# Patient Record
Sex: MALE | Race: BLACK OR AFRICAN AMERICAN | NOT HISPANIC OR LATINO | Employment: FULL TIME | URBAN - METROPOLITAN AREA
[De-identification: names, ages, dates, MRNs, and addresses within clinical notes are randomized per-mention and may not be internally consistent; named-entity substitution may affect disease eponyms.]

---

## 2017-03-08 ENCOUNTER — OFFICE VISIT (OUTPATIENT)
Dept: FAMILY MEDICINE CLINIC | Facility: CLINIC | Age: 47
End: 2017-03-08

## 2017-03-08 VITALS
SYSTOLIC BLOOD PRESSURE: 134 MMHG | HEIGHT: 74 IN | BODY MASS INDEX: 30.03 KG/M2 | RESPIRATION RATE: 16 BRPM | OXYGEN SATURATION: 98 % | HEART RATE: 90 BPM | WEIGHT: 234 LBS | TEMPERATURE: 97.6 F | DIASTOLIC BLOOD PRESSURE: 86 MMHG

## 2017-03-08 DIAGNOSIS — Z00.00 HEALTHCARE MAINTENANCE: ICD-10-CM

## 2017-03-08 DIAGNOSIS — F17.200 NICOTINE DEPENDENCE, UNCOMPLICATED, UNSPECIFIED NICOTINE PRODUCT TYPE: ICD-10-CM

## 2017-03-08 DIAGNOSIS — L30.9 ECZEMA, UNSPECIFIED TYPE: Primary | ICD-10-CM

## 2017-03-08 DIAGNOSIS — L65.9 HAIR LOSS: ICD-10-CM

## 2017-03-08 DIAGNOSIS — R20.0 NUMBNESS IN FEET: ICD-10-CM

## 2017-03-08 LAB
ALBUMIN SERPL-MCNC: 4.4 G/DL (ref 3.2–4.8)
ALBUMIN/GLOB SERPL: 1.3 G/DL (ref 1.5–2.5)
ALP SERPL-CCNC: 97 U/L (ref 25–100)
ALT SERPL W P-5'-P-CCNC: 46 U/L (ref 7–40)
ANION GAP SERPL CALCULATED.3IONS-SCNC: 5 MMOL/L (ref 3–11)
ARTICHOKE IGE QN: 236 MG/DL (ref 0–130)
AST SERPL-CCNC: 29 U/L (ref 0–33)
BASOPHILS # BLD AUTO: 0.01 10*3/MM3 (ref 0–0.2)
BASOPHILS NFR BLD AUTO: 0.1 % (ref 0–1)
BILIRUB BLD-MCNC: NEGATIVE MG/DL
BILIRUB SERPL-MCNC: 0.4 MG/DL (ref 0.3–1.2)
BUN BLD-MCNC: 17 MG/DL (ref 9–23)
BUN/CREAT SERPL: 15.5 (ref 7–25)
CALCIUM SPEC-SCNC: 10.3 MG/DL (ref 8.7–10.4)
CHLORIDE SERPL-SCNC: 109 MMOL/L (ref 99–109)
CHOLEST SERPL-MCNC: 275 MG/DL (ref 0–200)
CLARITY, POC: CLEAR
CO2 SERPL-SCNC: 29 MMOL/L (ref 20–31)
COLOR UR: YELLOW
CREAT BLD-MCNC: 1.1 MG/DL (ref 0.6–1.3)
DEPRECATED RDW RBC AUTO: 47.4 FL (ref 37–54)
EOSINOPHIL # BLD AUTO: 0.07 10*3/MM3 (ref 0.1–0.3)
EOSINOPHIL NFR BLD AUTO: 0.8 % (ref 0–3)
ERYTHROCYTE [DISTWIDTH] IN BLOOD BY AUTOMATED COUNT: 13.2 % (ref 11.3–14.5)
GFR SERPL CREATININE-BSD FRML MDRD: 88 ML/MIN/1.73
GLOBULIN UR ELPH-MCNC: 3.3 GM/DL
GLUCOSE BLD-MCNC: 92 MG/DL (ref 70–100)
GLUCOSE UR STRIP-MCNC: NEGATIVE MG/DL
HBA1C MFR BLD: 5.8 %
HCT VFR BLD AUTO: 51.3 % (ref 38.9–50.9)
HCV AB SER DONR QL: NORMAL
HDLC SERPL-MCNC: 35 MG/DL (ref 40–60)
HGB BLD-MCNC: 17 G/DL (ref 13.1–17.5)
HIV1+2 AB SER QL: NORMAL
IMM GRANULOCYTES # BLD: 0.06 10*3/MM3 (ref 0–0.03)
IMM GRANULOCYTES NFR BLD: 0.7 % (ref 0–0.6)
KETONES UR QL: NEGATIVE
LEUKOCYTE EST, POC: NEGATIVE
LYMPHOCYTES # BLD AUTO: 2.39 10*3/MM3 (ref 0.6–4.8)
LYMPHOCYTES NFR BLD AUTO: 28.2 % (ref 24–44)
MCH RBC QN AUTO: 32.3 PG (ref 27–31)
MCHC RBC AUTO-ENTMCNC: 33.1 G/DL (ref 32–36)
MCV RBC AUTO: 97.5 FL (ref 80–99)
MONOCYTES # BLD AUTO: 0.66 10*3/MM3 (ref 0–1)
MONOCYTES NFR BLD AUTO: 7.8 % (ref 0–12)
NEUTROPHILS # BLD AUTO: 5.28 10*3/MM3 (ref 1.5–8.3)
NEUTROPHILS NFR BLD AUTO: 62.4 % (ref 41–71)
NITRITE UR-MCNC: NEGATIVE MG/ML
PH UR: 5 [PH] (ref 5–8)
PLATELET # BLD AUTO: 210 10*3/MM3 (ref 150–450)
PMV BLD AUTO: 12.8 FL (ref 6–12)
POTASSIUM BLD-SCNC: 4.4 MMOL/L (ref 3.5–5.5)
PROT SERPL-MCNC: 7.7 G/DL (ref 5.7–8.2)
PROT UR STRIP-MCNC: NEGATIVE MG/DL
RBC # BLD AUTO: 5.26 10*6/MM3 (ref 4.2–5.76)
RBC # UR STRIP: NEGATIVE /UL
SODIUM BLD-SCNC: 143 MMOL/L (ref 132–146)
SP GR UR: 1.02 (ref 1–1.03)
TRIGL SERPL-MCNC: 143 MG/DL (ref 0–150)
TSH SERPL DL<=0.05 MIU/L-ACNC: 0.31 MIU/ML (ref 0.35–5.35)
UROBILINOGEN UR QL: NORMAL
WBC NRBC COR # BLD: 8.47 10*3/MM3 (ref 3.5–10.8)

## 2017-03-08 PROCEDURE — 81003 URINALYSIS AUTO W/O SCOPE: CPT | Performed by: FAMILY MEDICINE

## 2017-03-08 PROCEDURE — 84443 ASSAY THYROID STIM HORMONE: CPT | Performed by: FAMILY MEDICINE

## 2017-03-08 PROCEDURE — 86696 HERPES SIMPLEX TYPE 2 TEST: CPT | Performed by: FAMILY MEDICINE

## 2017-03-08 PROCEDURE — 85025 COMPLETE CBC W/AUTO DIFF WBC: CPT | Performed by: FAMILY MEDICINE

## 2017-03-08 PROCEDURE — 86803 HEPATITIS C AB TEST: CPT | Performed by: FAMILY MEDICINE

## 2017-03-08 PROCEDURE — 36415 COLL VENOUS BLD VENIPUNCTURE: CPT | Performed by: FAMILY MEDICINE

## 2017-03-08 PROCEDURE — G0432 EIA HIV-1/HIV-2 SCREEN: HCPCS | Performed by: FAMILY MEDICINE

## 2017-03-08 PROCEDURE — 86695 HERPES SIMPLEX TYPE 1 TEST: CPT | Performed by: FAMILY MEDICINE

## 2017-03-08 PROCEDURE — 80061 LIPID PANEL: CPT | Performed by: FAMILY MEDICINE

## 2017-03-08 PROCEDURE — 80053 COMPREHEN METABOLIC PANEL: CPT | Performed by: FAMILY MEDICINE

## 2017-03-08 PROCEDURE — 99204 OFFICE O/P NEW MOD 45 MIN: CPT | Performed by: FAMILY MEDICINE

## 2017-03-08 PROCEDURE — 99406 BEHAV CHNG SMOKING 3-10 MIN: CPT | Performed by: FAMILY MEDICINE

## 2017-03-08 PROCEDURE — 83036 HEMOGLOBIN GLYCOSYLATED A1C: CPT | Performed by: FAMILY MEDICINE

## 2017-03-08 NOTE — PROGRESS NOTES
Subjective   Tank Tejeda is a 46 y.o. male.     History of Present Illness   Establish care/ has not had a PCP    Has had numbness in right and left 5th toes. Denies back injury.  Has rash on lower extremities, itchy. Has used a steroid cream little relief.  Has not seen a dermatologist.Concerned about diabetes. + tob 1ppd  Concerned about herpes. ? previous outbreak several years ago.  Concerned about hair loss and concerned about thyroid.    The following portions of the patient's history were reviewed and updated as appropriate: allergies, current medications, past family history, past medical history, past social history, past surgical history and problem list.    Review of Systems   Constitutional: Negative for appetite change, chills, diaphoresis, fatigue, fever and unexpected weight change.   HENT: Negative for congestion, ear pain, mouth sores, postnasal drip, rhinorrhea, sinus pressure, sneezing, sore throat and trouble swallowing.    Eyes: Negative for pain, redness and visual disturbance.   Respiratory: Negative for apnea, cough, chest tightness, shortness of breath and wheezing.    Cardiovascular: Negative for chest pain, palpitations and leg swelling.   Gastrointestinal: Negative for abdominal distention, blood in stool, constipation, diarrhea and nausea.   Endocrine: Negative for cold intolerance, polydipsia, polyphagia and polyuria.   Genitourinary: Negative for difficulty urinating, dysuria, enuresis, flank pain and urgency.   Musculoskeletal: Negative for arthralgias, back pain, joint swelling, myalgias and neck pain.   Skin: Negative for color change, rash and wound.   Neurological: Negative for dizziness, syncope, weakness, light-headedness and numbness.   Hematological: Negative for adenopathy.   Psychiatric/Behavioral: Negative for agitation, behavioral problems and confusion. The patient is not nervous/anxious.        Objective   Physical Exam   Constitutional: He is oriented to person, place,  and time. He appears well-developed and well-nourished. No distress.   HENT:   Right Ear: External ear normal.   Left Ear: External ear normal.   Nose: Nose normal.   Mouth/Throat: Oropharynx is clear and moist.   Eyes: Conjunctivae and EOM are normal. Pupils are equal, round, and reactive to light.   Neck: Normal range of motion. Neck supple. No thyromegaly present.   Cardiovascular: Normal rate, regular rhythm and normal heart sounds.    No murmur heard.  Pulmonary/Chest: Effort normal and breath sounds normal. No respiratory distress. He has no wheezes.   Abdominal: Soft. Bowel sounds are normal. He exhibits no distension and no mass. There is no tenderness. There is no rebound and no guarding. No hernia.   Musculoskeletal: Normal range of motion. He exhibits no edema or tenderness.   Lymphadenopathy:     He has no cervical adenopathy.   Neurological: He is alert and oriented to person, place, and time. He has normal reflexes.   Skin: Skin is warm and dry. No rash noted. He is not diaphoretic. No erythema. No pallor.   Psychiatric: He has a normal mood and affect. His behavior is normal. Judgment and thought content normal.   Nursing note and vitals reviewed.      Assessment/Plan   Tank was seen today for establish care.    Diagnoses and all orders for this visit:    Eczema, unspecified type  -     Ambulatory Referral to Dermatology  -     Hepatitis C Antibody  -     HIV-1 / O / 2 Ag / Antibody 4th Generation    Numbness in feet  -     POC Glycosylated Hemoglobin (Hb A1C)  -     CBC & Differential  -     Comprehensive Metabolic Panel  -     Lipid Panel  -     TSH  -     HSV 1 & 2 - Specific Antibody, IgG  -     CBC Auto Differential    Hair loss    Nicotine dependence, uncomplicated, unspecified nicotine product type    Healthcare maintenance  -     POC Urinalysis Dipstick, Automated    Other orders  -     fluocinonide-emollient (LIDEX-E) 0.05 % cream; Apply  topically 2 (Two) Times a Day.      Encouraged  smoking cessation. Patient counseled on the risks, complications and implications of long term use including heart disease, stroke and increased risk of many cancers. Offered behavioral therapy, social support, and medication therapies. Patient will consider and discuss at next visit.3-10 min.  I personally spent over half of a total 45 minutes face to face with the patient in counseling and discussion and/or coordination of care as described above.

## 2017-03-09 LAB
HSV1 IGG SER IA-ACNC: <0.91 INDEX (ref 0–0.9)
HSV2 IGG SER IA-ACNC: >23.6 INDEX (ref 0–0.9)

## 2017-09-26 DIAGNOSIS — M25.562 PAIN IN BOTH KNEES, UNSPECIFIED CHRONICITY: Primary | ICD-10-CM

## 2017-09-26 DIAGNOSIS — M25.561 PAIN IN BOTH KNEES, UNSPECIFIED CHRONICITY: Primary | ICD-10-CM

## 2017-11-13 ENCOUNTER — OFFICE VISIT (OUTPATIENT)
Dept: FAMILY MEDICINE CLINIC | Facility: CLINIC | Age: 47
End: 2017-11-13

## 2017-11-13 VITALS
HEART RATE: 88 BPM | WEIGHT: 227 LBS | SYSTOLIC BLOOD PRESSURE: 128 MMHG | HEIGHT: 74 IN | DIASTOLIC BLOOD PRESSURE: 86 MMHG | BODY MASS INDEX: 29.13 KG/M2 | RESPIRATION RATE: 16 BRPM | OXYGEN SATURATION: 98 %

## 2017-11-13 DIAGNOSIS — F41.9 ANXIETY: Primary | ICD-10-CM

## 2017-11-13 PROCEDURE — 99214 OFFICE O/P EST MOD 30 MIN: CPT | Performed by: FAMILY MEDICINE

## 2017-11-13 RX ORDER — ESCITALOPRAM OXALATE 5 MG/1
TABLET ORAL
Qty: 30 TABLET | Refills: 0 | Status: SHIPPED | OUTPATIENT
Start: 2017-11-13 | End: 2017-11-30 | Stop reason: SDUPTHER

## 2017-11-13 RX ORDER — CLORAZEPATE DIPOTASSIUM 3.75 MG/1
3.75 TABLET ORAL DAILY PRN
Qty: 20 TABLET | Refills: 0 | Status: SHIPPED | OUTPATIENT
Start: 2017-11-13 | End: 2018-02-15 | Stop reason: SDUPTHER

## 2017-11-13 NOTE — PROGRESS NOTES
Subjective   Tank Tejeda is a 47 y.o. male.     Anxiety   Presents for follow-up (had to be driven home from work on the weekend due to anxiety attack; Had previously tried lexapro, celexa, paxil years ago with possilbe relief.) visit. Symptoms include nervous/anxious behavior, palpitations and panic. Patient reports no chest pain, depressed mood, insomnia, irritability, restlessness, shortness of breath or suicidal ideas. Symptoms occur rarely. The severity of symptoms is moderate. The patient sleeps 5 hours per night. The quality of sleep is good. Nighttime awakenings: occasional.     Compliance with medications is %.   Has had recent stressors, suspended license. Stressful job and now that he has license back his job.    The following portions of the patient's history were reviewed and updated as appropriate: allergies, current medications, past family history, past medical history, past social history, past surgical history and problem list.    Review of Systems   Constitutional: Negative.  Negative for irritability.   HENT: Negative.    Eyes: Negative.    Respiratory: Negative.  Negative for shortness of breath.    Cardiovascular: Positive for palpitations. Negative for chest pain.   Gastrointestinal: Negative.    Endocrine: Negative.    Genitourinary: Negative.    Musculoskeletal: Negative.    Skin: Negative.    Allergic/Immunologic: Negative.    Neurological: Negative.    Hematological: Negative.    Psychiatric/Behavioral: Negative for suicidal ideas. The patient is nervous/anxious. The patient does not have insomnia.    All other systems reviewed and are negative.      Objective   Physical Exam   Constitutional: He is oriented to person, place, and time. He appears well-developed and well-nourished. No distress.   HENT:   Right Ear: External ear normal.   Left Ear: External ear normal.   Nose: Nose normal.   Mouth/Throat: Oropharynx is clear and moist.   Eyes: Conjunctivae and EOM are normal. Pupils  are equal, round, and reactive to light.   Neck: Normal range of motion. Neck supple. No thyromegaly present.   Cardiovascular: Normal rate, regular rhythm and normal heart sounds.    No murmur heard.  Pulmonary/Chest: Effort normal and breath sounds normal. No respiratory distress. He has no wheezes.   Abdominal: Soft. Bowel sounds are normal. He exhibits no distension and no mass. There is no tenderness. There is no rebound and no guarding. No hernia.   Musculoskeletal: Normal range of motion. He exhibits no edema or tenderness.   Lymphadenopathy:     He has no cervical adenopathy.   Neurological: He is alert and oriented to person, place, and time. He has normal reflexes.   Skin: Skin is warm and dry. No rash noted. He is not diaphoretic. No erythema. No pallor.   Psychiatric: He has a normal mood and affect. His behavior is normal. Judgment and thought content normal.   Nursing note and vitals reviewed.      Assessment/Plan   Tank was seen today for anxiety and panic attack.    Diagnoses and all orders for this visit:    Anxiety    Other orders  -     clorazepate (TRANXENE-T) 3.75 MG tablet; Take 1 tablet by mouth Daily As Needed for Anxiety.  -     escitalopram (LEXAPRO) 5 MG tablet; Take 1/2 tab qd for 2 weeks then increase to 1 tab qd      Will be starting counseling through [employer] at work.  I personally spent over half of a total 25 minutes face to face with the patient in counseling and discussion and/or coordination of care as described above.

## 2017-11-30 RX ORDER — ESCITALOPRAM OXALATE 5 MG/1
TABLET ORAL
Qty: 30 TABLET | Refills: 1 | Status: SHIPPED | OUTPATIENT
Start: 2017-11-30 | End: 2018-02-26 | Stop reason: SDUPTHER

## 2018-02-15 ENCOUNTER — OFFICE VISIT (OUTPATIENT)
Dept: FAMILY MEDICINE CLINIC | Facility: CLINIC | Age: 48
End: 2018-02-15

## 2018-02-15 VITALS
HEIGHT: 74 IN | BODY MASS INDEX: 28.75 KG/M2 | WEIGHT: 224 LBS | SYSTOLIC BLOOD PRESSURE: 136 MMHG | OXYGEN SATURATION: 98 % | DIASTOLIC BLOOD PRESSURE: 82 MMHG | RESPIRATION RATE: 16 BRPM | HEART RATE: 86 BPM

## 2018-02-15 DIAGNOSIS — B00.9 HERPETIC LESION: ICD-10-CM

## 2018-02-15 DIAGNOSIS — L30.9 ECZEMA, UNSPECIFIED TYPE: ICD-10-CM

## 2018-02-15 DIAGNOSIS — F41.9 ANXIETY: Primary | ICD-10-CM

## 2018-02-15 DIAGNOSIS — R41.0 CONFUSION: ICD-10-CM

## 2018-02-15 PROCEDURE — 99214 OFFICE O/P EST MOD 30 MIN: CPT | Performed by: FAMILY MEDICINE

## 2018-02-15 RX ORDER — CLORAZEPATE DIPOTASSIUM 3.75 MG/1
3.75 TABLET ORAL DAILY PRN
Qty: 20 TABLET | Refills: 0 | Status: SHIPPED | OUTPATIENT
Start: 2018-02-15 | End: 2019-02-15

## 2018-02-15 RX ORDER — VALACYCLOVIR HYDROCHLORIDE 500 MG/1
500 TABLET, FILM COATED ORAL DAILY
Qty: 30 TABLET | Refills: 3 | Status: SHIPPED | OUTPATIENT
Start: 2018-02-15 | End: 2018-11-06 | Stop reason: SDUPTHER

## 2018-02-15 RX ORDER — METOPROLOL SUCCINATE 50 MG/1
50 TABLET, EXTENDED RELEASE ORAL DAILY
Qty: 30 TABLET | Refills: 3 | Status: SHIPPED | OUTPATIENT
Start: 2018-02-15 | End: 2019-02-12

## 2018-02-15 NOTE — PROGRESS NOTES
Subjective   Tank Tejeda is a 47 y.o. male.     Anxiety   Presents for follow-up (Has been on Lexapro and needs to stop. Thinks his anxiety is worse and has increased shaking.) visit. Symptoms include confusion, dizziness and nausea. Patient reports no chest pain, palpitations or shortness of breath. Primary symptoms comment: pt contributes symptoms to Lexapro. Symptoms occur most days. The severity of symptoms is moderate. The patient sleeps 7 hours per night. The quality of sleep is good. Nighttime awakenings: none.     Compliance with medications is %.   Neurologic Problem   The patient's primary symptoms include an altered mental status, clumsiness, a loss of balance, memory loss, slurred speech and a visual change. The patient's pertinent negatives include no focal sensory loss, focal weakness, near-syncope, syncope or weakness. Primary symptoms comment: pt contributes symptoms to Lexapro. This is a recurrent problem. The current episode started more than 1 year ago. The neurological problem developed gradually. The problem has been rapidly worsening since onset. There was no focality noted. Associated symptoms include bladder incontinence, confusion, diaphoresis, dizziness, fatigue, light-headedness and nausea. Pertinent negatives include no abdominal pain, auditory change, aura, back pain, bowel incontinence, chest pain, fever, headaches, palpitations, shortness of breath, vertigo or vomiting. Past treatments include medication and sleep. The treatment provided mild relief.   Needs rf of Lidex for eczema. Has used on legs with relief.   Would like to start Valtrex to decrease chance of herpetic outbreak.  Pt has filed for intermittant FMLA for anxiety attack. 1-2 days per occurrence up to 3-4 times per month.  The following portions of the patient's history were reviewed and updated as appropriate: allergies, current medications, past family history, past medical history, past social history, past  surgical history and problem list.    Review of Systems   Constitutional: Positive for diaphoresis and fatigue. Negative for fever.   HENT: Negative.    Eyes: Negative.    Respiratory: Negative.  Negative for shortness of breath.    Cardiovascular: Negative.  Negative for chest pain, palpitations and near-syncope.   Gastrointestinal: Positive for nausea. Negative for abdominal pain, bowel incontinence and vomiting.   Endocrine: Negative.    Genitourinary: Positive for bladder incontinence.   Musculoskeletal: Negative.  Negative for back pain.   Skin: Negative.    Allergic/Immunologic: Negative.    Neurological: Positive for dizziness, light-headedness and loss of balance. Negative for vertigo, focal weakness, syncope, weakness and headaches.   Hematological: Negative.    Psychiatric/Behavioral: Positive for confusion and memory loss.   All other systems reviewed and are negative.      Objective   Physical Exam   Constitutional: He is oriented to person, place, and time. He appears well-developed and well-nourished. No distress.   HENT:   Right Ear: External ear normal.   Left Ear: External ear normal.   Nose: Nose normal.   Mouth/Throat: Oropharynx is clear and moist.   Eyes: Conjunctivae and EOM are normal. Pupils are equal, round, and reactive to light.   Neck: Normal range of motion. Neck supple. No thyromegaly present.   Cardiovascular: Normal rate, regular rhythm and normal heart sounds.    No murmur heard.  Pulmonary/Chest: Effort normal and breath sounds normal. No respiratory distress. He has no wheezes.   Abdominal: Soft. Bowel sounds are normal. He exhibits no distension and no mass. There is no tenderness. There is no rebound and no guarding. No hernia.   Musculoskeletal: Normal range of motion. He exhibits no edema or tenderness.   Lymphadenopathy:     He has no cervical adenopathy.   Neurological: He is alert and oriented to person, place, and time. He has normal reflexes.   Skin: Skin is warm and dry.  No rash noted. He is not diaphoretic. No erythema. No pallor.   Psychiatric: He has a normal mood and affect. His behavior is normal. Judgment and thought content normal.   Nursing note and vitals reviewed.      Assessment/Plan   Tank was seen today for anxiety.    Diagnoses and all orders for this visit:    Anxiety    Eczema, unspecified type  -     Ambulatory Referral to Dermatology    Confusion  -     Ambulatory Referral to Neurology    Herpetic lesion    Other orders  -     metoprolol succinate XL (TOPROL XL) 50 MG 24 hr tablet; Take 1 tablet by mouth Daily.  -     valACYclovir (VALTREX) 500 MG tablet; Take 1 tablet by mouth Daily.  -     clorazepate (TRANXENE-T) 3.75 MG tablet; Take 1 tablet by mouth Daily As Needed for Anxiety.      OK to stop Lexapro  Pt will start counseling  Pt will start Toprol XL  I personally spent over half of a total 25 minutes face to face with the patient in counseling and discussion and/or coordination of care as described above.

## 2018-02-26 RX ORDER — ESCITALOPRAM OXALATE 5 MG/1
TABLET ORAL
Qty: 30 TABLET | Refills: 0 | Status: SHIPPED | OUTPATIENT
Start: 2018-02-26 | End: 2018-12-31

## 2018-05-15 RX ORDER — HYDROXYZINE HYDROCHLORIDE 25 MG/1
25 TABLET, FILM COATED ORAL 2 TIMES DAILY
Qty: 40 TABLET | Refills: 0 | Status: SHIPPED | OUTPATIENT
Start: 2018-05-15 | End: 2019-02-15 | Stop reason: SDUPTHER

## 2018-10-21 ENCOUNTER — APPOINTMENT (OUTPATIENT)
Dept: CT IMAGING | Facility: HOSPITAL | Age: 48
End: 2018-10-21

## 2018-10-21 ENCOUNTER — HOSPITAL ENCOUNTER (EMERGENCY)
Facility: HOSPITAL | Age: 48
Discharge: HOME | End: 2018-10-21
Attending: EMERGENCY MEDICINE | Admitting: EMERGENCY MEDICINE

## 2018-10-21 VITALS
SYSTOLIC BLOOD PRESSURE: 139 MMHG | DIASTOLIC BLOOD PRESSURE: 103 MMHG | BODY MASS INDEX: 31.14 KG/M2 | HEIGHT: 73 IN | TEMPERATURE: 98.1 F | OXYGEN SATURATION: 99 % | RESPIRATION RATE: 18 BRPM | HEART RATE: 75 BPM | WEIGHT: 235 LBS

## 2018-10-21 DIAGNOSIS — R03.0 ELEVATED BLOOD PRESSURE READING WITHOUT DIAGNOSIS OF HYPERTENSION: ICD-10-CM

## 2018-10-21 DIAGNOSIS — G51.0 BELL'S PALSY: Primary | ICD-10-CM

## 2018-10-21 LAB
ALBUMIN SERPL-MCNC: 3.98 G/DL (ref 3.2–4.8)
ALBUMIN/GLOB SERPL: 1.8 G/DL (ref 1.5–2.5)
ALP SERPL-CCNC: 84 U/L (ref 25–100)
ALT SERPL W P-5'-P-CCNC: 49 U/L (ref 7–40)
ANION GAP SERPL CALCULATED.3IONS-SCNC: 4 MMOL/L (ref 3–11)
AST SERPL-CCNC: 28 U/L (ref 0–33)
BASOPHILS # BLD AUTO: 0.02 10*3/MM3 (ref 0–0.2)
BASOPHILS NFR BLD AUTO: 0.2 % (ref 0–1)
BILIRUB SERPL-MCNC: 0.6 MG/DL (ref 0.3–1.2)
BUN BLD-MCNC: 12 MG/DL (ref 9–23)
BUN/CREAT SERPL: 10.3 (ref 7–25)
CALCIUM SPEC-SCNC: 8.8 MG/DL (ref 8.7–10.4)
CHLORIDE SERPL-SCNC: 108 MMOL/L (ref 99–109)
CO2 SERPL-SCNC: 28 MMOL/L (ref 20–31)
CREAT BLD-MCNC: 1.17 MG/DL (ref 0.6–1.3)
DEPRECATED RDW RBC AUTO: 45.8 FL (ref 37–54)
EOSINOPHIL # BLD AUTO: 0.07 10*3/MM3 (ref 0–0.3)
EOSINOPHIL NFR BLD AUTO: 0.8 % (ref 0–3)
ERYTHROCYTE [DISTWIDTH] IN BLOOD BY AUTOMATED COUNT: 13.4 % (ref 11.3–14.5)
GFR SERPL CREATININE-BSD FRML MDRD: 81 ML/MIN/1.73
GLOBULIN UR ELPH-MCNC: 2.2 GM/DL
GLUCOSE BLD-MCNC: 110 MG/DL (ref 70–100)
HCT VFR BLD AUTO: 49.7 % (ref 38.9–50.9)
HGB BLD-MCNC: 16.8 G/DL (ref 13.1–17.5)
IMM GRANULOCYTES # BLD: 0.02 10*3/MM3 (ref 0–0.03)
IMM GRANULOCYTES NFR BLD: 0.2 % (ref 0–0.6)
LYMPHOCYTES # BLD AUTO: 2.85 10*3/MM3 (ref 0.6–4.8)
LYMPHOCYTES NFR BLD AUTO: 34.5 % (ref 24–44)
MCH RBC QN AUTO: 31.8 PG (ref 27–31)
MCHC RBC AUTO-ENTMCNC: 33.8 G/DL (ref 32–36)
MCV RBC AUTO: 94.1 FL (ref 80–99)
MONOCYTES # BLD AUTO: 0.53 10*3/MM3 (ref 0–1)
MONOCYTES NFR BLD AUTO: 6.4 % (ref 0–12)
NEUTROPHILS # BLD AUTO: 4.78 10*3/MM3 (ref 1.5–8.3)
NEUTROPHILS NFR BLD AUTO: 58.1 % (ref 41–71)
PLATELET # BLD AUTO: 141 10*3/MM3 (ref 150–450)
PMV BLD AUTO: 12 FL (ref 6–12)
POTASSIUM BLD-SCNC: 4 MMOL/L (ref 3.5–5.5)
PROT SERPL-MCNC: 6.2 G/DL (ref 5.7–8.2)
RBC # BLD AUTO: 5.28 10*6/MM3 (ref 4.2–5.76)
SODIUM BLD-SCNC: 140 MMOL/L (ref 132–146)
WBC NRBC COR # BLD: 8.25 10*3/MM3 (ref 3.5–10.8)

## 2018-10-21 PROCEDURE — 80053 COMPREHEN METABOLIC PANEL: CPT | Performed by: EMERGENCY MEDICINE

## 2018-10-21 PROCEDURE — 96365 THER/PROPH/DIAG IV INF INIT: CPT

## 2018-10-21 PROCEDURE — 25010000002 DEXAMETHASONE: Performed by: EMERGENCY MEDICINE

## 2018-10-21 PROCEDURE — 70450 CT HEAD/BRAIN W/O DYE: CPT

## 2018-10-21 PROCEDURE — 99283 EMERGENCY DEPT VISIT LOW MDM: CPT

## 2018-10-21 PROCEDURE — 85025 COMPLETE CBC W/AUTO DIFF WBC: CPT | Performed by: EMERGENCY MEDICINE

## 2018-10-21 RX ORDER — ACYCLOVIR 200 MG/1
800 CAPSULE ORAL ONCE
Status: COMPLETED | OUTPATIENT
Start: 2018-10-21 | End: 2018-10-21

## 2018-10-21 RX ORDER — MINERAL OIL, PETROLATUM 425; 568 MG/G; MG/G
OINTMENT OPHTHALMIC
Qty: 7 G | Refills: 2 | Status: SHIPPED | OUTPATIENT
Start: 2018-10-21 | End: 2018-12-31

## 2018-10-21 RX ORDER — VALACYCLOVIR HYDROCHLORIDE 1 G/1
1000 TABLET, FILM COATED ORAL 2 TIMES DAILY
Qty: 20 TABLET | Refills: 0 | Status: SHIPPED | OUTPATIENT
Start: 2018-10-21 | End: 2018-11-06

## 2018-10-21 RX ORDER — PREDNISONE 10 MG/1
TABLET ORAL
Qty: 35 TABLET | Refills: 0 | Status: SHIPPED | OUTPATIENT
Start: 2018-10-21 | End: 2018-11-06

## 2018-10-21 RX ORDER — SODIUM CHLORIDE 0.9 % (FLUSH) 0.9 %
10 SYRINGE (ML) INJECTION AS NEEDED
Status: DISCONTINUED | OUTPATIENT
Start: 2018-10-21 | End: 2018-10-21 | Stop reason: HOSPADM

## 2018-10-21 RX ADMIN — DEXAMETHASONE SODIUM PHOSPHATE 10 MG: 10 INJECTION INTRAMUSCULAR; INTRAVENOUS at 09:14

## 2018-10-21 RX ADMIN — ACYCLOVIR 800 MG: 200 CAPSULE ORAL at 09:15

## 2018-10-21 NOTE — DISCHARGE INSTRUCTIONS
Follow up with one of the [facility] Primary Care Providers below to setup primary care. If you need assistance coordinating a primary care appointment with a [facility] Primary Care Provider, please contact the Primary Care Coordinators at [phone] for appointment scheduling.    [facility], Primary Care   [address] , [address]   [place], [place] [address] [phone]    [facility]  [address], [address]  [place] [address] [phone]    [facility]  [address], [address]   [place] [address] [phone]    [facility]  [address], [address]  [place], [place] [address] [phone]    [facility], Primary Care,   [address], [address]   [place], [place] [id number] [phone]    [facility]  [address]  [place], [place] [address] [phone]    [facility]  [address], [address]  [place], [place] [address] [phone]    [facility]  [address], [address]   [place] [address] [phone]    [facility], Primary Care  [address] [place] [address] [phone]      [facility]  [address], [address]   [place] [address] [phone]    [facility]  [address] [place] [address] [phone]

## 2018-10-21 NOTE — ED PROVIDER NOTES
Subjective   Mr. Tank Tejeda is a 48 y.o. male who presents to the ED with c/o right sided facial droop. He reports that when he went to sleep around 0100 this morning he was feeling normally but when he woke up at 0300 to smoke a cigarette he had mild right sided numbness. He then woke up again at 0800 to go to the restroom and noticed that he had some difficulty speaking and right sided facial droop, which prompted presentation to the ED. He notes that he is still having right sided facial numbness, difficulty blinking his right eye, and difficulty moving the right side of his mouth but he now has improved speech. He denies any generalized or focal weakness. No other acute complaints at this time.        History provided by:  Patient  Illness   Location:  R face  Quality:  Facial droop  Severity:  Moderate  Onset quality:  Gradual  Timing:  Constant  Chronicity:  New      Review of Systems   Constitutional: Negative.    Respiratory: Negative.    Cardiovascular: Negative.    Gastrointestinal: Negative.    Musculoskeletal: Negative.    Skin: Negative.    Allergic/Immunologic: Negative for immunocompromised state.   Neurological: Positive for facial asymmetry (R sided facial droop), speech difficulty and numbness (R face). Negative for weakness.   Psychiatric/Behavioral: Negative.    All other systems reviewed and are negative.      Past Medical History:   Diagnosis Date   • Allergic    • Depression    • Seasonal allergies        Allergies   Allergen Reactions   • Wellbutrin [Bupropion] Other (See Comments)     Panic attacks         Past Surgical History:   Procedure Laterality Date   • NO PAST SURGERIES         Family History   Problem Relation Age of Onset   • No Known Problems Mother    • Diabetes Father    • Birth defects Maternal Grandfather        Social History     Social History   • Marital status:      Occupational History   •       Social History Main Topics   • Smoking status:  Current Every Day Smoker   • Smokeless tobacco: Never Used   • Alcohol use No   • Drug use: No     Other Topics Concern   • Not on file         Objective   Physical Exam   Constitutional: He is oriented to person, place, and time. He appears well-developed and well-nourished. No distress.   HENT:   Head: Normocephalic and atraumatic.   Nose: Nose normal.   Eyes: Conjunctivae are normal. No scleral icterus.   Neck: Normal range of motion. Neck supple.   Cardiovascular: Normal rate, regular rhythm, normal heart sounds and intact distal pulses.    No murmur heard.  Pulmonary/Chest: Effort normal and breath sounds normal. No respiratory distress.   Musculoskeletal: Normal range of motion.   Neurological: He is alert and oriented to person, place, and time.   Patient has right sided facial palsy that includes the forehead. No other motor, sensory, or cranial nerve deficits. Arm and leg function is normal.   Skin: Skin is warm and dry. He is not diaphoretic.   Psychiatric: He has a normal mood and affect. His behavior is normal.   Nursing note and vitals reviewed.      Procedures         ED Course  ED Course as of Oct 21 1545   Sun Oct 21, 2018   0845 Patient has classic exam findings for Bell's palsy.  Right-sided facial deficit which includes the forehead.  No other neurologic deficit at this time.  Motor, sensory, and cranial nerve function is otherwise intact.  [RS]   0925 Labs are overall unremarkable.  Education provided on else.  This point, no evidence of any type of central neurologic deficit.  We will discharge her symptomatically management to follow-up with his doctor for ongoing care and return to the ER for any concerns or worsening.  Patient voices understanding and is happy with the plan.  [RS]      ED Course User Index  [RS] Pal Jones MD       Recent Results (from the past 24 hour(s))   Comprehensive Metabolic Panel    Collection Time: 10/21/18  8:53 AM   Result Value Ref Range    Glucose 110  (H) 70 - 100 mg/dL    BUN 12 9 - 23 mg/dL    Creatinine 1.17 0.60 - 1.30 mg/dL    Sodium 140 132 - 146 mmol/L    Potassium 4.0 3.5 - 5.5 mmol/L    Chloride 108 99 - 109 mmol/L    CO2 28.0 20.0 - 31.0 mmol/L    Calcium 8.8 8.7 - 10.4 mg/dL    Total Protein 6.2 5.7 - 8.2 g/dL    Albumin 3.98 3.20 - 4.80 g/dL    ALT (SGPT) 49 (H) 7 - 40 U/L    AST (SGOT) 28 0 - 33 U/L    Alkaline Phosphatase 84 25 - 100 U/L    Total Bilirubin 0.6 0.3 - 1.2 mg/dL    eGFR  African Amer 81 >60 mL/min/1.73    Globulin 2.2 gm/dL    A/G Ratio 1.8 1.5 - 2.5 g/dL    BUN/Creatinine Ratio 10.3 7.0 - 25.0    Anion Gap 4.0 3.0 - 11.0 mmol/L   CBC Auto Differential    Collection Time: 10/21/18  8:53 AM   Result Value Ref Range    WBC 8.25 3.50 - 10.80 10*3/mm3    RBC 5.28 4.20 - 5.76 10*6/mm3    Hemoglobin 16.8 13.1 - 17.5 g/dL    Hematocrit 49.7 38.9 - 50.9 %    MCV 94.1 80.0 - 99.0 fL    MCH 31.8 (H) 27.0 - 31.0 pg    MCHC 33.8 32.0 - 36.0 g/dL    RDW 13.4 11.3 - 14.5 %    RDW-SD 45.8 37.0 - 54.0 fl    MPV 12.0 6.0 - 12.0 fL    Platelets 141 (L) 150 - 450 10*3/mm3    Neutrophil % 58.1 41.0 - 71.0 %    Lymphocyte % 34.5 24.0 - 44.0 %    Monocyte % 6.4 0.0 - 12.0 %    Eosinophil % 0.8 0.0 - 3.0 %    Basophil % 0.2 0.0 - 1.0 %    Immature Grans % 0.2 0.0 - 0.6 %    Neutrophils, Absolute 4.78 1.50 - 8.30 10*3/mm3    Lymphocytes, Absolute 2.85 0.60 - 4.80 10*3/mm3    Monocytes, Absolute 0.53 0.00 - 1.00 10*3/mm3    Eosinophils, Absolute 0.07 0.00 - 0.30 10*3/mm3    Basophils, Absolute 0.02 0.00 - 0.20 10*3/mm3    Immature Grans, Absolute 0.02 0.00 - 0.03 10*3/mm3     Note: In addition to lab results from this visit, the labs listed above may include labs taken at another facility or during a different encounter within the last 24 hours. Please correlate lab times with ED admission and discharge times for further clarification of the services performed during this visit.    CT Head Without Contrast Stroke Protocol   Final Result   No evidence of  "intracranial hemorrhage.       Scan time 8:39 AM, report time 8:56 AM on 10/21/2018.       DICTATED:   10/21/2018   EDITED/ls :   10/21/2018        This report was finalized on 10/21/2018 11:26 AM by Ty Griffin.            Vitals:    10/21/18 0848   BP: (!) 139/103   BP Location: Right arm   Patient Position: Lying   Pulse: 75   Resp: 18   Temp: 98.1 °F (36.7 °C)   TempSrc: Oral   SpO2: 99%   Weight: 107 kg (235 lb)   Height: 185.4 cm (73\")     Medications   dexamethasone (DECADRON) IVPB 10 mg (0 mg Intravenous Stopped 10/21/18 0955)   acyclovir (ZOVIRAX) capsule 800 mg (800 mg Oral Given 10/21/18 0915)     ECG/EMG Results (last 24 hours)     ** No results found for the last 24 hours. **                      MDM  Number of Diagnoses or Management Options  Bell's palsy:   Elevated blood pressure reading without diagnosis of hypertension:      Amount and/or Complexity of Data Reviewed  Clinical lab tests: reviewed  Tests in the radiology section of CPT®: reviewed  Independent visualization of images, tracings, or specimens: yes        Final diagnoses:   Bell's palsy   Elevated blood pressure reading without diagnosis of hypertension       Documentation assistance provided by isaura Griffin.  Information recorded by the isaura was done at my direction and has been verified and validated by me.     Fatimah Griffin  10/21/18 0859       Fatimah Griffin  10/21/18 0926       Pal Jones MD  10/21/18 1542    "

## 2018-10-22 ENCOUNTER — OFFICE VISIT (OUTPATIENT)
Dept: FAMILY MEDICINE CLINIC | Facility: CLINIC | Age: 48
End: 2018-10-22

## 2018-10-22 VITALS
HEART RATE: 78 BPM | HEIGHT: 73 IN | DIASTOLIC BLOOD PRESSURE: 86 MMHG | OXYGEN SATURATION: 98 % | TEMPERATURE: 98.9 F | SYSTOLIC BLOOD PRESSURE: 124 MMHG | BODY MASS INDEX: 31.28 KG/M2 | RESPIRATION RATE: 16 BRPM | WEIGHT: 236 LBS

## 2018-10-22 DIAGNOSIS — G51.0 BELL'S PALSY: Primary | ICD-10-CM

## 2018-10-22 PROCEDURE — 99213 OFFICE O/P EST LOW 20 MIN: CPT | Performed by: FAMILY MEDICINE

## 2018-10-22 NOTE — PROGRESS NOTES
Subjective   Tank Tejeda is a 48 y.o. male.     History of Present Illness   Has had right facial paralysis since yesterday. Was seen at [facility] at ER yesterday and had stable labs and negative CT. Was placed on prednisone and Valtrex.   The following portions of the patient's history were reviewed and updated as appropriate: allergies, current medications, past family history, past medical history, past social history, past surgical history and problem list.    Review of Systems   Constitutional: Negative.  Negative for activity change, fatigue, fever, unexpected weight gain and unexpected weight loss.   HENT: Negative.  Negative for congestion, sneezing and sore throat.    Eyes: Negative.  Negative for blurred vision, double vision and visual disturbance.   Respiratory: Negative.  Negative for cough, chest tightness, shortness of breath and wheezing.    Cardiovascular: Negative.  Negative for chest pain, palpitations and leg swelling.   Gastrointestinal: Negative.  Negative for abdominal distention, abdominal pain, blood in stool, constipation, diarrhea and nausea.   Endocrine: Negative.  Negative for cold intolerance and heat intolerance.   Genitourinary: Negative.  Negative for urinary incontinence, dysuria, frequency and urgency.   Musculoskeletal: Negative.  Negative for arthralgias and myalgias.   Skin: Negative.  Negative for rash.   Allergic/Immunologic: Negative.    Neurological: Negative.  Negative for dizziness, syncope, numbness and memory problem.   Hematological: Negative.  Negative for adenopathy.   Psychiatric/Behavioral: Negative.  Negative for suicidal ideas and depressed mood. The patient is not nervous/anxious.    All other systems reviewed and are negative.      Objective   Physical Exam   Constitutional: He is oriented to person, place, and time. He appears well-developed and well-nourished.   HENT:   Head: Normocephalic.   Right Ear: External ear normal.   Left Ear: External ear normal.    Nose: Nose normal.   Mouth/Throat: Oropharynx is clear and moist. No oropharyngeal exudate.   Eyes: Pupils are equal, round, and reactive to light. Conjunctivae and EOM are normal.   Neck: Normal range of motion. Neck supple. No thyromegaly present.   Cardiovascular: Normal rate, regular rhythm, normal heart sounds and intact distal pulses.    No murmur heard.  Pulmonary/Chest: Effort normal and breath sounds normal. No respiratory distress. He exhibits no tenderness.   Abdominal: Soft. Bowel sounds are normal. He exhibits no distension and no mass. There is no tenderness. There is no rebound and no guarding.   Musculoskeletal: Normal range of motion.   Lymphadenopathy:     He has no cervical adenopathy.   Neurological: He is alert and oriented to person, place, and time. He has normal reflexes. He displays normal reflexes. He exhibits normal muscle tone. Coordination normal.   + Facial nerve paralysis on right   Skin: Skin is warm and dry. No rash noted. He is not diaphoretic. No erythema.   Psychiatric: He has a normal mood and affect. His behavior is normal. Judgment and thought content normal.   Nursing note and vitals reviewed.        Assessment/Plan   Tank was seen today for bells palsy.    Diagnoses and all orders for this visit:    Bell's palsy    Other orders  -     fluocinonide-emollient (LIDEX-E) 0.05 % cream; Apply  topically to the appropriate area as directed Daily.    Patient is instructed to finish Valtrex and steroid pack. He will return to clinic if symptoms worsen or persist.

## 2018-11-06 ENCOUNTER — OFFICE VISIT (OUTPATIENT)
Dept: FAMILY MEDICINE CLINIC | Facility: CLINIC | Age: 48
End: 2018-11-06

## 2018-11-06 VITALS
HEART RATE: 76 BPM | TEMPERATURE: 97.4 F | RESPIRATION RATE: 16 BRPM | WEIGHT: 235 LBS | HEIGHT: 73 IN | BODY MASS INDEX: 31.14 KG/M2 | OXYGEN SATURATION: 98 % | DIASTOLIC BLOOD PRESSURE: 78 MMHG | SYSTOLIC BLOOD PRESSURE: 126 MMHG

## 2018-11-06 DIAGNOSIS — R30.0 DYSURIA: ICD-10-CM

## 2018-11-06 DIAGNOSIS — G51.0 BELL'S PALSY: ICD-10-CM

## 2018-11-06 DIAGNOSIS — J06.9 ACUTE URI: Primary | ICD-10-CM

## 2018-11-06 LAB
ALBUMIN SERPL-MCNC: 4.2 G/DL (ref 3.2–4.8)
ALBUMIN/GLOB SERPL: 2.1 G/DL (ref 1.5–2.5)
ALP SERPL-CCNC: 92 U/L (ref 25–100)
ALT SERPL W P-5'-P-CCNC: 72 U/L (ref 7–40)
ANION GAP SERPL CALCULATED.3IONS-SCNC: 2 MMOL/L (ref 3–11)
AST SERPL-CCNC: 30 U/L (ref 0–33)
BILIRUB BLD-MCNC: NEGATIVE MG/DL
BILIRUB SERPL-MCNC: 0.6 MG/DL (ref 0.3–1.2)
BUN BLD-MCNC: 12 MG/DL (ref 9–23)
BUN/CREAT SERPL: 11.9 (ref 7–25)
CALCIUM SPEC-SCNC: 8.7 MG/DL (ref 8.7–10.4)
CHLORIDE SERPL-SCNC: 110 MMOL/L (ref 99–109)
CLARITY, POC: CLEAR
CO2 SERPL-SCNC: 27 MMOL/L (ref 20–31)
COLOR UR: YELLOW
CREAT BLD-MCNC: 1.01 MG/DL (ref 0.6–1.3)
GFR SERPL CREATININE-BSD FRML MDRD: 96 ML/MIN/1.73
GLOBULIN UR ELPH-MCNC: 2 GM/DL
GLUCOSE BLD-MCNC: 109 MG/DL (ref 70–100)
GLUCOSE UR STRIP-MCNC: NEGATIVE MG/DL
KETONES UR QL: NEGATIVE
LEUKOCYTE EST, POC: NEGATIVE
NITRITE UR-MCNC: NEGATIVE MG/ML
PH UR: 5.5 [PH] (ref 5–8)
POTASSIUM BLD-SCNC: 4.2 MMOL/L (ref 3.5–5.5)
PROT SERPL-MCNC: 6.2 G/DL (ref 5.7–8.2)
PROT UR STRIP-MCNC: NEGATIVE MG/DL
RBC # UR STRIP: NEGATIVE /UL
SODIUM BLD-SCNC: 139 MMOL/L (ref 132–146)
SP GR UR: 1.02 (ref 1–1.03)
UROBILINOGEN UR QL: NORMAL

## 2018-11-06 PROCEDURE — 81003 URINALYSIS AUTO W/O SCOPE: CPT | Performed by: FAMILY MEDICINE

## 2018-11-06 PROCEDURE — 99214 OFFICE O/P EST MOD 30 MIN: CPT | Performed by: FAMILY MEDICINE

## 2018-11-06 PROCEDURE — 87086 URINE CULTURE/COLONY COUNT: CPT | Performed by: FAMILY MEDICINE

## 2018-11-06 PROCEDURE — 36415 COLL VENOUS BLD VENIPUNCTURE: CPT | Performed by: FAMILY MEDICINE

## 2018-11-06 PROCEDURE — 80053 COMPREHEN METABOLIC PANEL: CPT | Performed by: FAMILY MEDICINE

## 2018-11-06 RX ORDER — CEFUROXIME AXETIL 250 MG/1
250 TABLET ORAL 2 TIMES DAILY
Qty: 20 TABLET | Refills: 0 | Status: SHIPPED | OUTPATIENT
Start: 2018-11-06 | End: 2018-12-31

## 2018-11-06 RX ORDER — VALACYCLOVIR HYDROCHLORIDE 500 MG/1
500 TABLET, FILM COATED ORAL DAILY
Qty: 30 TABLET | Refills: 3 | Status: SHIPPED | OUTPATIENT
Start: 2018-11-06 | End: 2019-09-28 | Stop reason: SDUPTHER

## 2018-11-06 NOTE — PROGRESS NOTES
Subjective   Tank Tejeda is a 48 y.o. male.   Has recently had Bell's Palsy. Has been on Prednisone and Valtrex without much relief. Has had increased ear pain on right.  Has had increased anxiety and had anxiety attack yesterday at work.  Needs intermittent leave for anxiety at work. Needs 1-2 days and 3-4 times per month.  URI    This is a new problem. The current episode started 1 to 4 weeks ago. The problem has been unchanged. Associated symptoms include abdominal pain, congestion, coughing, dysuria, ear pain, rhinorrhea and sinus pain. Pertinent negatives include no chest pain, diarrhea, nausea, rash, sneezing, sore throat or wheezing. Treatments tried: Valtrex and Prednisone for Bell's Palsy. The treatment provided no relief.   Back Pain   This is a new (bilat flank pain) problem. The current episode started in the past 7 days. The problem occurs intermittently. The problem is unchanged. Pain location: flank. The pain is at a severity of 4/10. The pain is mild. The symptoms are aggravated by position. Associated symptoms include abdominal pain and dysuria. Pertinent negatives include no bladder incontinence, bowel incontinence, chest pain, fever, leg pain, numbness or weight loss. Risk factors include history of steroid use and obesity. Treatments tried: cranberry juice. The treatment provided mild relief.        The following portions of the patient's history were reviewed and updated as appropriate: allergies, current medications, past family history, past medical history, past social history, past surgical history and problem list.    Review of Systems   Constitutional: Negative.  Negative for activity change, fatigue, fever, unexpected weight gain and unexpected weight loss.   HENT: Positive for congestion, ear pain and rhinorrhea. Negative for sneezing and sore throat.    Eyes: Negative.  Negative for blurred vision, double vision and visual disturbance.   Respiratory: Positive for cough. Negative for  chest tightness, shortness of breath and wheezing.    Cardiovascular: Negative.  Negative for chest pain, palpitations and leg swelling.   Gastrointestinal: Positive for abdominal pain. Negative for abdominal distention, blood in stool, bowel incontinence, constipation, diarrhea and nausea.   Endocrine: Negative.  Negative for cold intolerance and heat intolerance.   Genitourinary: Positive for dysuria. Negative for urinary incontinence, frequency and urgency.   Musculoskeletal: Positive for back pain. Negative for arthralgias and myalgias.   Skin: Negative.  Negative for rash.   Allergic/Immunologic: Negative.    Neurological: Negative.  Negative for dizziness, syncope, numbness and memory problem.   Hematological: Negative.  Negative for adenopathy.   Psychiatric/Behavioral: Negative.  Negative for suicidal ideas and depressed mood. The patient is not nervous/anxious.    All other systems reviewed and are negative.      Objective   Physical Exam   Constitutional: He is oriented to person, place, and time. He appears well-developed and well-nourished.   HENT:   Head: Normocephalic.   Right Ear: External ear normal. Tympanic membrane is erythematous. A middle ear effusion is present.   Left Ear: External ear normal. Tympanic membrane is erythematous. A middle ear effusion is present.   Nose: Nose normal.   Mouth/Throat: Oropharynx is clear and moist. No oropharyngeal exudate.   Eyes: Pupils are equal, round, and reactive to light. Conjunctivae and EOM are normal.   Neck: Normal range of motion. Neck supple. No thyromegaly present.   Cardiovascular: Normal rate, regular rhythm, normal heart sounds and intact distal pulses.    No murmur heard.  Pulmonary/Chest: Effort normal and breath sounds normal. No respiratory distress. He exhibits no tenderness.   Abdominal: Soft. Bowel sounds are normal. He exhibits no distension and no mass. There is no tenderness. There is no rebound and no guarding.   Musculoskeletal: Normal  range of motion.   Lymphadenopathy:     He has no cervical adenopathy.   Neurological: He is alert and oriented to person, place, and time. He has normal reflexes. He displays normal reflexes. He exhibits normal muscle tone. Coordination normal.   Still has Bell's palsy on right   Skin: Skin is warm and dry. No rash noted. He is not diaphoretic. No erythema.   Psychiatric: He has a normal mood and affect. His behavior is normal. Judgment and thought content normal.   Nursing note and vitals reviewed.        Assessment/Plan   Tank was seen today for earache, uri, follow-up and back pain.    Diagnoses and all orders for this visit:    Acute URI    Bell's palsy  -     Comprehensive Metabolic Panel    Dysuria  -     POC Urinalysis Dipstick, Automated  -     Urine Culture - Urine, Urine, Clean Catch    Other orders  -     cefuroxime (CEFTIN) 250 MG tablet; Take 1 tablet by mouth 2 (Two) Times a Day.  -     valACYclovir (VALTREX) 500 MG tablet; Take 1 tablet by mouth Daily.

## 2018-11-08 LAB — BACTERIA SPEC AEROBE CULT: NORMAL

## 2019-01-19 ENCOUNTER — HOSPITAL ENCOUNTER (EMERGENCY)
Facility: HOSPITAL | Age: 49
Discharge: HOME | End: 2019-01-19
Attending: EMERGENCY MEDICINE | Admitting: EMERGENCY MEDICINE

## 2019-01-19 VITALS
OXYGEN SATURATION: 98 % | HEART RATE: 93 BPM | RESPIRATION RATE: 16 BRPM | BODY MASS INDEX: 31.14 KG/M2 | TEMPERATURE: 99.1 F | WEIGHT: 235 LBS | SYSTOLIC BLOOD PRESSURE: 127 MMHG | DIASTOLIC BLOOD PRESSURE: 99 MMHG | HEIGHT: 73 IN

## 2019-01-19 DIAGNOSIS — H60.92 OTITIS EXTERNA OF LEFT EAR, UNSPECIFIED CHRONICITY, UNSPECIFIED TYPE: Primary | ICD-10-CM

## 2019-01-19 DIAGNOSIS — H61.22 IMPACTED CERUMEN, LEFT EAR: ICD-10-CM

## 2019-01-19 PROCEDURE — 99283 EMERGENCY DEPT VISIT LOW MDM: CPT

## 2019-01-19 PROCEDURE — 99282 EMERGENCY DEPT VISIT SF MDM: CPT

## 2019-01-19 RX ORDER — OFLOXACIN 3 MG/ML
10 SOLUTION AURICULAR (OTIC) DAILY
Qty: 4 ML | Refills: 0 | Status: SHIPPED | OUTPATIENT
Start: 2019-01-19 | End: 2019-01-26

## 2019-01-19 NOTE — ED PROVIDER NOTES
"Subjective   Mr. Tank Tejeda is a 48 y.o. male who presents to the ED with c/o left ear pain. He reports for 2.5 weeks he has been experiencing fluid in his ear, burning, itchiness, intermittent shooting pain that is 8/10 in severity, fever, pain to palpation of ear, rhinorrhea, wateriness of the eyes, and balance obstruction. He denies chills, nausea, vomiting, diarrhea, dizziness, room spinning, and \"tinny\" sound in ear. He states he has seasonal allergies but his current symptoms are different. He visited a [facility] 2.5 weeks ago and was given amoxicillin. He does not have an ENT doctor. He has a history of bells palsy. He is a smoker who is trying to stop. 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 There are no other acute complaints at this time.        History provided by:  Patient  Earache   Location:  Left  Quality:  Sore and shooting  Severity:  Severe  Onset quality:  Sudden  Duration:  2 weeks  Timing:  Intermittent  Progression:  Unchanged  Chronicity:  New  Relieved by:  Nothing  Ineffective treatments: amoxicillin.  Associated symptoms: fever and rhinorrhea    Associated symptoms: no diarrhea and no vomiting        Review of Systems   Constitutional: Positive for fever. Negative for chills.   HENT: Positive for ear pain and rhinorrhea.    Gastrointestinal: Negative for diarrhea, nausea and vomiting.   Neurological: Negative for dizziness.   All other systems reviewed and are negative.      Past Medical History:   Diagnosis Date   • Allergic    • Bell's palsy    • Depression    • Seasonal allergies        Allergies   Allergen Reactions   • Wellbutrin [Bupropion] Other (See Comments)     Panic attacks         Past Surgical History:   Procedure Laterality Date   • NO PAST SURGERIES         Family History   Problem Relation Age of Onset   • No Known Problems Mother    • Diabetes Father    • Birth defects Maternal Grandfather        Social History     Socioeconomic History   • Marital " status:      Spouse name: Not on file   • Number of children: Not on file   • Years of education: Not on file   • Highest education level: Not on file   Occupational History   • Occupation:    Tobacco Use   • Smoking status: Former Smoker     Last attempt to quit: 2018     Years since quittin.2   • Smokeless tobacco: Never Used   Substance and Sexual Activity   • Alcohol use: No   • Drug use: No   • Sexual activity: Defer         Objective   Physical Exam   Constitutional: He is oriented to person, place, and time. He appears well-developed and well-nourished.   HENT:   Head: Normocephalic and atraumatic.   Nose: Nose normal.   Distal cerumen in left ear with inflamed canal with edema and mild erythema. No mastoid tenderness or periauricular or bony tenderness.   Eyes: Conjunctivae are normal. No scleral icterus. Right eye exhibits nystagmus. Left eye exhibits nystagmus.   Mild nystagmus.     Neck: Normal range of motion. Neck supple.   Cardiovascular: Normal rate, regular rhythm and normal heart sounds.   No murmur heard.  Pulmonary/Chest: Effort normal and breath sounds normal. No respiratory distress.   Abdominal: Soft. There is no tenderness.   Musculoskeletal: Normal range of motion.   Neurological: He is alert and oriented to person, place, and time.   Facial asymmetry associated with previously stated bells palsy.   Skin: Skin is warm and dry.   Psychiatric: He has a normal mood and affect. His behavior is normal.   Nursing note and vitals reviewed.      Procedures         ED Course       No results found for this or any previous visit (from the past 24 hour(s)).  Note: In addition to lab results from this visit, the labs listed above may include labs taken at another facility or during a different encounter within the last 24 hours. Please correlate lab times with ED admission and discharge times for further clarification of the services performed during this visit.    No  "orders to display     Vitals:    01/19/19 1800 01/19/19 1901   BP: (!) 137/113 127/99   Pulse: 110 93   Resp: 18 16   Temp: 99.1 °F (37.3 °C)    SpO2: 100% 98%   Weight: 107 kg (235 lb)    Height: 185.4 cm (73\")      Medications - No data to display  ECG/EMG Results (last 24 hours)     ** No results found for the last 24 hours. **        No orders to display                     MDM    Final diagnoses:   Otitis externa of left ear, unspecified chronicity, unspecified type   Impacted cerumen, left ear       Documentation assistance provided by isaura Wharton.  Information recorded by the scribe was done at my direction and has been verified and validated by me.     Geraldine Wharton  01/19/19 1923       Josh Haywood MD  01/19/19 2112    "

## 2019-01-19 NOTE — DISCHARGE INSTRUCTIONS
Follow up with ENT Dr. Hill. Call on Monday to schedule an appointment.    Off work for 2 days.    If you still have symptoms that put you at risk in any way do not work until symptom resolution or follow-up.    Use topical antibiotics and debrox as discussed.  It's important that you clear the wax from your left ear with wax removal medicine as discussed.  Use the Floxin daily but only after you use the debrox    Return to the ER if you experience any new or worsening symptoms.    Please review the medications you are supposed to be taking according to prior physician recommendations. I have not changed your home medications during this visit. If your discharge instructions indicate that I have changed your home medications, this is not the case, and you should disregard. If you have any questions about the medication you should be taking at home, please call your physician.

## 2019-02-05 ENCOUNTER — APPOINTMENT (OUTPATIENT)
Dept: CT IMAGING | Facility: HOSPITAL | Age: 49
End: 2019-02-05

## 2019-02-05 ENCOUNTER — APPOINTMENT (OUTPATIENT)
Dept: GENERAL RADIOLOGY | Facility: HOSPITAL | Age: 49
End: 2019-02-05

## 2019-02-05 ENCOUNTER — HOSPITAL ENCOUNTER (EMERGENCY)
Facility: HOSPITAL | Age: 49
Discharge: HOME | End: 2019-02-05
Attending: EMERGENCY MEDICINE | Admitting: EMERGENCY MEDICINE

## 2019-02-05 VITALS
HEIGHT: 74 IN | BODY MASS INDEX: 29.52 KG/M2 | RESPIRATION RATE: 18 BRPM | SYSTOLIC BLOOD PRESSURE: 114 MMHG | TEMPERATURE: 98.7 F | WEIGHT: 230 LBS | HEART RATE: 61 BPM | OXYGEN SATURATION: 100 % | DIASTOLIC BLOOD PRESSURE: 85 MMHG

## 2019-02-05 DIAGNOSIS — R06.02 SHORTNESS OF BREATH: Primary | ICD-10-CM

## 2019-02-05 DIAGNOSIS — R47.9 SPEECH DISTURBANCE, UNSPECIFIED TYPE: ICD-10-CM

## 2019-02-05 LAB
ALBUMIN SERPL-MCNC: 3.95 G/DL (ref 3.2–4.8)
ALBUMIN/GLOB SERPL: 2 G/DL (ref 1.5–2.5)
ALP SERPL-CCNC: 78 U/L (ref 25–100)
ALT SERPL W P-5'-P-CCNC: 29 U/L (ref 7–40)
ANION GAP SERPL CALCULATED.3IONS-SCNC: 4 MMOL/L (ref 3–11)
AST SERPL-CCNC: 23 U/L (ref 0–33)
BASOPHILS # BLD AUTO: 0.02 10*3/MM3 (ref 0–0.2)
BASOPHILS NFR BLD AUTO: 0.3 % (ref 0–1)
BILIRUB SERPL-MCNC: 0.8 MG/DL (ref 0.3–1.2)
BNP SERPL-MCNC: 43 PG/ML (ref 0–100)
BUN BLD-MCNC: 13 MG/DL (ref 9–23)
BUN/CREAT SERPL: 12.5 (ref 7–25)
CALCIUM SPEC-SCNC: 8.9 MG/DL (ref 8.7–10.4)
CHLORIDE SERPL-SCNC: 110 MMOL/L (ref 99–109)
CO2 SERPL-SCNC: 24 MMOL/L (ref 20–31)
CREAT BLD-MCNC: 1.04 MG/DL (ref 0.6–1.3)
DEPRECATED RDW RBC AUTO: 46.4 FL (ref 37–54)
EOSINOPHIL # BLD AUTO: 0.08 10*3/MM3 (ref 0–0.3)
EOSINOPHIL NFR BLD AUTO: 1.2 % (ref 0–3)
ERYTHROCYTE [DISTWIDTH] IN BLOOD BY AUTOMATED COUNT: 13.4 % (ref 11.3–14.5)
GFR SERPL CREATININE-BSD FRML MDRD: 93 ML/MIN/1.73
GLOBULIN UR ELPH-MCNC: 2 GM/DL
GLUCOSE BLD-MCNC: 116 MG/DL (ref 70–100)
HCT VFR BLD AUTO: 47.6 % (ref 38.9–50.9)
HGB BLD-MCNC: 15.7 G/DL (ref 13.1–17.5)
HOLD SPECIMEN: NORMAL
HOLD SPECIMEN: NORMAL
IMM GRANULOCYTES # BLD AUTO: 0.02 10*3/MM3 (ref 0–0.03)
IMM GRANULOCYTES NFR BLD AUTO: 0.3 % (ref 0–0.6)
LYMPHOCYTES # BLD AUTO: 2.22 10*3/MM3 (ref 0.6–4.8)
LYMPHOCYTES NFR BLD AUTO: 33.2 % (ref 24–44)
MAGNESIUM SERPL-MCNC: 1.9 MG/DL (ref 1.3–2.7)
MCH RBC QN AUTO: 31.4 PG (ref 27–31)
MCHC RBC AUTO-ENTMCNC: 33 G/DL (ref 32–36)
MCV RBC AUTO: 95.2 FL (ref 80–99)
MONOCYTES # BLD AUTO: 0.48 10*3/MM3 (ref 0–1)
MONOCYTES NFR BLD AUTO: 7.2 % (ref 0–12)
NEUTROPHILS # BLD AUTO: 3.88 10*3/MM3 (ref 1.5–8.3)
NEUTROPHILS NFR BLD AUTO: 58.1 % (ref 41–71)
PLATELET # BLD AUTO: 161 10*3/MM3 (ref 150–450)
PMV BLD AUTO: 12.6 FL (ref 6–12)
POTASSIUM BLD-SCNC: 3.8 MMOL/L (ref 3.5–5.5)
PROT SERPL-MCNC: 5.9 G/DL (ref 5.7–8.2)
RBC # BLD AUTO: 5 10*6/MM3 (ref 4.2–5.76)
SODIUM BLD-SCNC: 138 MMOL/L (ref 132–146)
T4 FREE SERPL-MCNC: 0.96 NG/DL (ref 0.89–1.76)
TROPONIN I SERPL-MCNC: 0 NG/ML (ref 0–0.07)
TROPONIN I SERPL-MCNC: 0 NG/ML (ref 0–0.07)
TSH SERPL DL<=0.05 MIU/L-ACNC: 1.08 MIU/ML (ref 0.35–5.35)
WBC NRBC COR # BLD: 6.68 10*3/MM3 (ref 3.5–10.8)
WHOLE BLOOD HOLD SPECIMEN: NORMAL
WHOLE BLOOD HOLD SPECIMEN: NORMAL

## 2019-02-05 PROCEDURE — 85025 COMPLETE CBC W/AUTO DIFF WBC: CPT | Performed by: EMERGENCY MEDICINE

## 2019-02-05 PROCEDURE — 83735 ASSAY OF MAGNESIUM: CPT | Performed by: EMERGENCY MEDICINE

## 2019-02-05 PROCEDURE — 83880 ASSAY OF NATRIURETIC PEPTIDE: CPT | Performed by: EMERGENCY MEDICINE

## 2019-02-05 PROCEDURE — 84439 ASSAY OF FREE THYROXINE: CPT | Performed by: EMERGENCY MEDICINE

## 2019-02-05 PROCEDURE — 80053 COMPREHEN METABOLIC PANEL: CPT | Performed by: EMERGENCY MEDICINE

## 2019-02-05 PROCEDURE — 71045 X-RAY EXAM CHEST 1 VIEW: CPT

## 2019-02-05 PROCEDURE — 93005 ELECTROCARDIOGRAM TRACING: CPT | Performed by: EMERGENCY MEDICINE

## 2019-02-05 PROCEDURE — 99285 EMERGENCY DEPT VISIT HI MDM: CPT

## 2019-02-05 PROCEDURE — 70450 CT HEAD/BRAIN W/O DYE: CPT

## 2019-02-05 PROCEDURE — 84484 ASSAY OF TROPONIN QUANT: CPT

## 2019-02-05 PROCEDURE — 84443 ASSAY THYROID STIM HORMONE: CPT | Performed by: EMERGENCY MEDICINE

## 2019-02-05 RX ORDER — SODIUM CHLORIDE 0.9 % (FLUSH) 0.9 %
10 SYRINGE (ML) INJECTION AS NEEDED
Status: DISCONTINUED | OUTPATIENT
Start: 2019-02-05 | End: 2019-02-05 | Stop reason: HOSPADM

## 2019-02-05 NOTE — ED PROVIDER NOTES
Subjective   Tank Tejeda is a 48 y.o.male who presents to the emergency department with complaints of shortness of breath and anxiety. Two days ago he had an episode where he suddenly felt his heart racing, became extremely confused, and had what he describes as expressive aphasia. The patient tells us that he has a history of anxiety and can usually tell when he is having a panic attack although this presentation felt somewhat different and was much worse than usual. The episode was witnessed by family who report that the patient seemed very lethargic and had difficulty carrying on a normal conversation. It seemed like he wanted to say something but didn't know how to and when he finally did verbalize it took him a very long time. He reports feeling back to his baseline yesterday but when he woke up this morning he was more anxious and short of breath than normal. He denies nausea, vomiting, diarrhea, or recent cough but has felt like he has had fluid in his ears (left worse than right). He says that his ears are very painful and that the fluid makes him feel claustrophobic. His right sided facial weakness is chronic and unchanged from when he was diagnosed with Bell's palsy three months ago. There are no other acute complaints at this time.        History provided by:  Patient and relative  Anxiety   Presents for initial visit. Onset was in the past 7 days. The problem has been gradually worsening. Symptoms include confusion, nervous/anxious behavior, palpitations and shortness of breath. Patient reports no nausea.     His past medical history is significant for anxiety/panic attacks. Past treatments include benzodiazephines. The treatment provided no relief. Compliance with prior treatments has been good.       Review of Systems   HENT: Positive for ear pain.    Respiratory: Positive for shortness of breath. Negative for cough.    Cardiovascular: Positive for palpitations.   Gastrointestinal: Negative for  diarrhea, nausea and vomiting.   Neurological: Positive for facial asymmetry and speech difficulty.        Right sided facial paralysis (known diagnosis of Bell's palsy)   Psychiatric/Behavioral: Positive for confusion. The patient is nervous/anxious.    All other systems reviewed and are negative.      Past Medical History:   Diagnosis Date   • Allergic    • Bell's palsy    • Depression    • Seasonal allergies        Allergies   Allergen Reactions   • Wellbutrin [Bupropion] Other (See Comments)     Panic attacks         Past Surgical History:   Procedure Laterality Date   • NO PAST SURGERIES         Family History   Problem Relation Age of Onset   • No Known Problems Mother    • Diabetes Father    • Birth defects Maternal Grandfather        Social History     Socioeconomic History   • Marital status:      Spouse name: Not on file   • Number of children: Not on file   • Years of education: Not on file   • Highest education level: Not on file   Occupational History   • Occupation:    Tobacco Use   • Smoking status: Former Smoker     Last attempt to quit: 2018     Years since quittin.2   • Smokeless tobacco: Never Used   Substance and Sexual Activity   • Alcohol use: No   • Drug use: No   • Sexual activity: Defer         Objective   Physical Exam   Constitutional: He is oriented to person, place, and time. He appears well-developed and well-nourished. No distress.   HENT:   Head: Normocephalic and atraumatic.   Right Ear: Tympanic membrane normal.   Left ear canal is occluded. Left tympanic membrane is obscured by wax.   Eyes: Conjunctivae are normal. No scleral icterus.   Neck: Normal range of motion. Neck supple.   Cardiovascular: Normal rate, regular rhythm and normal heart sounds.   No murmur heard.  Pulmonary/Chest: Effort normal and breath sounds normal. No respiratory distress.   Abdominal: Soft. There is no tenderness.   Musculoskeletal: He exhibits no edema.   Neurological: He  is alert and oriented to person, place, and time. He has normal strength. A cranial nerve deficit is present. No sensory deficit.   Right facial paralysis.  Speech fluent and articulate.  5/5 x4.   Skin: Skin is warm and dry. No erythema.   Psychiatric: He has a normal mood and affect. His behavior is normal.   Nursing note and vitals reviewed.      Procedures         ED Course     CT head negative. Labs benign.  EKG NSR.  CXR negative. Two negative cardiac sets.  No symptoms in the ED.  Patient stable on serial rechecks.  Discussed findings, concerns, plan of care, expected course, reasons to return and followup.                  MDM  Number of Diagnoses or Management Options  Shortness of breath:   Speech disturbance, unspecified type:      Amount and/or Complexity of Data Reviewed  Clinical lab tests: reviewed and ordered  Tests in the radiology section of CPT®: reviewed and ordered  Independent visualization of images, tracings, or specimens: yes        Final diagnoses:   Shortness of breath   Speech disturbance, unspecified type       Documentation assistance provided by isaura Cramer.  Information recorded by the scribe was done at my direction and has been verified and validated by me.     Jolynn Cramer  02/05/19 1231       Jolynn Cramer  02/05/19 1515       Amador Paniagua MD  02/05/19 2029

## 2019-02-11 PROCEDURE — 99284 EMERGENCY DEPT VISIT MOD MDM: CPT

## 2019-02-12 ENCOUNTER — HOSPITAL ENCOUNTER (EMERGENCY)
Facility: HOSPITAL | Age: 49
Discharge: HOME | End: 2019-02-12
Attending: EMERGENCY MEDICINE | Admitting: EMERGENCY MEDICINE

## 2019-02-12 ENCOUNTER — OFFICE VISIT (OUTPATIENT)
Dept: NEUROLOGY | Facility: CLINIC | Age: 49
End: 2019-02-12

## 2019-02-12 VITALS
OXYGEN SATURATION: 98 % | HEART RATE: 88 BPM | DIASTOLIC BLOOD PRESSURE: 82 MMHG | HEIGHT: 73 IN | BODY MASS INDEX: 29.82 KG/M2 | WEIGHT: 225 LBS | SYSTOLIC BLOOD PRESSURE: 148 MMHG

## 2019-02-12 VITALS
SYSTOLIC BLOOD PRESSURE: 151 MMHG | WEIGHT: 225 LBS | TEMPERATURE: 98.1 F | DIASTOLIC BLOOD PRESSURE: 84 MMHG | BODY MASS INDEX: 29.82 KG/M2 | RESPIRATION RATE: 18 BRPM | HEIGHT: 73 IN | HEART RATE: 64 BPM | OXYGEN SATURATION: 100 %

## 2019-02-12 DIAGNOSIS — F41.0 PANIC ANXIETY SYNDROME: Primary | ICD-10-CM

## 2019-02-12 DIAGNOSIS — G51.0 BELL'S PALSY: Primary | ICD-10-CM

## 2019-02-12 DIAGNOSIS — R42 DIZZINESS: ICD-10-CM

## 2019-02-12 LAB
ALBUMIN SERPL-MCNC: 4.53 G/DL (ref 3.2–4.8)
ALBUMIN/GLOB SERPL: 1.7 G/DL (ref 1.5–2.5)
ALP SERPL-CCNC: 86 U/L (ref 25–100)
ALT SERPL W P-5'-P-CCNC: 40 U/L (ref 7–40)
ANION GAP SERPL CALCULATED.3IONS-SCNC: 9 MMOL/L (ref 3–11)
AST SERPL-CCNC: 33 U/L (ref 0–33)
BASOPHILS # BLD AUTO: 0.01 10*3/MM3 (ref 0–0.2)
BASOPHILS NFR BLD AUTO: 0.1 % (ref 0–1)
BILIRUB SERPL-MCNC: 1.3 MG/DL (ref 0.3–1.2)
BUN BLD-MCNC: 15 MG/DL (ref 9–23)
BUN/CREAT SERPL: 14.3 (ref 7–25)
CALCIUM SPEC-SCNC: 9.3 MG/DL (ref 8.7–10.4)
CHLORIDE SERPL-SCNC: 107 MMOL/L (ref 99–109)
CO2 SERPL-SCNC: 22 MMOL/L (ref 20–31)
CREAT BLD-MCNC: 1.05 MG/DL (ref 0.6–1.3)
DEPRECATED RDW RBC AUTO: 45.3 FL (ref 37–54)
EOSINOPHIL # BLD AUTO: 0.04 10*3/MM3 (ref 0–0.3)
EOSINOPHIL NFR BLD AUTO: 0.4 % (ref 0–3)
ERYTHROCYTE [DISTWIDTH] IN BLOOD BY AUTOMATED COUNT: 13.3 % (ref 11.3–14.5)
GFR SERPL CREATININE-BSD FRML MDRD: 92 ML/MIN/1.73
GLOBULIN UR ELPH-MCNC: 2.7 GM/DL
GLUCOSE BLD-MCNC: 84 MG/DL (ref 70–100)
HCT VFR BLD AUTO: 50 % (ref 38.9–50.9)
HGB BLD-MCNC: 17.1 G/DL (ref 13.1–17.5)
IMM GRANULOCYTES # BLD AUTO: 0.03 10*3/MM3 (ref 0–0.05)
IMM GRANULOCYTES NFR BLD AUTO: 0.3 % (ref 0–0.6)
LYMPHOCYTES # BLD AUTO: 2.6 10*3/MM3 (ref 0.6–4.8)
LYMPHOCYTES NFR BLD AUTO: 25.6 % (ref 24–44)
MCH RBC QN AUTO: 31.9 PG (ref 27–31)
MCHC RBC AUTO-ENTMCNC: 34.2 G/DL (ref 32–36)
MCV RBC AUTO: 93.3 FL (ref 80–99)
MONOCYTES # BLD AUTO: 0.68 10*3/MM3 (ref 0–1)
MONOCYTES NFR BLD AUTO: 6.7 % (ref 0–12)
NEUTROPHILS # BLD AUTO: 6.83 10*3/MM3 (ref 1.5–8.3)
NEUTROPHILS NFR BLD AUTO: 67.2 % (ref 41–71)
PLATELET # BLD AUTO: 188 10*3/MM3 (ref 150–450)
PMV BLD AUTO: 12.1 FL (ref 6–12)
POTASSIUM BLD-SCNC: 3.8 MMOL/L (ref 3.5–5.5)
PROT SERPL-MCNC: 7.2 G/DL (ref 5.7–8.2)
RBC # BLD AUTO: 5.36 10*6/MM3 (ref 4.2–5.76)
SODIUM BLD-SCNC: 138 MMOL/L (ref 132–146)
WBC NRBC COR # BLD: 10.16 10*3/MM3 (ref 3.5–10.8)

## 2019-02-12 PROCEDURE — 80053 COMPREHEN METABOLIC PANEL: CPT | Performed by: NURSE PRACTITIONER

## 2019-02-12 PROCEDURE — 96360 HYDRATION IV INFUSION INIT: CPT

## 2019-02-12 PROCEDURE — 99205 OFFICE O/P NEW HI 60 MIN: CPT | Performed by: PSYCHIATRY & NEUROLOGY

## 2019-02-12 PROCEDURE — 85025 COMPLETE CBC W/AUTO DIFF WBC: CPT | Performed by: NURSE PRACTITIONER

## 2019-02-12 RX ORDER — MECLIZINE HCL 12.5 MG/1
TABLET ORAL
Qty: 60 TABLET | Refills: 1 | Status: SHIPPED | OUTPATIENT
Start: 2019-02-12 | End: 2019-03-05

## 2019-02-12 RX ORDER — SODIUM CHLORIDE 0.9 % (FLUSH) 0.9 %
10 SYRINGE (ML) INJECTION AS NEEDED
Status: DISCONTINUED | OUTPATIENT
Start: 2019-02-12 | End: 2019-02-12 | Stop reason: HOSPADM

## 2019-02-12 RX ADMIN — SODIUM CHLORIDE 1000 ML: 9 INJECTION, SOLUTION INTRAVENOUS at 00:46

## 2019-02-12 NOTE — DISCHARGE INSTRUCTIONS
REconsider your former avoidance of using Vistaril that you have at home.  Keep your anticipated appointment at your primary care provider. Call the office of neurologist, Dr. HURST.  THANK YOU.

## 2019-02-12 NOTE — PROGRESS NOTES
Subjective:    CC: Tank Tejeda is seen today in consultation at the request of Nadira Abraham DO for Tremors       HPI:  48-year-old -American male with a history of anxiety, depression, Bell's palsy now presents with tremors and muscle spasms.  As per patient he had complete right facial Alan spells he in October of last year.  He was treated with Valtrex and steroids  after the onset of symptoms however he continues to have residual right facial weakness.  He denies a history of rash, viral illnesses or tick bite.  Since the event he has been having worsening anxiety and complains of a multitude of symptoms including dizziness and lightheadedness that occurs with changes in position or if he sees movement like looking at the TV screen or while seeing windshield wipers while driving..  He also gets tremors in his hands and occasionally his left  leg with spasms in his leg.  Denies any weakness or numbness.   He has been to the ER multiple times with the symptoms including last night when he was having the spasms in his legs and was prescribed Atarax for anxiety.  He has also had 2 CT scans of the head that have been normal.   Of note-I personally reviewed his ER notes, CT scans of the head and his PCP Dr. Abraham note.  She had started him on Lexapro however that worsened his anxiety hence she stopped it and switched him to Toprol-XL instead the patient did not start Toprol either due to fear of side effects.    The following portions of the patient's history were reviewed today and updated as of 02/12/2019  : allergies, current medications, past family history, past medical history, past social history, past surgical history and problem list  These document will be scanned to patient's chart.      Current Outpatient Medications:   •  carbamide peroxide (DEBROX) 6.5 % otic solution, Administer 10 drops into the left ear 2 (Two) Times a Day. Until resolution, Disp: 15 mL, Rfl: 0  •  clorazepate (TRANXENE-T)  3.75 MG tablet, Take 1 tablet by mouth Daily As Needed for Anxiety., Disp: 20 tablet, Rfl: 0  •  hydrOXYzine (ATARAX) 25 MG tablet, Take 1 tablet by mouth 2 (Two) Times a Day., Disp: 40 tablet, Rfl: 0  •  valACYclovir (VALTREX) 500 MG tablet, Take 1 tablet by mouth Daily., Disp: 30 tablet, Rfl: 3  •  meclizine (ANTIVERT) 12.5 MG tablet, 1-2 tablets as needed for dizziness or one hour prior to travel, Disp: 60 tablet, Rfl: 1  No current facility-administered medications for this visit.    Past Medical History:   Diagnosis Date   • Allergic    • Bell's palsy    • Depression    • Seasonal allergies       Past Surgical History:   Procedure Laterality Date   • NO PAST SURGERIES        Family History   Problem Relation Age of Onset   • No Known Problems Mother    • Diabetes Father    • Birth defects Maternal Grandfather       Social History     Socioeconomic History   • Marital status:      Spouse name: Not on file   • Number of children: Not on file   • Years of education: Not on file   • Highest education level: Not on file   Social Needs   • Financial resource strain: Not on file   • Food insecurity - worry: Not on file   • Food insecurity - inability: Not on file   • Transportation needs - medical: Not on file   • Transportation needs - non-medical: Not on file   Occupational History   • Occupation:    Tobacco Use   • Smoking status: Former Smoker     Last attempt to quit: 2018     Years since quittin.2   • Smokeless tobacco: Never Used   Substance and Sexual Activity   • Alcohol use: No   • Drug use: No   • Sexual activity: Defer   Other Topics Concern   • Not on file   Social History Narrative   • Not on file     Review of Systems   Constitutional: Positive for activity change, appetite change, chills and fatigue.   Respiratory: Positive for chest tightness.    Cardiovascular: Positive for palpitations.   Gastrointestinal: Positive for nausea.   Skin: Positive for rash.  "  Neurological: Positive for tremors, speech difficulty, weakness, light-headedness, numbness and confusion.   Psychiatric/Behavioral: Positive for agitation, behavioral problems, decreased concentration and sleep disturbance. The patient is nervous/anxious.    All other systems reviewed and are negative.      Objective:    /82 (BP Location: Right arm, Patient Position: Sitting, Cuff Size: Adult)   Pulse 88   Ht 185.4 cm (73\")   Wt 102 kg (225 lb)   SpO2 98%   BMI 29.69 kg/m²     Neurology Exam:    General apperance: NAD.  No tremors or fasciculations noted on exam today    Mental status: Alert, awake and oriented to time place and person.    Recent and Remote memory: Intact.    Attention span and Concentration: Normal.     Language and Speech: Intact- No dysarthria.    Fluency, Naming , Repitition and Comprehension:  Intact    Cranial Nerves:   CN II: Visual fields are full. Intact. Fundi - Normal, No papillederma, Pupils - ROSA  CN III, IV and VI: Extraocular movements are intact. Normal saccades.   CN V: Facial sensation is intact.   CN VII: Muscles of facial expression reveal weakness on the right consistent with complete Bell's palsy  CN VIII: Hearing is intact. Whispered voice intact.   CN IX and X: Palate elevates symmetrically. Intact  CN XI: Shoulder shrug is intact.   CN XII: Tongue is midline without evidence of atrophy or fasciculation.     Ophthalmoscopic exam of optic disc-normal    Motor:  Right UE muscle strength 5/5. Normal tone.     Left UE muscle strength 5/5. Normal tone.      Right LE muscle strength5/5. Normal tone.     Left LE muscle strength 5/5. Normal tone.      Sensory: Normal light touch, vibration and pinprick sensation bilaterally.    DTRs: 2+ bilaterally in upper and 3 +lower extremities.    Babinski: Negative bilaterally.    Co-ordination: Normal finger-to-nose, heel to shin B/L.    Rhomberg: Negative.    Gait: Normal.    Cardiovascular: Regular rate and rhythm without " murmur, gallop or rub.    Assessment and Plan:  1. Bell's palsy  Patient had complete Bell's palsy in October followed by several symptoms which could be due to underlying anxiety.  But he also has hyperreflexia on examination today.  Denies any neck or low back pain.  I will get an MRI brain to rule out any intracranial pathology.  - MRI Brain With & Without Contrast; Future    2. Dizziness  He has persistent perceptual postural dizziness (PPPD) which could also be due to anxiety.  I will give him meclizine as needed.  I have counseled him to start exercising regularly       Return in about 4 weeks (around 3/12/2019).       Claudette Pineda MD

## 2019-02-12 NOTE — ED PROVIDER NOTES
"Subjective   Mr. Tank Tejeda is a 48 year old male who returns to the ED with c/o \"spasms.\" Patient is a poor historian and the chronology of his symptoms is difficult to assess. He tells me that over the past week or so he has experienced several episodes of diffuse \"spasms,\" generalized shaking that seem to move through his bilateral body. He tells me these episodes last up to 45 minutes and are accompanied by increased sensitivity to both sound and movement. He can \"hear his neighbors upstairs\" and see the \"movement on his television.\" He's been to the emergency room twice since the onset of his symptoms (once here, please see note by Dr. Paniagua on 2/5, and once to 's ED on 2/8). He states that both times workup has been inconclusive and he's been sent home without a clear diagnosis. He returns tonight after another such episode of aforementioned \"spasms.\" He reports he \"wants answers.\" Admits to a history of anxiety but feels these episodes are different. He denies any associated chest pain, shortness of breath, or heart palpitations. No fevers or chills. No other acute symptoms at this time. \"I think I'm having a nervous breakdown\".  \"I have panic attacks all the time.\"           History provided by:  Patient  Illness   Location:  Generalized  Quality:  \"spasms\"  Severity:  Moderate  Onset quality:  Sudden  Duration:  1 week  Timing:  Intermittent  Progression:  Unchanged  Chronicity:  New  Context:  See above  Relieved by:  Nothing  Worsened by:  Nothing  Associated symptoms: nausea and vomiting    Associated symptoms: no abdominal pain, no chest pain, no diarrhea, no fever, no loss of consciousness, no rhinorrhea and no shortness of breath        Review of Systems   Constitutional: Negative for fever.   HENT: Negative for rhinorrhea.    Respiratory: Negative for shortness of breath.    Cardiovascular: Negative for chest pain.   Gastrointestinal: Positive for nausea and vomiting. Negative for abdominal " "pain and diarrhea.   Neurological: Negative for loss of consciousness.        Generalized \"spasms\", chronic right facial droop secondary to Bell's Palsy   Psychiatric/Behavioral: Positive for agitation. Negative for behavioral problems, confusion and hallucinations. The patient is nervous/anxious.    All other systems reviewed and are negative.      Past Medical History:   Diagnosis Date   • Allergic    • Bell's palsy    • Depression    • Seasonal allergies        Allergies   Allergen Reactions   • Trazodone Anaphylaxis   • Wellbutrin [Bupropion] Other (See Comments)     Panic attacks         Past Surgical History:   Procedure Laterality Date   • NO PAST SURGERIES         Family History   Problem Relation Age of Onset   • No Known Problems Mother    • Diabetes Father    • Birth defects Maternal Grandfather        Social History     Socioeconomic History   • Marital status:      Spouse name: Not on file   • Number of children: Not on file   • Years of education: Not on file   • Highest education level: Not on file   Occupational History   • Occupation:    Tobacco Use   • Smoking status: Former Smoker     Last attempt to quit: 2018     Years since quittin.2   • Smokeless tobacco: Never Used   Substance and Sexual Activity   • Alcohol use: No   • Drug use: No   • Sexual activity: Defer         Objective   Physical Exam   Constitutional: He is oriented to person, place, and time. He appears well-developed and well-nourished. No distress.   HENT:   Head: Normocephalic and atraumatic.   Eyes: Conjunctivae are normal. No scleral icterus.   Neck: Normal range of motion. Neck supple.   Cardiovascular: Normal rate, regular rhythm and normal heart sounds.   No murmur heard.  Pulmonary/Chest: Effort normal and breath sounds normal. No respiratory distress. He has no wheezes. He has no rales.   Abdominal: Soft. Bowel sounds are normal. There is no tenderness. There is no guarding. "   Musculoskeletal: Normal range of motion.   Absolutely no spasms witnessed during my exam.   Neurological: He is alert and oriented to person, place, and time.   Right facial droop consistent with known Bell's Palsy.   Skin: Skin is warm and dry. He is not diaphoretic.   Psychiatric: His behavior is normal.   Flat.  Anxious. Compulsive detailed accounts.    Nursing note and vitals reviewed.      Procedures         ED Course       Recent Results (from the past 24 hour(s))   Comprehensive Metabolic Panel    Collection Time: 02/12/19 12:45 AM   Result Value Ref Range    Glucose 84 70 - 100 mg/dL    BUN 15 9 - 23 mg/dL    Creatinine 1.05 0.60 - 1.30 mg/dL    Sodium 138 132 - 146 mmol/L    Potassium 3.8 3.5 - 5.5 mmol/L    Chloride 107 99 - 109 mmol/L    CO2 22.0 20.0 - 31.0 mmol/L    Calcium 9.3 8.7 - 10.4 mg/dL    Total Protein 7.2 5.7 - 8.2 g/dL    Albumin 4.53 3.20 - 4.80 g/dL    ALT (SGPT) 40 7 - 40 U/L    AST (SGOT) 33 0 - 33 U/L    Alkaline Phosphatase 86 25 - 100 U/L    Total Bilirubin 1.3 (H) 0.3 - 1.2 mg/dL    eGFR  African Amer 92 >60 mL/min/1.73    Globulin 2.7 gm/dL    A/G Ratio 1.7 1.5 - 2.5 g/dL    BUN/Creatinine Ratio 14.3 7.0 - 25.0    Anion Gap 9.0 3.0 - 11.0 mmol/L   CBC Auto Differential    Collection Time: 02/12/19 12:45 AM   Result Value Ref Range    WBC 10.16 3.50 - 10.80 10*3/mm3    RBC 5.36 4.20 - 5.76 10*6/mm3    Hemoglobin 17.1 13.1 - 17.5 g/dL    Hematocrit 50.0 38.9 - 50.9 %    MCV 93.3 80.0 - 99.0 fL    MCH 31.9 (H) 27.0 - 31.0 pg    MCHC 34.2 32.0 - 36.0 g/dL    RDW 13.3 11.3 - 14.5 %    RDW-SD 45.3 37.0 - 54.0 fl    MPV 12.1 (H) 6.0 - 12.0 fL    Platelets 188 150 - 450 10*3/mm3    Neutrophil % 67.2 41.0 - 71.0 %    Lymphocyte % 25.6 24.0 - 44.0 %    Monocyte % 6.7 0.0 - 12.0 %    Eosinophil % 0.4 0.0 - 3.0 %    Basophil % 0.1 0.0 - 1.0 %    Immature Grans % 0.3 0.0 - 0.6 %    Neutrophils, Absolute 6.83 1.50 - 8.30 10*3/mm3    Lymphocytes, Absolute 2.60 0.60 - 4.80 10*3/mm3    Monocytes,  Absolute 0.68 0.00 - 1.00 10*3/mm3    Eosinophils, Absolute 0.04 0.00 - 0.30 10*3/mm3    Basophils, Absolute 0.01 0.00 - 0.20 10*3/mm3    Immature Grans, Absolute 0.03 0.00 - 0.05 10*3/mm3     Note: In addition to lab results from this visit, the labs listed above may include labs taken at another facility or during a different encounter within the last 24 hours. Please correlate lab times with ED admission and discharge times for further clarification of the services performed during this visit.    No orders to display     Vitals:    02/12/19 0023 02/12/19 0024 02/12/19 0100 02/12/19 0130   BP: 126/97  122/85 114/81   Pulse:   65 56   Resp:       Temp:       TempSrc:       SpO2:  100% 99% 98%   Weight:       Height:         Medications   sodium chloride 0.9 % flush 10 mL (not administered)   sodium chloride 0.9 % bolus 1,000 mL (1,000 mL Intravenous New Bag 2/12/19 0046)     ECG/EMG Results (last 24 hours)     ** No results found for the last 24 hours. **        No orders to display                     MDM    Final diagnoses:   Panic anxiety syndrome       Documentation assistance provided by isaura Morris.  Information recorded by the isaura was done at my direction and has been verified and validated by me.     Romain Morris  02/12/19 0108       Laurie Gagnon APRN  02/12/19 0218       Laurie Gagnon APRN  02/12/19 0218

## 2019-02-15 ENCOUNTER — OFFICE VISIT (OUTPATIENT)
Dept: FAMILY MEDICINE CLINIC | Facility: CLINIC | Age: 49
End: 2019-02-15

## 2019-02-15 VITALS
WEIGHT: 226.4 LBS | BODY MASS INDEX: 30 KG/M2 | SYSTOLIC BLOOD PRESSURE: 142 MMHG | HEART RATE: 102 BPM | RESPIRATION RATE: 18 BRPM | DIASTOLIC BLOOD PRESSURE: 84 MMHG | TEMPERATURE: 97.8 F | OXYGEN SATURATION: 98 % | HEIGHT: 73 IN

## 2019-02-15 DIAGNOSIS — I10 ESSENTIAL HYPERTENSION: ICD-10-CM

## 2019-02-15 DIAGNOSIS — F41.9 ANXIETY: Primary | ICD-10-CM

## 2019-02-15 PROCEDURE — 99214 OFFICE O/P EST MOD 30 MIN: CPT | Performed by: FAMILY MEDICINE

## 2019-02-15 RX ORDER — HYDROXYZINE HYDROCHLORIDE 25 MG/1
25 TABLET, FILM COATED ORAL 2 TIMES DAILY
Qty: 40 TABLET | Refills: 0 | Status: SHIPPED | OUTPATIENT
Start: 2019-02-15 | End: 2019-03-18 | Stop reason: SINTOL

## 2019-02-15 RX ORDER — METOPROLOL SUCCINATE 50 MG/1
50 TABLET, EXTENDED RELEASE ORAL DAILY
Qty: 30 TABLET | Refills: 3 | Status: SHIPPED | OUTPATIENT
Start: 2019-02-15 | End: 2019-03-18

## 2019-02-15 NOTE — PROGRESS NOTES
Subjective   Tank Tejeda is a 48 y.o. male.     History of Present Illness   Has been to ER 3 times in the past few weeks due to panic anxiety and tremors. Has had multiple work up and negative cardiac workup. Will be following with neuro for tremors and will be having an MRI. Pt is unable to drive due to the anxiety. Is improving some because he found out all of his medical tests are improving. Pt is concerned that he needs psychiatric treatment at this time. Has been taking vistaril some relief. Pt thinks he may had had a bad reaction to tranxene and has not been taking. Has missed a lot of work and needs form completed to continue work. Was started on Lexapro and DNT and worsened anxiety. Has a previous rx of metoprolol but has not filled it.  Has had Bell's palsy a couple months ago which has improved some.   The following portions of the patient's history were reviewed and updated as appropriate: allergies, current medications, past family history, past medical history, past social history, past surgical history and problem list.    Review of Systems   Constitutional: Negative.  Negative for activity change, fatigue, fever, unexpected weight gain and unexpected weight loss.   HENT: Negative.  Negative for congestion, sneezing and sore throat.    Eyes: Negative.  Negative for blurred vision, double vision and visual disturbance.   Respiratory: Negative.  Negative for cough, chest tightness, shortness of breath and wheezing.    Cardiovascular: Negative.  Negative for chest pain, palpitations and leg swelling.   Gastrointestinal: Negative.  Negative for abdominal distention, abdominal pain, blood in stool, constipation, diarrhea and nausea.   Endocrine: Negative.  Negative for cold intolerance and heat intolerance.   Genitourinary: Negative.  Negative for urinary incontinence, dysuria, frequency and urgency.   Musculoskeletal: Negative.  Negative for arthralgias and myalgias.   Skin: Negative.  Negative for rash.    Allergic/Immunologic: Negative.    Neurological: Negative.  Negative for dizziness, syncope, numbness and memory problem.   Hematological: Negative.  Negative for adenopathy.   Psychiatric/Behavioral: Negative.  Negative for suicidal ideas and depressed mood. The patient is not nervous/anxious.    All other systems reviewed and are negative.      Objective   Physical Exam   Constitutional: He is oriented to person, place, and time. He appears well-developed and well-nourished.   HENT:   Head: Normocephalic.   Right Ear: External ear normal.   Left Ear: External ear normal.   Nose: Nose normal.   Mouth/Throat: Oropharynx is clear and moist. No oropharyngeal exudate.   Eyes: Conjunctivae and EOM are normal. Pupils are equal, round, and reactive to light.   Neck: Normal range of motion. Neck supple. No thyromegaly present.   Cardiovascular: Normal rate, regular rhythm, normal heart sounds and intact distal pulses.   No murmur heard.  Pulmonary/Chest: Effort normal and breath sounds normal. No respiratory distress. He exhibits no tenderness.   Abdominal: Soft. Bowel sounds are normal. He exhibits no distension and no mass. There is no tenderness. There is no rebound and no guarding.   Musculoskeletal: Normal range of motion.   Lymphadenopathy:     He has no cervical adenopathy.   Neurological: He is alert and oriented to person, place, and time. He has normal reflexes. He displays normal reflexes. He exhibits normal muscle tone. Coordination normal.   Skin: Skin is warm and dry. No rash noted. He is not diaphoretic. No erythema.   Psychiatric: He has a normal mood and affect. His behavior is normal. Judgment and thought content normal.   Nursing note and vitals reviewed.        Assessment/Plan   Tank was seen today for panic attack.    Diagnoses and all orders for this visit:    Anxiety    Essential hypertension    Other orders  -     metoprolol succinate XL (TOPROL XL) 50 MG 24 hr tablet; Take 1 tablet by mouth  Daily.  -     hydrOXYzine (ATARAX) 25 MG tablet; Take 1 tablet by mouth 2 (Two) Times a Day.      Pt will be referred to psych. He also will go to [facility] he thinks his condition is worsening. He will be seeing Neuro and having MRI Brain.  He will f/u in clinic in 1 week.  Rec. being off work (starting 2/3/19 due to ER visits)  and RTW 3/4/19.

## 2019-02-19 ENCOUNTER — APPOINTMENT (OUTPATIENT)
Dept: MRI IMAGING | Facility: HOSPITAL | Age: 49
End: 2019-02-19

## 2019-03-05 ENCOUNTER — OFFICE VISIT (OUTPATIENT)
Dept: FAMILY MEDICINE CLINIC | Facility: CLINIC | Age: 49
End: 2019-03-05

## 2019-03-05 VITALS
BODY MASS INDEX: 30.09 KG/M2 | HEIGHT: 73 IN | DIASTOLIC BLOOD PRESSURE: 98 MMHG | TEMPERATURE: 99.5 F | OXYGEN SATURATION: 99 % | HEART RATE: 70 BPM | RESPIRATION RATE: 18 BRPM | SYSTOLIC BLOOD PRESSURE: 126 MMHG | WEIGHT: 227 LBS

## 2019-03-05 DIAGNOSIS — I10 ESSENTIAL HYPERTENSION: Primary | ICD-10-CM

## 2019-03-05 DIAGNOSIS — F41.9 ANXIETY: ICD-10-CM

## 2019-03-05 PROCEDURE — 99214 OFFICE O/P EST MOD 30 MIN: CPT | Performed by: FAMILY MEDICINE

## 2019-03-05 RX ORDER — PROPRANOLOL HYDROCHLORIDE 10 MG/1
TABLET ORAL
COMMUNITY
Start: 2019-03-04 | End: 2019-03-05

## 2019-03-05 RX ORDER — CLORAZEPATE DIPOTASSIUM 3.75 MG/1
3.75 TABLET ORAL DAILY PRN
Qty: 30 TABLET | Refills: 0 | Status: SHIPPED | OUTPATIENT
Start: 2019-03-05 | End: 2019-04-18

## 2019-03-05 RX ORDER — SERTRALINE HYDROCHLORIDE 25 MG/1
TABLET, FILM COATED ORAL
Qty: 30 TABLET | Refills: 0 | Status: SHIPPED | OUTPATIENT
Start: 2019-03-05 | End: 2019-03-18 | Stop reason: SDUPTHER

## 2019-03-05 NOTE — PROGRESS NOTES
Subjective   Tank Tejeda is a 48 y.o. male.   Has recently been to [facility] inpatient for generalized anxiety. Still does not feel completely better and has continues panic attacks. Has appointments with [facility] on March 14 and 20. Pt still unable to drive due to anxiety. Pt would like to trying antidepressant/antianxiety.  Pt does not feel like he can work at this time.  Anxiety   Presents for follow-up visit. Symptoms include excessive worry, irritability, nervous/anxious behavior, palpitations and panic. Patient reports no chest pain, depressed mood, dizziness, insomnia, malaise, nausea, restlessness, shortness of breath or suicidal ideas. Symptoms occur occasionally. The severity of symptoms is causing significant distress. The patient sleeps 6 hours per night. The quality of sleep is good. Nighttime awakenings: occasional.     Compliance with medications is %.   Hypertension   This is a recurrent problem. The current episode started more than 1 month ago. The problem has been gradually improving since onset. Associated symptoms include anxiety and palpitations. Pertinent negatives include no blurred vision, chest pain or shortness of breath. There are no associated agents to hypertension. Risk factors for coronary artery disease include obesity and male gender. Past treatments include beta blockers. Current antihypertension treatment includes beta blockers. The current treatment provides moderate improvement. There are no compliance problems.         The following portions of the patient's history were reviewed and updated as appropriate: allergies, current medications, past family history, past medical history, past social history, past surgical history and problem list.    Review of Systems   Constitutional: Positive for irritability. Negative for activity change, fatigue, fever, unexpected weight gain and unexpected weight loss.   HENT: Negative.  Negative for congestion, sneezing and sore  throat.    Eyes: Negative.  Negative for blurred vision, double vision and visual disturbance.   Respiratory: Negative.  Negative for cough, chest tightness, shortness of breath and wheezing.    Cardiovascular: Positive for palpitations. Negative for chest pain and leg swelling.   Gastrointestinal: Negative.  Negative for abdominal distention, abdominal pain, blood in stool, constipation, diarrhea and nausea.   Endocrine: Negative.  Negative for cold intolerance and heat intolerance.   Genitourinary: Negative.  Negative for urinary incontinence, dysuria, frequency and urgency.   Musculoskeletal: Negative.  Negative for arthralgias and myalgias.   Skin: Negative.  Negative for rash.   Allergic/Immunologic: Negative.    Neurological: Negative.  Negative for dizziness, syncope, numbness and memory problem.   Hematological: Negative.  Negative for adenopathy.   Psychiatric/Behavioral: Negative for suicidal ideas and depressed mood. The patient is nervous/anxious. The patient does not have insomnia.    All other systems reviewed and are negative.      Objective   Physical Exam   Constitutional: He is oriented to person, place, and time. He appears well-developed and well-nourished.   HENT:   Head: Normocephalic.   Right Ear: External ear normal.   Left Ear: External ear normal.   Nose: Nose normal.   Mouth/Throat: Oropharynx is clear and moist. No oropharyngeal exudate.   Eyes: Conjunctivae and EOM are normal. Pupils are equal, round, and reactive to light.   Neck: Normal range of motion. Neck supple. No thyromegaly present.   Cardiovascular: Normal rate, regular rhythm, normal heart sounds and intact distal pulses.   No murmur heard.  Pulmonary/Chest: Effort normal and breath sounds normal. No respiratory distress. He exhibits no tenderness.   Abdominal: Soft. Bowel sounds are normal. He exhibits no distension and no mass. There is no tenderness. There is no rebound and no guarding.   Musculoskeletal: Normal range of  motion.   Lymphadenopathy:     He has no cervical adenopathy.   Neurological: He is alert and oriented to person, place, and time. He has normal reflexes. He displays normal reflexes. He exhibits normal muscle tone. Coordination normal.   Skin: Skin is warm and dry. No rash noted. He is not diaphoretic. No erythema.   Psychiatric: He has a normal mood and affect. His behavior is normal. Judgment and thought content normal.   Nursing note and vitals reviewed.        Assessment/Plan   Tank was seen today for anxiety and hypertension.    Diagnoses and all orders for this visit:    Essential hypertension    Anxiety  -     sertraline (ZOLOFT) 25 MG tablet; Take 1/2 tab qd for 2 weeks then increase to 1 tab qd.  -     clorazepate (TRANXENE) 3.75 MG tablet; Take 1 tablet by mouth Daily As Needed for Anxiety.        Will keep off work March 15  until  seen by [facility] and RTW March 18.  Will start Zoloft 25 mg 12/ tab and then increase to 1 tab qd.      Addendum: Discussed with patient that he will be off through March 18 on which day he will return to see me.

## 2019-03-18 ENCOUNTER — TELEPHONE (OUTPATIENT)
Dept: NEUROLOGY | Facility: CLINIC | Age: 49
End: 2019-03-18

## 2019-03-18 ENCOUNTER — OFFICE VISIT (OUTPATIENT)
Dept: FAMILY MEDICINE CLINIC | Facility: CLINIC | Age: 49
End: 2019-03-18

## 2019-03-18 VITALS
HEART RATE: 85 BPM | DIASTOLIC BLOOD PRESSURE: 90 MMHG | SYSTOLIC BLOOD PRESSURE: 120 MMHG | RESPIRATION RATE: 18 BRPM | TEMPERATURE: 99 F | OXYGEN SATURATION: 99 % | WEIGHT: 224 LBS | BODY MASS INDEX: 29.69 KG/M2 | HEIGHT: 73 IN

## 2019-03-18 DIAGNOSIS — F41.9 ANXIETY: Primary | ICD-10-CM

## 2019-03-18 DIAGNOSIS — I10 ESSENTIAL HYPERTENSION: ICD-10-CM

## 2019-03-18 DIAGNOSIS — R42 VERTIGO: ICD-10-CM

## 2019-03-18 DIAGNOSIS — H61.22 IMPACTED CERUMEN OF LEFT EAR: ICD-10-CM

## 2019-03-18 PROCEDURE — 69209 REMOVE IMPACTED EAR WAX UNI: CPT | Performed by: FAMILY MEDICINE

## 2019-03-18 PROCEDURE — 99214 OFFICE O/P EST MOD 30 MIN: CPT | Performed by: FAMILY MEDICINE

## 2019-03-18 RX ORDER — HYDROXYZINE 50 MG/1
TABLET, FILM COATED ORAL
COMMUNITY
Start: 2019-03-06 | End: 2019-03-18 | Stop reason: SINTOL

## 2019-03-18 RX ORDER — SERTRALINE HYDROCHLORIDE 25 MG/1
TABLET, FILM COATED ORAL
Qty: 30 TABLET | Refills: 1 | Status: SHIPPED | OUTPATIENT
Start: 2019-03-18 | End: 2019-04-04 | Stop reason: SINTOL

## 2019-03-18 RX ORDER — MECLIZINE HYDROCHLORIDE 25 MG/1
25 TABLET ORAL 3 TIMES DAILY PRN
Qty: 30 TABLET | Refills: 0 | Status: SHIPPED | OUTPATIENT
Start: 2019-03-18 | End: 2019-03-18

## 2019-03-18 NOTE — TELEPHONE ENCOUNTER
He can reschedule his appointment to whenever he wants based on availability.  I do not think that the cyst is responsible for his vertigo however

## 2019-03-18 NOTE — PROGRESS NOTES
Subjective   Tank Tejeda is a 48 y.o. male.   Has not seen psychologist yet.  Continues to have dizziness and lack of balance which he in turn thinks has caused some anxiety. Has had lightheaded, off balance for a few months. Feels like depth perception is off. Has been using Debrox for ear wax.   Has seen Neuro and has had an MRI and will be following up with Neuro.  Pt starting to itch all over and needs a rf of lidex cream.  Anxiety   Presents for follow-up (Doing much better on Zoloft 12.5 qd and has not needed to increase) visit. Patient reports no chest pain, depressed mood, dizziness, excessive worry, insomnia, irritability, malaise, nausea, nervous/anxious behavior, palpitations, panic, restlessness, shortness of breath or suicidal ideas. Symptoms occur occasionally. The severity of symptoms is causing significant distress. The patient sleeps 6 hours per night. The quality of sleep is good. Nighttime awakenings: occasional.     Compliance with medications is %.   Hypertension   This is a recurrent (DNT metoprolol and not taking anything for BP.) problem. The current episode started more than 1 month ago. The problem has been gradually improving since onset. Associated symptoms include anxiety. Pertinent negatives include no blurred vision, chest pain, palpitations or shortness of breath. There are no associated agents to hypertension. Risk factors for coronary artery disease include obesity and male gender. Past treatments include beta blockers. Current antihypertension treatment includes beta blockers. The current treatment provides moderate improvement. There are no compliance problems.         The following portions of the patient's history were reviewed and updated as appropriate: allergies, current medications, past family history, past medical history, past social history, past surgical history and problem list.  Vitals:    03/18/19 1249   BP: 120/90   Pulse: 85   Resp: 18   Temp: 99 °F (37.2  °C)   SpO2: 99%     Body mass index is 29.55 kg/m².  Review of Systems   Constitutional: Negative.  Negative for activity change, fatigue, fever, irritability, unexpected weight gain and unexpected weight loss.   HENT: Negative.  Negative for congestion, sneezing and sore throat.    Eyes: Negative.  Negative for blurred vision, double vision and visual disturbance.   Respiratory: Negative.  Negative for cough, chest tightness, shortness of breath and wheezing.    Cardiovascular: Negative.  Negative for chest pain, palpitations and leg swelling.   Gastrointestinal: Negative.  Negative for abdominal distention, abdominal pain, blood in stool, constipation, diarrhea and nausea.   Endocrine: Negative.  Negative for cold intolerance and heat intolerance.   Genitourinary: Negative.  Negative for urinary incontinence, dysuria, frequency and urgency.   Musculoskeletal: Negative.  Negative for arthralgias and myalgias.   Skin: Negative.  Negative for rash.   Allergic/Immunologic: Negative.    Neurological: Negative.  Negative for dizziness, syncope, numbness and memory problem.   Hematological: Negative.  Negative for adenopathy.   Psychiatric/Behavioral: Negative.  Negative for suicidal ideas and depressed mood. The patient is not nervous/anxious and does not have insomnia.    All other systems reviewed and are negative.      Objective   Physical Exam   Constitutional: He is oriented to person, place, and time. He appears well-developed and well-nourished.   HENT:   Head: Normocephalic.   Right Ear: External ear normal.   Left Ear: An impacted cerumen is present.  Nose: Nose normal.   Mouth/Throat: Oropharynx is clear and moist. No oropharyngeal exudate.   Eyes: Conjunctivae and EOM are normal. Pupils are equal, round, and reactive to light.   Neck: Normal range of motion. Neck supple. No thyromegaly present.   Cardiovascular: Normal rate, regular rhythm, normal heart sounds and intact distal pulses.   No murmur  heard.  Pulmonary/Chest: Effort normal and breath sounds normal. No respiratory distress. He exhibits no tenderness.   Abdominal: Soft. Bowel sounds are normal. He exhibits no distension and no mass. There is no tenderness. There is no rebound and no guarding.   Musculoskeletal: Normal range of motion.   Lymphadenopathy:     He has no cervical adenopathy.   Neurological: He is alert and oriented to person, place, and time. He has normal reflexes. He displays normal reflexes. He exhibits normal muscle tone. Coordination normal.   Skin: Skin is warm and dry. No rash noted. He is not diaphoretic. No erythema.   Psychiatric: He has a normal mood and affect. His behavior is normal. Judgment and thought content normal.   Nursing note and vitals reviewed.      Ear Cerumen Removal Instrumentation  Date/Time: 3/18/2019 1:18 PM  Performed by: Nadira Abraham DO  Authorized by: Nadira Abraham DO   Consent: Verbal consent obtained. Written consent not obtained.  Risks and benefits: risks, benefits and alternatives were discussed  Consent given by: patient  Patient understanding: patient states understanding of the procedure being performed  Patient identity confirmed: verbally with patient    Anesthesia:  Local Anesthetic: none  Location details: left ear  Patient tolerance: Patient tolerated the procedure well with no immediate complications  Comments: Unable to irrigate cerumen in left ear  Procedure type: irrigation   Sedation:  Patient sedated: no              Assessment/Plan   Tank was seen today for anxiety and hypertension.    Diagnoses and all orders for this visit:    Anxiety  -     sertraline (ZOLOFT) 25 MG tablet; Take 1/2 tab qd for 2 weeks then increase to 1 tab qd.    Essential hypertension    Vertigo  -     Discontinue: meclizine (ANTIVERT) 25 MG tablet; Take 1 tablet by mouth 3 (Three) Times a Day As Needed for dizziness.  -     Ambulatory Referral to ENT (Otolaryngology)    Impacted cerumen of left ear  -      Ear Cerumen Removal Instrumentation  -     Ambulatory Referral to ENT (Otolaryngology)    Other orders  -     fluocinonide-emollient (LIDEX-E) 0.05 % cream; Apply  topically to the appropriate area as directed Daily.      Pt will be off work until seen by ENT and will RTW 3/25/18.

## 2019-03-18 NOTE — TELEPHONE ENCOUNTER
Patient called and stated that he has been looking at symptoms online and that he understands he has cyst and he is having vertigo and that he wanted to see if there was any appointments before his follow up on 4/18. Advised patient that we had no appointments at this time but he wanted to see if there was anytime you could see him before appointment

## 2019-03-18 NOTE — TELEPHONE ENCOUNTER
----- Message from Claudette Pineda MD sent at 3/18/2019 10:57 AM EDT -----  Can you let him know that his MRI brain did not show any strokes.  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  No further tests need to be done.  A follow-up MRI can be done in 1 year to make sure that the cyst is not increasing in size

## 2019-03-19 NOTE — TELEPHONE ENCOUNTER
Spoke to patient and advised him that vertigo is not due to Cyst and that if he has additional problems with vertigo to call our office or PCP office. Patient stated that he understood and had no questions

## 2019-03-22 ENCOUNTER — OFFICE VISIT (OUTPATIENT)
Dept: FAMILY MEDICINE CLINIC | Facility: CLINIC | Age: 49
End: 2019-03-22

## 2019-03-22 VITALS
DIASTOLIC BLOOD PRESSURE: 94 MMHG | BODY MASS INDEX: 29.34 KG/M2 | HEIGHT: 73 IN | TEMPERATURE: 97.1 F | WEIGHT: 221.38 LBS | RESPIRATION RATE: 18 BRPM | HEART RATE: 89 BPM | SYSTOLIC BLOOD PRESSURE: 126 MMHG | OXYGEN SATURATION: 99 %

## 2019-03-22 DIAGNOSIS — F41.9 ANXIETY: Primary | ICD-10-CM

## 2019-03-22 PROCEDURE — 99213 OFFICE O/P EST LOW 20 MIN: CPT | Performed by: FAMILY MEDICINE

## 2019-03-22 NOTE — PROGRESS NOTES
Subjective   Tank Tejeda is a 49 y.o. male.     History of Present Illness   Has seen ENT today and had wax and cototn removed today with relief. Was diagnosed with ETD.    Anxiety more stable and able to function. Pt. would like to RTC on Monday.March 25. Doing well on Zoloft. Has been doing cognitive behavior therapy.  Pt still has shaking in hands and concerned about Parkinson's due to tremors.    The following portions of the patient's history were reviewed and updated as appropriate: allergies, current medications, past family history, past medical history, past social history, past surgical history and problem list.    Review of Systems   Constitutional: Negative.  Negative for activity change, fatigue, fever, unexpected weight gain and unexpected weight loss.   HENT: Negative.  Negative for congestion, sneezing and sore throat.    Eyes: Negative.  Negative for blurred vision, double vision and visual disturbance.   Respiratory: Negative.  Negative for cough, chest tightness, shortness of breath and wheezing.    Cardiovascular: Negative.  Negative for chest pain, palpitations and leg swelling.   Gastrointestinal: Negative.  Negative for abdominal distention, abdominal pain, blood in stool, constipation, diarrhea and nausea.   Endocrine: Negative.  Negative for cold intolerance and heat intolerance.   Genitourinary: Negative.  Negative for urinary incontinence, dysuria, frequency and urgency.   Musculoskeletal: Negative.  Negative for arthralgias and myalgias.   Skin: Negative.  Negative for rash.   Allergic/Immunologic: Negative.    Neurological: Negative.  Negative for dizziness, syncope, numbness and memory problem.   Hematological: Negative.  Negative for adenopathy.   Psychiatric/Behavioral: Negative.  Negative for suicidal ideas and depressed mood. The patient is not nervous/anxious.    All other systems reviewed and are negative.      Objective   Physical Exam   Constitutional: He is oriented to person,  place, and time. He appears well-developed and well-nourished.   HENT:   Head: Normocephalic.   Right Ear: External ear normal.   Left Ear: External ear normal.   Nose: Nose normal.   Mouth/Throat: Oropharynx is clear and moist. No oropharyngeal exudate.   Eyes: Conjunctivae and EOM are normal. Pupils are equal, round, and reactive to light.   Neck: Normal range of motion. Neck supple. No thyromegaly present.   Cardiovascular: Normal rate, regular rhythm, normal heart sounds and intact distal pulses.   No murmur heard.  Pulmonary/Chest: Effort normal and breath sounds normal. No respiratory distress. He exhibits no tenderness.   Abdominal: Soft. Bowel sounds are normal. He exhibits no distension and no mass. There is no tenderness. There is no rebound and no guarding.   Musculoskeletal: Normal range of motion.   Lymphadenopathy:     He has no cervical adenopathy.   Neurological: He is alert and oriented to person, place, and time. He has normal reflexes. He displays normal reflexes. He exhibits normal muscle tone. Coordination normal.   Skin: Skin is warm and dry. No rash noted. He is not diaphoretic. No erythema.   Psychiatric: He has a normal mood and affect. His behavior is normal. Judgment and thought content normal.   Nursing note and vitals reviewed.        Assessment/Plan   Tank was seen today for anxiety.    Diagnoses and all orders for this visit:    Anxiety        Recommend increasing Zoloft to 25 mg qd.  Pt released to RTW March 25.

## 2019-03-28 ENCOUNTER — TELEPHONE (OUTPATIENT)
Dept: NEUROLOGY | Facility: CLINIC | Age: 49
End: 2019-03-28

## 2019-03-31 DIAGNOSIS — F41.9 ANXIETY: ICD-10-CM

## 2019-04-02 NOTE — TELEPHONE ENCOUNTER
Spoke to patient and he stated that he has been on anxiety medicine and he feels that is not the issue. He stated that he will discuss more at his next visit with Dr Pineda

## 2019-04-03 DIAGNOSIS — G51.0 BELL'S PALSY: ICD-10-CM

## 2019-04-04 ENCOUNTER — HOSPITAL ENCOUNTER (EMERGENCY)
Facility: HOSPITAL | Age: 49
Discharge: HOME | End: 2019-04-04
Attending: EMERGENCY MEDICINE | Admitting: EMERGENCY MEDICINE

## 2019-04-04 ENCOUNTER — APPOINTMENT (OUTPATIENT)
Dept: CT IMAGING | Facility: HOSPITAL | Age: 49
End: 2019-04-04

## 2019-04-04 VITALS
HEART RATE: 64 BPM | TEMPERATURE: 98.7 F | DIASTOLIC BLOOD PRESSURE: 60 MMHG | BODY MASS INDEX: 28.89 KG/M2 | WEIGHT: 218 LBS | RESPIRATION RATE: 18 BRPM | HEIGHT: 73 IN | SYSTOLIC BLOOD PRESSURE: 95 MMHG | OXYGEN SATURATION: 99 %

## 2019-04-04 DIAGNOSIS — F41.0 PANIC ATTACKS: ICD-10-CM

## 2019-04-04 DIAGNOSIS — R10.9 LEFT FLANK PAIN: Primary | ICD-10-CM

## 2019-04-04 LAB
ALBUMIN SERPL-MCNC: 4.61 G/DL (ref 3.2–4.8)
ALBUMIN/GLOB SERPL: 1.7 G/DL (ref 1.5–2.5)
ALP SERPL-CCNC: 92 U/L (ref 25–100)
ALT SERPL W P-5'-P-CCNC: 45 U/L (ref 7–40)
ANION GAP SERPL CALCULATED.3IONS-SCNC: 8 MMOL/L (ref 3–11)
AST SERPL-CCNC: 22 U/L (ref 0–33)
BASOPHILS # BLD AUTO: 0.02 10*3/MM3 (ref 0–0.2)
BASOPHILS NFR BLD AUTO: 0.2 % (ref 0–1)
BILIRUB SERPL-MCNC: 1.1 MG/DL (ref 0.3–1.2)
BILIRUB UR QL STRIP: NEGATIVE
BUN BLD-MCNC: 8 MG/DL (ref 9–23)
BUN/CREAT SERPL: 6.9 (ref 7–25)
CALCIUM SPEC-SCNC: 9.7 MG/DL (ref 8.7–10.4)
CHLORIDE SERPL-SCNC: 108 MMOL/L (ref 99–109)
CLARITY UR: CLEAR
CO2 SERPL-SCNC: 27 MMOL/L (ref 20–31)
COLOR UR: YELLOW
CREAT BLD-MCNC: 1.16 MG/DL (ref 0.6–1.3)
DEPRECATED RDW RBC AUTO: 46.1 FL (ref 37–54)
EOSINOPHIL # BLD AUTO: 0.03 10*3/MM3 (ref 0–0.3)
EOSINOPHIL NFR BLD AUTO: 0.3 % (ref 0–3)
ERYTHROCYTE [DISTWIDTH] IN BLOOD BY AUTOMATED COUNT: 13.2 % (ref 11.3–14.5)
GFR SERPL CREATININE-BSD FRML MDRD: 82 ML/MIN/1.73
GLOBULIN UR ELPH-MCNC: 2.8 GM/DL
GLUCOSE BLD-MCNC: 90 MG/DL (ref 70–100)
GLUCOSE UR STRIP-MCNC: NEGATIVE MG/DL
HCT VFR BLD AUTO: 51.6 % (ref 38.9–50.9)
HGB BLD-MCNC: 17.5 G/DL (ref 13.1–17.5)
HGB UR QL STRIP.AUTO: NEGATIVE
HOLD SPECIMEN: NORMAL
HOLD SPECIMEN: NORMAL
IMM GRANULOCYTES # BLD AUTO: 0.02 10*3/MM3 (ref 0–0.05)
IMM GRANULOCYTES NFR BLD AUTO: 0.2 % (ref 0–0.6)
KETONES UR QL STRIP: ABNORMAL
LEUKOCYTE ESTERASE UR QL STRIP.AUTO: NEGATIVE
LIPASE SERPL-CCNC: 35 U/L (ref 6–51)
LYMPHOCYTES # BLD AUTO: 2.56 10*3/MM3 (ref 0.6–4.8)
LYMPHOCYTES NFR BLD AUTO: 25 % (ref 24–44)
MCH RBC QN AUTO: 32 PG (ref 27–31)
MCHC RBC AUTO-ENTMCNC: 33.9 G/DL (ref 32–36)
MCV RBC AUTO: 94.3 FL (ref 80–99)
MONOCYTES # BLD AUTO: 0.65 10*3/MM3 (ref 0–1)
MONOCYTES NFR BLD AUTO: 6.3 % (ref 0–12)
NEUTROPHILS # BLD AUTO: 6.98 10*3/MM3 (ref 1.5–8.3)
NEUTROPHILS NFR BLD AUTO: 68.2 % (ref 41–71)
NITRITE UR QL STRIP: NEGATIVE
PH UR STRIP.AUTO: 5.5 [PH] (ref 5–8)
PLATELET # BLD AUTO: 201 10*3/MM3 (ref 150–450)
PMV BLD AUTO: 12.5 FL (ref 6–12)
POTASSIUM BLD-SCNC: 3.6 MMOL/L (ref 3.5–5.5)
PROT SERPL-MCNC: 7.4 G/DL (ref 5.7–8.2)
PROT UR QL STRIP: NEGATIVE
RBC # BLD AUTO: 5.47 10*6/MM3 (ref 4.2–5.76)
SODIUM BLD-SCNC: 143 MMOL/L (ref 132–146)
SP GR UR STRIP: 1.02 (ref 1–1.03)
TSH SERPL DL<=0.05 MIU/L-ACNC: 0.83 MIU/ML (ref 0.35–5.35)
UROBILINOGEN UR QL STRIP: ABNORMAL
WBC NRBC COR # BLD: 10.24 10*3/MM3 (ref 3.5–10.8)
WHOLE BLOOD HOLD SPECIMEN: NORMAL
WHOLE BLOOD HOLD SPECIMEN: NORMAL

## 2019-04-04 PROCEDURE — 85025 COMPLETE CBC W/AUTO DIFF WBC: CPT

## 2019-04-04 PROCEDURE — 83690 ASSAY OF LIPASE: CPT

## 2019-04-04 PROCEDURE — 74176 CT ABD & PELVIS W/O CONTRAST: CPT

## 2019-04-04 PROCEDURE — 80053 COMPREHEN METABOLIC PANEL: CPT

## 2019-04-04 PROCEDURE — 99284 EMERGENCY DEPT VISIT MOD MDM: CPT

## 2019-04-04 PROCEDURE — 99283 EMERGENCY DEPT VISIT LOW MDM: CPT

## 2019-04-04 PROCEDURE — 84443 ASSAY THYROID STIM HORMONE: CPT | Performed by: EMERGENCY MEDICINE

## 2019-04-04 PROCEDURE — 81003 URINALYSIS AUTO W/O SCOPE: CPT

## 2019-04-04 RX ORDER — SODIUM CHLORIDE 0.9 % (FLUSH) 0.9 %
10 SYRINGE (ML) INJECTION AS NEEDED
Status: DISCONTINUED | OUTPATIENT
Start: 2019-04-04 | End: 2019-04-05 | Stop reason: HOSPADM

## 2019-04-05 NOTE — ED PROVIDER NOTES
Subjective   Mr. Tank Tejeda is a 49 year old male smoker who presents to the ED with c/o flank pain.  Patient reports that over the past week he has experienced near constant pain in his left flank.  Patient reports the pain has not radiated nor changed since onset.  Patient tells me the pain lessens when he sits still but worsens considerably with movement.  He complains it has been accompanied by nausea and vomiting.  Presents to the emergency department tonight for evaluation of these symptoms.  Patient denies any accompanying urinary symptoms, fevers or chills.  No abdominal pain or chest pain.  No recent coughs, colds, or congestion.  Non-surgical abdomen.    Of note, patient endorses a long history of panic attacks and depression.  He tells me he constantly has panic attacks (which include heart palpitations, visual changes, and chest tightness) and estimates getting them 10 - 12 times a day recently.  He does not seem to have great psychiatric care but reports he is making changes.  In the emergency room, patient denies any chest tightness, visual changes, heart palpitations, or other hallmarks of his panic attacks at this time.  He has had suicidal thoughts but denies being suicidal.        History provided by:  Patient  Flank Pain   Pain location:  L flank  Pain radiates to:  Does not radiate  Pain severity:  Moderate  Onset quality:  Sudden  Duration:  1 week  Timing:  Constant  Progression:  Unchanged  Chronicity:  New  Relieved by:  Lying down  Worsened by:  Movement  Associated symptoms: nausea and vomiting    Associated symptoms: no chest pain, no chills, no cough, no dysuria, no fever, no hematuria and no shortness of breath    Risk factors: has not had multiple surgeries and not obese        Review of Systems   Constitutional: Negative for chills and fever.   HENT: Negative for congestion and rhinorrhea.    Respiratory: Negative for cough and shortness of breath.    Cardiovascular: Negative for  chest pain.   Gastrointestinal: Positive for nausea and vomiting.   Genitourinary: Positive for flank pain. Negative for decreased urine volume, difficulty urinating, dysuria, frequency, hematuria and urgency.   Psychiatric/Behavioral:        He denies being suicidal.   All other systems reviewed and are negative.      Past Medical History:   Diagnosis Date   • Allergic    • Bell's palsy    • Depression    • Seasonal allergies        Allergies   Allergen Reactions   • Trazodone Anaphylaxis   • Wellbutrin [Bupropion] Other (See Comments)     Panic attacks         Past Surgical History:   Procedure Laterality Date   • NO PAST SURGERIES         Family History   Problem Relation Age of Onset   • No Known Problems Mother    • Diabetes Father    • Birth defects Maternal Grandfather        Social History     Socioeconomic History   • Marital status:      Spouse name: Not on file   • Number of children: Not on file   • Years of education: Not on file   • Highest education level: Not on file   Occupational History   • Occupation:    Tobacco Use   • Smoking status: Current Every Day Smoker     Types: Cigarettes   • Smokeless tobacco: Never Used   Substance and Sexual Activity   • Alcohol use: No   • Drug use: No   • Sexual activity: Defer         Objective   Physical Exam   Constitutional: He is oriented to person, place, and time. He appears well-developed and well-nourished. No distress.   HENT:   Head: Normocephalic and atraumatic.   Nose: Nose normal.   Mouth/Throat: Oropharynx is clear and moist.   Airway patent.  Oropharynx benign.  Tenderness to the nasal bridge.  No swelling or injury.   Eyes: Conjunctivae are normal. No scleral icterus.   Neck: Normal range of motion and phonation normal. Neck supple. No thyromegaly present.   No thyromegaly.  No cervical lymphadenopathy.   Cardiovascular: Normal rate, regular rhythm and normal heart sounds. Exam reveals no gallop and no friction rub.   No  murmur heard.  Regular rate and rhythm.   Pulmonary/Chest: Effort normal and breath sounds normal. No respiratory distress. He has no wheezes. He has no rales.   Lungs clear.   Abdominal: Soft. Bowel sounds are normal. There is no tenderness. There is no guarding.   Abdomen soft and non-tender.   Musculoskeletal: He exhibits tenderness. He exhibits no edema.   No pretibial edema bilaterally.  Tenderness just below the posterior left ribcage.   Lymphadenopathy:     He has no cervical adenopathy.   Neurological: He is alert and oriented to person, place, and time.   Skin: Skin is warm and dry.   Psychiatric: He exhibits a depressed mood.   Depressed mood.  Flat affect.   Nursing note and vitals reviewed.      Procedures         ED Course       Recent Results (from the past 24 hour(s))   Light Blue Top    Collection Time: 04/04/19  7:45 PM   Result Value Ref Range    Extra Tube hold for add-on    Lavender Top    Collection Time: 04/04/19  7:45 PM   Result Value Ref Range    Extra Tube hold for add-on    Gold Top - SST    Collection Time: 04/04/19  7:45 PM   Result Value Ref Range    Extra Tube Hold for add-ons.    CBC Auto Differential    Collection Time: 04/04/19  7:45 PM   Result Value Ref Range    WBC 10.24 3.50 - 10.80 10*3/mm3    RBC 5.47 4.20 - 5.76 10*6/mm3    Hemoglobin 17.5 13.1 - 17.5 g/dL    Hematocrit 51.6 (H) 38.9 - 50.9 %    MCV 94.3 80.0 - 99.0 fL    MCH 32.0 (H) 27.0 - 31.0 pg    MCHC 33.9 32.0 - 36.0 g/dL    RDW 13.2 11.3 - 14.5 %    RDW-SD 46.1 37.0 - 54.0 fl    MPV 12.5 (H) 6.0 - 12.0 fL    Platelets 201 150 - 450 10*3/mm3    Neutrophil % 68.2 41.0 - 71.0 %    Lymphocyte % 25.0 24.0 - 44.0 %    Monocyte % 6.3 0.0 - 12.0 %    Eosinophil % 0.3 0.0 - 3.0 %    Basophil % 0.2 0.0 - 1.0 %    Immature Grans % 0.2 0.0 - 0.6 %    Neutrophils, Absolute 6.98 1.50 - 8.30 10*3/mm3    Lymphocytes, Absolute 2.56 0.60 - 4.80 10*3/mm3    Monocytes, Absolute 0.65 0.00 - 1.00 10*3/mm3    Eosinophils, Absolute 0.03 0.00  - 0.30 10*3/mm3    Basophils, Absolute 0.02 0.00 - 0.20 10*3/mm3    Immature Grans, Absolute 0.02 0.00 - 0.05 10*3/mm3   Comprehensive Metabolic Panel    Collection Time: 04/04/19  7:46 PM   Result Value Ref Range    Glucose 90 70 - 100 mg/dL    BUN 8 (L) 9 - 23 mg/dL    Creatinine 1.16 0.60 - 1.30 mg/dL    Sodium 143 132 - 146 mmol/L    Potassium 3.6 3.5 - 5.5 mmol/L    Chloride 108 99 - 109 mmol/L    CO2 27.0 20.0 - 31.0 mmol/L    Calcium 9.7 8.7 - 10.4 mg/dL    Total Protein 7.4 5.7 - 8.2 g/dL    Albumin 4.61 3.20 - 4.80 g/dL    ALT (SGPT) 45 (H) 7 - 40 U/L    AST (SGOT) 22 0 - 33 U/L    Alkaline Phosphatase 92 25 - 100 U/L    Total Bilirubin 1.1 0.3 - 1.2 mg/dL    eGFR  African Amer 82 >60 mL/min/1.73    Globulin 2.8 gm/dL    A/G Ratio 1.7 1.5 - 2.5 g/dL    BUN/Creatinine Ratio 6.9 (L) 7.0 - 25.0    Anion Gap 8.0 3.0 - 11.0 mmol/L   Lipase    Collection Time: 04/04/19  7:46 PM   Result Value Ref Range    Lipase 35 6 - 51 U/L   Green Top (Gel)    Collection Time: 04/04/19  7:46 PM   Result Value Ref Range    Extra Tube Hold for add-ons.    Urinalysis With Microscopic If Indicated (No Culture) - Urine, Clean Catch    Collection Time: 04/04/19  7:46 PM   Result Value Ref Range    Color, UA Yellow Yellow, Straw    Appearance, UA Clear Clear    pH, UA 5.5 5.0 - 8.0    Specific Gravity, UA 1.024 1.001 - 1.030    Glucose, UA Negative Negative    Ketones, UA 15 mg/dL (1+) (A) Negative    Bilirubin, UA Negative Negative    Blood, UA Negative Negative    Protein, UA Negative Negative    Leuk Esterase, UA Negative Negative    Nitrite, UA Negative Negative    Urobilinogen, UA 0.2 E.U./dL 0.2 - 1.0 E.U./dL   TSH    Collection Time: 04/04/19  7:46 PM   Result Value Ref Range    TSH 0.831 0.350 - 5.350 mIU/mL     Note: In addition to lab results from this visit, the labs listed above may include labs taken at another facility or during a different encounter within the last 24 hours. Please correlate lab times with ED admission  and discharge times for further clarification of the services performed during this visit.    CT Abdomen Pelvis Without Contrast   Final Result   Negative CT of the abdomen and pelvis. Normal appendix. No renal or bowel or biliary obstruction.         Signer Name: Roberto Padilla MD    Signed: 4/4/2019 10:23 PM    Workstation Name: [id number]            Vitals:    04/04/19 2100 04/04/19 2130 04/04/19 2200 04/04/19 2230   BP: 118/89 122/91 113/86 95/60   BP Location:       Patient Position:       Pulse: 62 64  64   Resp:    18   Temp:       TempSrc:       SpO2: 99% 99%  99%   Weight:       Height:         Medications - No data to display  ECG/EMG Results (last 24 hours)     ** No results found for the last 24 hours. **        No orders to display                     MDM    Final diagnoses:   Left flank pain   Panic attacks       Documentation assistance provided by isaura Morris.  Information recorded by the scribe was done at my direction and has been verified and validated by me.     Romain Morris  04/04/19 [id number]       Reggie Garcia MD  04/05/19 014

## 2019-04-05 NOTE — DISCHARGE INSTRUCTIONS
Please follow up with your primary care provider.    Continue with your efforts to get good psychiatric care.    Return with any acute, emergency , or worsening symptoms.

## 2019-04-06 RX ORDER — SERTRALINE HYDROCHLORIDE 25 MG/1
TABLET, FILM COATED ORAL
Qty: 30 TABLET | Refills: 0 | Status: SHIPPED | OUTPATIENT
Start: 2019-04-06 | End: 2019-04-18

## 2019-04-18 ENCOUNTER — OFFICE VISIT (OUTPATIENT)
Dept: NEUROLOGY | Facility: CLINIC | Age: 49
End: 2019-04-18

## 2019-04-18 VITALS
DIASTOLIC BLOOD PRESSURE: 84 MMHG | OXYGEN SATURATION: 99 % | SYSTOLIC BLOOD PRESSURE: 132 MMHG | HEART RATE: 98 BPM | BODY MASS INDEX: 30.52 KG/M2 | WEIGHT: 218 LBS | HEIGHT: 71 IN

## 2019-04-18 DIAGNOSIS — R25.1 TREMORS OF NERVOUS SYSTEM: ICD-10-CM

## 2019-04-18 DIAGNOSIS — H53.9 VISION CHANGES: Primary | ICD-10-CM

## 2019-04-18 PROCEDURE — 99214 OFFICE O/P EST MOD 30 MIN: CPT | Performed by: PSYCHIATRY & NEUROLOGY

## 2019-04-18 RX ORDER — PROPRANOLOL HCL 60 MG
60 CAPSULE, EXTENDED RELEASE 24HR ORAL DAILY
Qty: 30 CAPSULE | Refills: 3 | Status: SHIPPED | OUTPATIENT
Start: 2019-04-18 | End: 2019-05-13

## 2019-04-18 NOTE — PROGRESS NOTES
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  Today patient tells me that he continues to have tremors in his hands that are worse when reaching out for objects as well as dizziness and altered depth perception.  Denies any family history of tremors.  He is convinced that he either has MS or Parkinson's disease.  He also continues to have anxiety and depressive symptoms however has stopped taking clorazepate and Zoloft as he feels that these were causing side effects and made his symptoms worse.      Initial [id number] -American male with a history of anxiety, depression, Bell's palsy now presents with tremors and muscle spasms.  As per patient he had complete right facial Alan spells he in October of last year.  He was treated with Valtrex and steroids  after the onset of symptoms however he continues to have residual right facial weakness.  He denies a history of rash, viral illnesses or tick bite.  Since the event he has been having worsening anxiety and complains of a multitude of symptoms including dizziness and lightheadedness that occurs with changes in position or if he sees movement like looking at the TV screen or while seeing windshield wipers while driving..  He also gets tremors in his hands and occasionally his left  leg with spasms in his leg.  Denies any weakness or numbness.   He has been to the ER multiple times with the symptoms including last night when he was having the spasms in his legs and was prescribed Atarax for anxiety.  He has also had 2 CT scans of the head that have been normal.   Of note-I personally reviewed his ER notes, CT scans of the head and his PCP Dr. Abraham note.  She had started him on Lexapro however that worsened his anxiety hence she stopped it and switched him  to Toprol-XL instead the patient did not start Toprol either due to fear of side effects.    The following portions of the patient's history were reviewed today and updated as of 04/18/2019  : allergies, current medications, past family history, past medical history, past social history, past surgical history and problem list  These document will be scanned to patient's chart.      Current Outpatient Medications:   •  fluocinonide-emollient (LIDEX-E) 0.05 % cream, Apply  topically to the appropriate area as directed Daily., Disp: 60 g, Rfl: 0  •  valACYclovir (VALTREX) 500 MG tablet, Take 1 tablet by mouth Daily. (Patient taking differently: Take 500 mg by mouth Daily As Needed.), Disp: 30 tablet, Rfl: 3  •  propranolol LA (INDERAL LA) 60 MG 24 hr capsule, Take 1 capsule by mouth Daily., Disp: 30 capsule, Rfl: 3   Past Medical History:   Diagnosis Date   • Allergic    • Bell's palsy    • Depression    • Seasonal allergies       Past Surgical History:   Procedure Laterality Date   • NO PAST SURGERIES        Family History   Problem Relation Age of Onset   • No Known Problems Mother    • Diabetes Father    • Birth defects Maternal Grandfather       Social History     Socioeconomic History   • Marital status:      Spouse name: Not on file   • Number of children: Not on file   • Years of education: Not on file   • Highest education level: Not on file   Occupational History   • Occupation:    Tobacco Use   • Smoking status: Current Every Day Smoker     Types: Cigarettes   • Smokeless tobacco: Never Used   Substance and Sexual Activity   • Alcohol use: No   • Drug use: No   • Sexual activity: Defer     Review of Systems   Constitutional: Positive for activity change, appetite change, fatigue and unexpected weight loss.   Eyes: Positive for photophobia, pain, itching and visual disturbance.   Gastrointestinal: Positive for diarrhea and nausea.   Musculoskeletal: Positive for myalgias, neck pain and  "neck stiffness.   Neurological: Positive for dizziness, tremors, speech difficulty, weakness, numbness, headache and confusion.   Psychiatric/Behavioral: Positive for agitation, behavioral problems, decreased concentration, sleep disturbance and stress. The patient is nervous/anxious.    All other systems reviewed and are negative.      Objective:    /84 (BP Location: Left arm, Patient Position: Sitting, Cuff Size: Adult)   Pulse 98   Ht 180.3 cm (71\")   Wt 98.9 kg (218 lb)   SpO2 99%   BMI 30.40 kg/m²     Neurology Exam:    General apperance: NAD.     Mental status: Alert, awake and oriented to time place and person.    Recent and Remote memory: Intact.    Attention span and Concentration: Normal.     Language and Speech: Intact- No dysarthria.    Fluency, Naming , Repitition and Comprehension:  Intact    Cranial Nerves:   CN II: Visual fields are full. Intact. Fundi - Normal, No papillederma, Pupils - ROSA  CN III, IV and VI: Extraocular movements are intact. Normal saccades.   CN V: Facial sensation is intact.   CN VII: Muscles of facial expression reveal asymmetry on the right  CN VIII: Hearing is intact. Whispered voice intact.   CN IX and X: Palate elevates symmetrically. Intact  CN XI: Shoulder shrug is intact.   CN XII: Tongue is midline without evidence of atrophy or fasciculation.     Ophthalmoscopic exam of optic disc-normal    Motor:-No resting or postural tremors noted.  Extremely mild kinetic tremors on the left.  Again no tremors noted on drawing a De Luna's wheel or writing  Right UE muscle strength 5/5. Normal tone.     Left UE muscle strength 5/5. Normal tone.      Right LE muscle strength5/5. Normal tone.     Left LE muscle strength 5/5. Normal tone.      Sensory: Normal light touch, vibration and pinprick sensation bilaterally.    DTRs: 2+ bilaterally in upper and lower extremities.    Babinski: Negative bilaterally.    Co-ordination: Normal finger-to-nose, heel to shin B/L.    Rhomberg: " Negative.    Gait: Normal.  Difficulty with tandem walking    Cardiovascular: Regular rate and rhythm without murmur, gallop or rub.    Assessment and Plan:  1. Tremors of nervous system  Patient may have physiological tremors worsened by anxiety.  I reassured the patient that he has no signs or symptoms of Parkinson's disease  I will start him on low doses of Inderal LA 60 mg at night  I will obtain his MRI disc and review it personally    2. Vision changes  He is complaining of altered depth perception along with occasional blurriness of vision as well as balance problems.  I will refer him to ophthalmology  - Ambulatory Referral to Ophthalmology       Return in about 6 weeks (around 5/30/2019).     I spent over 25  minutes with the patient face to face out of which over 50% (15  minutes) was spent in management, instructions and education regarding his tremors and other symptoms.     Claudette Pineda MD

## 2019-04-23 ENCOUNTER — TELEPHONE (OUTPATIENT)
Dept: NEUROLOGY | Facility: CLINIC | Age: 49
End: 2019-04-23

## 2019-04-23 NOTE — TELEPHONE ENCOUNTER
Regarding: Test Results Question  Contact: [phone]  ----- Message from Eligible, Generic sent at 4/22/2019  8:38 PM EDT -----    If this pineal gland cyst is symptomatic, which I believe it is, how do I go about having it removed?

## 2019-04-24 ENCOUNTER — TELEPHONE (OUTPATIENT)
Dept: NEUROLOGY | Facility: CLINIC | Age: 49
End: 2019-04-24

## 2019-04-24 NOTE — TELEPHONE ENCOUNTER
Patient called and advised that he wanted to see if you have been able to review MRI that he dropped off to see if you agreed with what report says

## 2019-04-24 NOTE — TELEPHONE ENCOUNTER
Regarding: Prescription Question  Contact: [phone]  ----- Message from Mychart, Generic sent at 4/24/2019  9:53 AM EDT -----    When you saw me last you told me you could prescribe me buspirone. I’ve had a bad experience with the propanolol. Would you prescribe the anxiety med for me and send it to [facility] pharmacy? Thank you. Also I’m having terrible balance and vision problems and can’t drive. I dropped my MRI disk off on Monday. I took an anxiety med and still had all the same  symptoms. I know there is a neurological explanation  for my tremors, balance and vision outside of anxiety even though I still have anxiety problems.   Please advise. I’ve not been able to work or drive due to them. There is something very wrong and no one  seems to be able to tell me what it is.

## 2019-04-26 RX ORDER — BUSPIRONE HYDROCHLORIDE 5 MG/1
TABLET ORAL
Qty: 90 TABLET | Refills: 3 | Status: SHIPPED | OUTPATIENT
Start: 2019-04-26 | End: 2019-07-22

## 2019-04-26 NOTE — TELEPHONE ENCOUNTER
Leaved detailed message for patient advising him that MRI is normal and that cyst is very small and should not be causing any symptoms . Advised patient if he had any questions he can give me a call

## 2019-04-26 NOTE — TELEPHONE ENCOUNTER
Tell him that I have sent in a prescription of buspirone 5 mg.  He will take 1-1/2 tablets twice a day.  Also I looked at his MRI.  His pineal cyst is very small and it is not blocking anything hence I again feel that the symptoms are not caused by the cyst.  His MRI looked normal otherwise

## 2019-05-13 ENCOUNTER — OFFICE VISIT (OUTPATIENT)
Dept: FAMILY MEDICINE CLINIC | Facility: CLINIC | Age: 49
End: 2019-05-13

## 2019-05-13 VITALS
BODY MASS INDEX: 29.03 KG/M2 | RESPIRATION RATE: 15 BRPM | OXYGEN SATURATION: 98 % | SYSTOLIC BLOOD PRESSURE: 110 MMHG | DIASTOLIC BLOOD PRESSURE: 80 MMHG | HEIGHT: 73 IN | WEIGHT: 219 LBS | TEMPERATURE: 98.9 F | HEART RATE: 95 BPM

## 2019-05-13 DIAGNOSIS — H54.7 DECREASED VISION: ICD-10-CM

## 2019-05-13 DIAGNOSIS — F41.9 ANXIETY: ICD-10-CM

## 2019-05-13 DIAGNOSIS — R25.1 TREMOR: Primary | ICD-10-CM

## 2019-05-13 DIAGNOSIS — R26.89 BALANCE DISORDER: ICD-10-CM

## 2019-05-13 PROCEDURE — 86704 HEP B CORE ANTIBODY TOTAL: CPT | Performed by: FAMILY MEDICINE

## 2019-05-13 PROCEDURE — 82607 VITAMIN B-12: CPT | Performed by: FAMILY MEDICINE

## 2019-05-13 PROCEDURE — 86803 HEPATITIS C AB TEST: CPT | Performed by: FAMILY MEDICINE

## 2019-05-13 PROCEDURE — 86317 IMMUNOASSAY INFECTIOUS AGENT: CPT | Performed by: FAMILY MEDICINE

## 2019-05-13 PROCEDURE — 99214 OFFICE O/P EST MOD 30 MIN: CPT | Performed by: FAMILY MEDICINE

## 2019-05-13 PROCEDURE — 86708 HEPATITIS A ANTIBODY: CPT | Performed by: FAMILY MEDICINE

## 2019-05-13 PROCEDURE — 86706 HEP B SURFACE ANTIBODY: CPT | Performed by: FAMILY MEDICINE

## 2019-05-13 PROCEDURE — 36415 COLL VENOUS BLD VENIPUNCTURE: CPT | Performed by: FAMILY MEDICINE

## 2019-05-13 PROCEDURE — 87340 HEPATITIS B SURFACE AG IA: CPT | Performed by: FAMILY MEDICINE

## 2019-05-13 NOTE — PROGRESS NOTES
Subjective   Tank Tejeda is a 49 y.o. male.     History of Present Illness   Has recently seen Neurology. Had stable MRI.  Pt still has trouble with balance issues and decreased vision. Wears glasses but feels his depth perception is off.   Has also seen ENT for balance issues and had ears cleaned out.    Has been on Buspar for anxiety for 2 weeks with little relief.  Has also seen psych in the past for anxiety and DNT vistaril or propranolol.  Pt is interested in extending LA for work.        The following portions of the patient's history were reviewed and updated as appropriate: allergies, current medications, past family history, past medical history, past social history, past surgical history and problem list.  Vitals:    05/13/19 1210   BP: 110/80   Pulse: 95   Resp: 15   Temp: 98.9 °F (37.2 °C)   SpO2: 98%     Body mass index is 28.89 kg/m².  Review of Systems   Constitutional: Negative.  Negative for activity change, fatigue, fever, unexpected weight gain and unexpected weight loss.   HENT: Negative.  Negative for congestion, sneezing and sore throat.    Eyes: Negative.  Negative for blurred vision, double vision and visual disturbance.   Respiratory: Negative.  Negative for cough, chest tightness, shortness of breath and wheezing.    Cardiovascular: Negative.  Negative for chest pain, palpitations and leg swelling.   Gastrointestinal: Negative.  Negative for abdominal distention, abdominal pain, blood in stool, constipation, diarrhea and nausea.   Endocrine: Negative.  Negative for cold intolerance and heat intolerance.   Genitourinary: Negative.  Negative for urinary incontinence, dysuria, frequency and urgency.   Musculoskeletal: Negative.  Negative for arthralgias and myalgias.   Skin: Negative.  Negative for rash.   Allergic/Immunologic: Negative.    Neurological: Negative.  Negative for dizziness, syncope, numbness and memory problem.   Hematological: Negative.  Negative for adenopathy.    Psychiatric/Behavioral: Negative.  Negative for suicidal ideas and depressed mood. The patient is not nervous/anxious.    All other systems reviewed and are negative.      Objective   Physical Exam   Constitutional: He is oriented to person, place, and time. He appears well-developed and well-nourished.   HENT:   Head: Normocephalic.   Right Ear: External ear normal.   Left Ear: External ear normal.   Nose: Nose normal.   Mouth/Throat: Oropharynx is clear and moist. No oropharyngeal exudate.   Eyes: Conjunctivae and EOM are normal. Pupils are equal, round, and reactive to light.   Neck: Normal range of motion. Neck supple. No thyromegaly present.   Cardiovascular: Normal rate, regular rhythm, normal heart sounds and intact distal pulses.   No murmur heard.  Pulmonary/Chest: Effort normal and breath sounds normal. No respiratory distress. He exhibits no tenderness.   Abdominal: Soft. Bowel sounds are normal. He exhibits no distension and no mass. There is no tenderness. There is no rebound and no guarding.   Musculoskeletal: Normal range of motion.   Lymphadenopathy:     He has no cervical adenopathy.   Neurological: He is alert and oriented to person, place, and time. He has normal reflexes. He displays normal reflexes. He exhibits normal muscle tone. Coordination normal.   Skin: Skin is warm and dry. No rash noted. He is not diaphoretic. No erythema.   Psychiatric: He has a normal mood and affect. His behavior is normal. Judgment and thought content normal.   Nursing note and vitals reviewed.          Assessment/Plan   Tank was seen today for balance issues, eye problem and tremors.    Diagnoses and all orders for this visit:    Tremor  -     Ambulatory Referral to Neurology  -     Hepatitis C Antibody  -     Hepatitis A Antibody, Total  -     Hepatitis B Surf Antibody Quant  -     Hepatitis B Surface Antigen  -     Hepatitis B Surface Antibody  -     Hepatitis B Core Antibody, Total  -     Lyme, Total Antibody  Test / Reflex; Future    Decreased vision  -     Ambulatory Referral to Ophthalmology    Balance disorder  -     Ambulatory Referral to Neurology  -     Hepatitis C Antibody  -     Hepatitis A Antibody, Total  -     Hepatitis B Surf Antibody Quant  -     Hepatitis B Surface Antigen  -     Hepatitis B Surface Antibody  -     Hepatitis B Core Antibody, Total  -     Lyme, Total Antibody Test / Reflex; Future  -     Ammonia; Future  -     Vitamin B12    Anxiety

## 2019-05-14 LAB
HBV SURFACE AB SER RIA-ACNC: REACTIVE
HBV SURFACE AG SERPL QL IA: NORMAL
HCV AB SER DONR QL: NORMAL
VIT B12 BLD-MCNC: 343 PG/ML (ref 211–946)

## 2019-05-14 RX ORDER — SERTRALINE HYDROCHLORIDE 25 MG/1
TABLET, FILM COATED ORAL
Qty: 30 TABLET | Refills: 0 | OUTPATIENT
Start: 2019-05-14

## 2019-05-15 LAB
HAV AB SER QL IA: NEGATIVE
HBV CORE AB SER DONR QL IA: NEGATIVE
HBV SURFACE AB SER-ACNC: <3.1 MIU/ML

## 2019-05-30 ENCOUNTER — TELEPHONE (OUTPATIENT)
Dept: FAMILY MEDICINE CLINIC | Facility: CLINIC | Age: 49
End: 2019-05-30

## 2019-05-30 NOTE — TELEPHONE ENCOUNTER
Regarding: FW: Prescription Question  Contact: [phone]  Yes, please let patient know that he can obtain hepatitis B series at our office and can be done the next time that he is in here  ----- Message -----  From: Yeny Mas MA  Sent: 5/30/2019   8:07 AM  To: Nadira Abraham DO  Subject: FW: Prescription Question                            ----- Message -----  From: Tank Tejeda  Sent: 5/29/2019   5:30 PM  To: Rachel Puga [facility]  Subject: RE: Prescription Question                        ----- Message from Mychart, Generic sent at 5/29/2019  5:30 PM EDT -----    Can I get the Hep B vaccine at your office? If not, where? Thank you   ----- Message -----  From: Nadira Abraham DO  Sent: 5/17/2019 11:19 AM EDT  To: Tank Tejeda  Subject: RE: Prescription Question  I recommend follow-up with behavioral health regarding if you will be taking Zoloft or BuSpar.  The hepatitis B surface antibody will be positive if you have ever had vaccine in the past, however the quantitative shows that you probably need another hepatitis B vaccine booster.  If you have not had hepatitis B vaccine at all ever and the quantitative is negative then the other test could be a false positive.     ----- Message -----     From: Tank Tejeda     Sent: 5/14/2019  3:14 PM EDT       To: Nadira Abraham DO  Subject: Prescription Question    I got a message in [employer] stating that my Zoloft request had been  denied. I wasn’t expecting to continue   On Zoloft. You were going to extend my Buspirone prescription but in actuality I’ve had to discontinue  using it. It’s causing facial tics and increased shaking  the longer I use it along with extremely blurry vision  and   Confusion. Any time I take a behavioral med and it reaches  levels after 2 weeks I have the same issues. I actually  didn’t have any of these symptoms until I started  using anti depressants  and anxiety meds. I honestly  believe that these meds are the root cause   of all of my ongoing  issues.  I have read that in rare cases these meds cause all of these issues and in some cases the symptoms are irreversible.

## 2019-07-22 ENCOUNTER — HOSPITAL ENCOUNTER (OUTPATIENT)
Dept: GENERAL RADIOLOGY | Facility: HOSPITAL | Age: 49
Discharge: HOME | End: 2019-07-22
Admitting: FAMILY MEDICINE

## 2019-07-22 ENCOUNTER — OFFICE VISIT (OUTPATIENT)
Dept: FAMILY MEDICINE CLINIC | Facility: CLINIC | Age: 49
End: 2019-07-22

## 2019-07-22 VITALS
HEART RATE: 78 BPM | HEIGHT: 73 IN | BODY MASS INDEX: 29.82 KG/M2 | WEIGHT: 225 LBS | DIASTOLIC BLOOD PRESSURE: 80 MMHG | TEMPERATURE: 98.1 F | SYSTOLIC BLOOD PRESSURE: 116 MMHG | RESPIRATION RATE: 18 BRPM | OXYGEN SATURATION: 98 %

## 2019-07-22 DIAGNOSIS — R10.9 FLANK PAIN: ICD-10-CM

## 2019-07-22 DIAGNOSIS — R61 SWEATING INCREASE: Primary | ICD-10-CM

## 2019-07-22 DIAGNOSIS — R06.00 DYSPNEA, UNSPECIFIED TYPE: ICD-10-CM

## 2019-07-22 LAB
ALBUMIN SERPL-MCNC: 4.2 G/DL (ref 3.5–5.2)
ALBUMIN/GLOB SERPL: 1.6 G/DL
ALP SERPL-CCNC: 99 U/L (ref 39–117)
ALT SERPL W P-5'-P-CCNC: 38 U/L (ref 1–41)
ANION GAP SERPL CALCULATED.3IONS-SCNC: 12.3 MMOL/L (ref 5–15)
AST SERPL-CCNC: 22 U/L (ref 1–40)
BILIRUB BLD-MCNC: NEGATIVE MG/DL
BILIRUB SERPL-MCNC: 0.4 MG/DL (ref 0.2–1.2)
BUN BLD-MCNC: 16 MG/DL (ref 6–20)
BUN/CREAT SERPL: 12.7 (ref 7–25)
CALCIUM SPEC-SCNC: 9.3 MG/DL (ref 8.6–10.5)
CHLORIDE SERPL-SCNC: 103 MMOL/L (ref 98–107)
CHOLEST SERPL-MCNC: 218 MG/DL (ref 0–200)
CLARITY, POC: CLEAR
CO2 SERPL-SCNC: 23.7 MMOL/L (ref 22–29)
COLOR UR: YELLOW
CREAT BLD-MCNC: 1.26 MG/DL (ref 0.76–1.27)
ERYTHROCYTE [SEDIMENTATION RATE] IN BLOOD: 6 MM/HR (ref 0–15)
GFR SERPL CREATININE-BSD FRML MDRD: 74 ML/MIN/1.73
GLOBULIN UR ELPH-MCNC: 2.7 GM/DL
GLUCOSE BLD-MCNC: 130 MG/DL (ref 65–99)
GLUCOSE UR STRIP-MCNC: NEGATIVE MG/DL
HBA1C MFR BLD: 5.5 %
HCT VFR BLDA CALC: 44.6 % (ref 38–51)
HDLC SERPL-MCNC: 34 MG/DL (ref 40–60)
HGB BLDA-MCNC: 14.8 G/DL (ref 12–17)
HIV1+2 AB SER QL: NORMAL
KETONES UR QL: NEGATIVE
LDLC SERPL CALC-MCNC: 146 MG/DL (ref 0–100)
LDLC/HDLC SERPL: 4.3 {RATIO}
LEUKOCYTE EST, POC: NEGATIVE
MCH, POC: 31.8
MCHC, POC: 33.2
MCV, POC: 96
NITRITE UR-MCNC: NEGATIVE MG/ML
PH UR: 5.5 [PH] (ref 5–8)
PLATELET # BLD AUTO: 143 10*3/MM3
PMV BLD: 9.9 FL
POTASSIUM BLD-SCNC: 3.9 MMOL/L (ref 3.5–5.2)
PROT SERPL-MCNC: 6.9 G/DL (ref 6–8.5)
PROT UR STRIP-MCNC: NEGATIVE MG/DL
RBC # UR STRIP: NEGATIVE /UL
RBC, POC: 4.65
RDW, POC: 13.7
SODIUM BLD-SCNC: 139 MMOL/L (ref 136–145)
SP GR UR: 1.03 (ref 1–1.03)
TRIGL SERPL-MCNC: 189 MG/DL (ref 0–150)
UROBILINOGEN UR QL: NORMAL
VLDLC SERPL-MCNC: 37.8 MG/DL (ref 5–40)
WBC # BLD: 7.8 10*3/UL

## 2019-07-22 PROCEDURE — G0432 EIA HIV-1/HIV-2 SCREEN: HCPCS | Performed by: FAMILY MEDICINE

## 2019-07-22 PROCEDURE — 83036 HEMOGLOBIN GLYCOSYLATED A1C: CPT | Performed by: FAMILY MEDICINE

## 2019-07-22 PROCEDURE — 36415 COLL VENOUS BLD VENIPUNCTURE: CPT | Performed by: FAMILY MEDICINE

## 2019-07-22 PROCEDURE — 93000 ELECTROCARDIOGRAM COMPLETE: CPT | Performed by: FAMILY MEDICINE

## 2019-07-22 PROCEDURE — 87086 URINE CULTURE/COLONY COUNT: CPT | Performed by: FAMILY MEDICINE

## 2019-07-22 PROCEDURE — 82164 ANGIOTENSIN I ENZYME TEST: CPT | Performed by: FAMILY MEDICINE

## 2019-07-22 PROCEDURE — 80053 COMPREHEN METABOLIC PANEL: CPT | Performed by: FAMILY MEDICINE

## 2019-07-22 PROCEDURE — 85652 RBC SED RATE AUTOMATED: CPT | Performed by: FAMILY MEDICINE

## 2019-07-22 PROCEDURE — 80061 LIPID PANEL: CPT | Performed by: FAMILY MEDICINE

## 2019-07-22 PROCEDURE — 99214 OFFICE O/P EST MOD 30 MIN: CPT | Performed by: FAMILY MEDICINE

## 2019-07-22 PROCEDURE — 71046 X-RAY EXAM CHEST 2 VIEWS: CPT

## 2019-07-22 PROCEDURE — 85027 COMPLETE CBC AUTOMATED: CPT | Performed by: FAMILY MEDICINE

## 2019-07-22 PROCEDURE — 81002 URINALYSIS NONAUTO W/O SCOPE: CPT | Performed by: FAMILY MEDICINE

## 2019-07-22 RX ORDER — FLUTICASONE PROPIONATE 50 MCG
SPRAY, SUSPENSION (ML) NASAL
COMMUNITY
End: 2021-06-08 | Stop reason: SDUPTHER

## 2019-07-22 RX ORDER — CLOBETASOL PROPIONATE 0.5 MG/G
OINTMENT TOPICAL
COMMUNITY
Start: 2019-06-24 | End: 2019-08-20 | Stop reason: SDUPTHER

## 2019-07-22 RX ORDER — PROPRANOLOL HCL 60 MG
CAPSULE, EXTENDED RELEASE 24HR ORAL
COMMUNITY
End: 2019-07-22

## 2019-07-22 RX ORDER — DIAZEPAM 5 MG/1
TABLET ORAL
COMMUNITY
Start: 2019-06-05 | End: 2019-07-22

## 2019-07-22 NOTE — PROGRESS NOTES
Subjective   Tank Tejeda is a 49 y.o. male.     History of Present Illness   Has recently been following with Neuro. Has had a normal spinal test. Still has vision and balance issues. Will be having an EEG. Took Valium and a steroid pack with slight relief. Denies anxiety and not needing meds. Pt is back to work now. Alan's Palsy has improved somewhat.  Pt c/o increased sweating and increased urination. C/O bilat flank and back pain. Quit smoking 1 week ago. Has increased fatigue and shortness of breath.  The following portions of the patient's history were reviewed and updated as appropriate: allergies, current medications, past family history, past medical history, past social history, past surgical history and problem list.  Vitals:    07/22/19 1401   BP: 116/80   Pulse: 78   Resp: 18   Temp: 98.1 °F (36.7 °C)   SpO2: 98%     Body mass index is 29.69 kg/m².  Review of Systems   Constitutional: Negative.  Negative for activity change, fatigue, fever, unexpected weight gain and unexpected weight loss.   HENT: Negative.  Negative for congestion, sneezing and sore throat.    Eyes: Negative.  Negative for blurred vision, double vision and visual disturbance.   Respiratory: Negative.  Negative for cough, chest tightness, shortness of breath and wheezing.    Cardiovascular: Negative.  Negative for chest pain, palpitations and leg swelling.   Gastrointestinal: Negative.  Negative for abdominal distention, abdominal pain, blood in stool, constipation, diarrhea and nausea.   Endocrine: Negative.  Negative for cold intolerance and heat intolerance.   Genitourinary: Negative.  Negative for urinary incontinence, dysuria, frequency and urgency.   Musculoskeletal: Negative.  Negative for arthralgias and myalgias.   Skin: Negative.  Negative for rash.   Allergic/Immunologic: Negative.    Neurological: Negative.  Negative for dizziness, syncope, numbness and memory problem.   Hematological: Negative.  Negative for adenopathy.    Psychiatric/Behavioral: Negative.  Negative for suicidal ideas and depressed mood. The patient is not nervous/anxious.    All other systems reviewed and are negative.      Objective   Physical Exam   Constitutional: He is oriented to person, place, and time. He appears well-developed and well-nourished. No distress.   HENT:   Right Ear: External ear normal.   Left Ear: External ear normal.   Nose: Nose normal.   Mouth/Throat: Oropharynx is clear and moist.   Eyes: Conjunctivae and EOM are normal. Pupils are equal, round, and reactive to light.   Neck: Normal range of motion. Neck supple. No thyromegaly present.   Cardiovascular: Normal rate, regular rhythm and normal heart sounds.   No murmur heard.  Pulmonary/Chest: Effort normal and breath sounds normal. No respiratory distress. He has no wheezes.   Abdominal: Soft. Bowel sounds are normal. He exhibits no distension and no mass. There is no tenderness. There is no rebound and no guarding. No hernia.   Musculoskeletal: Normal range of motion. He exhibits no edema or tenderness.   Lymphadenopathy:     He has no cervical adenopathy.   Neurological: He is alert and oriented to person, place, and time. He has normal reflexes.   Skin: Skin is warm and dry. No rash noted. He is not diaphoretic. No erythema. No pallor.   Psychiatric: He has a normal mood and affect. His behavior is normal. Judgment and thought content normal.   Nursing note and vitals reviewed.      ECG 12 Lead  Date/Time: 7/22/2019 2:24 PM  Performed by: Nadira Abraham DO  Authorized by: Nadira Abraham DO   Comparison: not compared with previous ECG   Previous ECG: no previous ECG available  Rhythm: sinus rhythm  Rate: normal  BPM: 67  Conduction: conduction normal  ST Segments: ST segments normal  T Waves: T waves normal  QRS axis: normal  Other: no other findings    Clinical impression: normal ECG          Spirometry: FVC 90%, FEV1 92%  U/A + ketones  CBC- normal  HBAIC 5.5      Assessment/Plan    Tank was seen today for night sweats and back pain.    Diagnoses and all orders for this visit:    Sweating increase  -     Comprehensive Metabolic Panel  -     Cancel: Hemoglobin A1c  -     POC CBC  -     POC Urinalysis Dipstick  -     HIV-1 / O / 2 Ag / Antibody 4th Generation  -     Angiotensin Converting Enzyme  -     Sedimentation Rate  -     POC Glycosylated Hemoglobin (Hb A1C)    Dyspnea, unspecified type  -     ECG 12 Lead  -     Lipid Panel  -     Breathing Capacity Test  -     XR Chest PA & Lateral; Future    Flank pain  -     Urine Culture - Urine, Urine, Clean Catch

## 2019-07-23 LAB — BACTERIA SPEC AEROBE CULT: NO GROWTH

## 2019-07-24 LAB — ACE SERPL-CCNC: 35 U/L (ref 14–82)

## 2019-07-29 ENCOUNTER — TELEPHONE (OUTPATIENT)
Dept: FAMILY MEDICINE CLINIC | Facility: CLINIC | Age: 49
End: 2019-07-29

## 2019-07-29 DIAGNOSIS — R61 EXCESSIVE SWEATING: Primary | ICD-10-CM

## 2019-08-20 RX ORDER — CLOBETASOL PROPIONATE 0.5 MG/G
OINTMENT TOPICAL 2 TIMES DAILY
Qty: 60 G | Refills: 0 | Status: SHIPPED | OUTPATIENT
Start: 2019-08-20 | End: 2019-11-16 | Stop reason: SDUPTHER

## 2019-09-23 ENCOUNTER — OFFICE VISIT (OUTPATIENT)
Dept: FAMILY MEDICINE CLINIC | Facility: CLINIC | Age: 49
End: 2019-09-23

## 2019-09-23 VITALS
RESPIRATION RATE: 18 BRPM | BODY MASS INDEX: 30.75 KG/M2 | HEIGHT: 73 IN | OXYGEN SATURATION: 98 % | WEIGHT: 232 LBS | SYSTOLIC BLOOD PRESSURE: 110 MMHG | TEMPERATURE: 97.3 F | DIASTOLIC BLOOD PRESSURE: 96 MMHG | HEART RATE: 63 BPM

## 2019-09-23 DIAGNOSIS — R26.89 BALANCE DISORDER: ICD-10-CM

## 2019-09-23 DIAGNOSIS — M25.562 PAIN IN BOTH KNEES, UNSPECIFIED CHRONICITY: ICD-10-CM

## 2019-09-23 DIAGNOSIS — M25.561 PAIN IN BOTH KNEES, UNSPECIFIED CHRONICITY: ICD-10-CM

## 2019-09-23 DIAGNOSIS — J06.9 ACUTE URI: Primary | ICD-10-CM

## 2019-09-23 DIAGNOSIS — R07.81 PLEURITIC CHEST PAIN: ICD-10-CM

## 2019-09-23 DIAGNOSIS — J40 BRONCHITIS: ICD-10-CM

## 2019-09-23 PROCEDURE — 99214 OFFICE O/P EST MOD 30 MIN: CPT | Performed by: FAMILY MEDICINE

## 2019-09-23 RX ORDER — AZITHROMYCIN 250 MG/1
TABLET, FILM COATED ORAL
Qty: 6 TABLET | Refills: 0 | Status: SHIPPED | OUTPATIENT
Start: 2019-09-23 | End: 2019-11-23

## 2019-09-23 RX ORDER — ALBUTEROL SULFATE 90 UG/1
2 AEROSOL, METERED RESPIRATORY (INHALATION) EVERY 6 HOURS PRN
Qty: 1 INHALER | Refills: 2 | Status: SHIPPED | OUTPATIENT
Start: 2019-09-23 | End: 2021-03-03

## 2019-09-23 NOTE — PROGRESS NOTES
Subjective   Tank Tejeda is a 49 y.o. male.   Has had cough and breathing problems for  A few weeks.   URI    This is a new problem. The current episode started 1 to 4 weeks ago. The problem has been unchanged. There has been no fever. Associated symptoms include chest pain, congestion, coughing, headaches, rhinorrhea, sinus pain, sneezing and wheezing. Pertinent negatives include no abdominal pain, diarrhea, dysuria, nausea, rash or sore throat. He has tried nothing for the symptoms.   Pt quit smoking 2 months ago.  Pt interested on PT for balance and vestibular causes.    Pt overall has been feeling better. Anxiety is better. [facility] been [facility].    Pt on feet 12 hr / day for work and is c/o bilat knee pain. Has previous sports injuries to knees in young adulthood.    The following portions of the patient's history were reviewed and updated as appropriate: allergies, current medications, past family history, past medical history, past social history, past surgical history and problem list.  Vitals:    09/23/19 1322   BP: 110/96   Pulse: 63   Resp: 18   Temp: 97.3 °F (36.3 °C)   SpO2: 98%     Body mass index is 30.61 kg/m².  Review of Systems   Constitutional: Negative.  Negative for activity change, fatigue, fever, unexpected weight gain and unexpected weight loss.   HENT: Positive for congestion, rhinorrhea and sneezing. Negative for sore throat.    Eyes: Negative.  Negative for blurred vision, double vision and visual disturbance.   Respiratory: Positive for cough and wheezing. Negative for chest tightness and shortness of breath.    Cardiovascular: Positive for chest pain. Negative for palpitations and leg swelling.   Gastrointestinal: Negative.  Negative for abdominal distention, abdominal pain, blood in stool, constipation, diarrhea and nausea.   Endocrine: Negative.  Negative for cold intolerance and heat intolerance.   Genitourinary: Negative.  Negative for dysuria, frequency, urgency and  urinary incontinence.   Musculoskeletal: Negative.  Negative for arthralgias and myalgias.   Skin: Negative.  Negative for rash.   Allergic/Immunologic: Negative.    Neurological: Negative for dizziness, syncope, numbness and memory problem.   Hematological: Negative.  Negative for adenopathy.   Psychiatric/Behavioral: Negative.  Negative for suicidal ideas and depressed mood. The patient is not nervous/anxious.    All other systems reviewed and are negative.      Objective   Physical Exam   Constitutional: He is oriented to person, place, and time. He appears well-developed and well-nourished. No distress.   HENT:   Right Ear: External ear normal.   Left Ear: External ear normal.   Nose: Nose normal.   Mouth/Throat: Oropharynx is clear and moist.   Eyes: Conjunctivae and EOM are normal. Pupils are equal, round, and reactive to light.   Neck: Normal range of motion. Neck supple. No thyromegaly present.   Cardiovascular: Normal rate, regular rhythm and normal heart sounds.   No murmur heard.  Pulmonary/Chest: Effort normal and breath sounds normal. No respiratory distress. He has no wheezes.   Abdominal: Soft. Bowel sounds are normal. He exhibits no distension and no mass. There is no tenderness. There is no rebound and no guarding. No hernia.   Musculoskeletal: Normal range of motion. He exhibits no edema or tenderness.   Lymphadenopathy:     He has no cervical adenopathy.   Neurological: He is alert and oriented to person, place, and time. He has normal reflexes.   Skin: Skin is warm and dry. No rash noted. He is not diaphoretic. No erythema. No pallor.   Psychiatric: He has a normal mood and affect. His behavior is normal. Judgment and thought content normal.   Nursing note and vitals reviewed.          Assessment/Plan   Tank was seen today for uri.    Diagnoses and all orders for this visit:    Acute URI  -     azithromycin (ZITHROMAX) 250 MG tablet; Take 2 tablets the first day, then 1 tablet daily for 4  days.    Bronchitis  -     albuterol sulfate  (90 Base) MCG/ACT inhaler; Inhale 2 puffs Every 6 (Six) Hours As Needed for Wheezing.    Pleuritic chest pain    Pain in both knees, unspecified chronicity    Balance disorder    Other orders  -     Cancel: Ambulatory Referral to ENT (Otolaryngology)    Ibuprofen over-the-counter for knees and for chest pain/pleurisy.  Recommend physical therapy for balance disorder and knee pain

## 2019-09-28 RX ORDER — VALACYCLOVIR HYDROCHLORIDE 500 MG/1
500 TABLET, FILM COATED ORAL DAILY
Qty: 30 TABLET | Refills: 3 | Status: SHIPPED | OUTPATIENT
Start: 2019-09-28 | End: 2020-06-25 | Stop reason: SDUPTHER

## 2019-11-18 RX ORDER — CLOBETASOL PROPIONATE 0.5 MG/G
OINTMENT TOPICAL 2 TIMES DAILY
Qty: 60 G | Refills: 0 | Status: SHIPPED | OUTPATIENT
Start: 2019-11-18 | End: 2020-03-04 | Stop reason: SDUPTHER

## 2019-11-23 ENCOUNTER — OFFICE VISIT (OUTPATIENT)
Dept: FAMILY MEDICINE CLINIC | Facility: CLINIC | Age: 49
End: 2019-11-23

## 2019-11-23 VITALS
HEIGHT: 73 IN | TEMPERATURE: 97.3 F | OXYGEN SATURATION: 97 % | RESPIRATION RATE: 16 BRPM | BODY MASS INDEX: 31.09 KG/M2 | SYSTOLIC BLOOD PRESSURE: 124 MMHG | HEART RATE: 62 BPM | DIASTOLIC BLOOD PRESSURE: 78 MMHG | WEIGHT: 234.6 LBS

## 2019-11-23 DIAGNOSIS — Z00.00 HEALTH CARE MAINTENANCE: Primary | ICD-10-CM

## 2019-11-23 PROCEDURE — 83036 HEMOGLOBIN GLYCOSYLATED A1C: CPT | Performed by: FAMILY MEDICINE

## 2019-11-23 PROCEDURE — 85025 COMPLETE CBC W/AUTO DIFF WBC: CPT | Performed by: FAMILY MEDICINE

## 2019-11-23 PROCEDURE — 99396 PREV VISIT EST AGE 40-64: CPT | Performed by: FAMILY MEDICINE

## 2019-11-23 PROCEDURE — 80053 COMPREHEN METABOLIC PANEL: CPT | Performed by: FAMILY MEDICINE

## 2019-11-23 PROCEDURE — 80061 LIPID PANEL: CPT | Performed by: FAMILY MEDICINE

## 2019-11-23 NOTE — PROGRESS NOTES
"Patient is here for annual wellness exam.  Needs wellness for work. Works at [employer].    Concerns today none  Continues to follow at  [facility] for balance issues and neurologic testing.    Last health maintenance visit was 1 year . Overall they feel their health is good . Lives with domestic partner and children  Occupation is at [employer]. Patient's diet is in general, an \"unhealthy\" diet. Exercises regularly regularly 5 times weekly walking . Tobacco use Remote history (long-term). Quit 4 months ago, had 1ppd for 30 years. Alcohol use is history of alcohol abuse . Does not drink much now and causes debilitation and unable to drink.  Illicit drug no history of illicit drug use    Reproductive Health  Patient is sexually active and prefers heterosexual partners.       Screening Tests  Vision Impairment. Uses Glasses/Contacts.   Hearing normal  Dental: Brushes does teeth twice a day . Dental exam every six months? yes  Colonoscopy no  Prostate Exam no          Immunization History  Tdap? unknown  HPV? not applicable  Pneumonia? not applicable  Shingles? not applicable    The following portions of the patient's history were reviewed and updated as appropriate: allergies, current medications, past family history, past medical history, past social history, past surgical history and problem list.    Past Medical History:   Diagnosis Date   • Allergic    • Bell's palsy    • Depression    • Seasonal allergies        Family History   Problem Relation Age of Onset   • No Known Problems Mother    • Diabetes Father    • Birth defects Maternal Grandfather        Past Surgical History:   Procedure Laterality Date   • NO PAST SURGERIES         Social History     Socioeconomic History   • Marital status:      Spouse name: Not on file   • Number of children: Not on file   • Years of education: Not on file   • Highest education level: Not on file   Occupational History   • Occupation:  "   Tobacco Use   • Smoking status: Former Smoker     Types: Cigarettes     Last attempt to quit: 7/15/2019     Years since quittin.3   • Smokeless tobacco: Never Used   Substance and Sexual Activity   • Alcohol use: No   • Drug use: No   • Sexual activity: Defer     Vitals:    19 1024   BP: 124/78   Pulse: 62   Resp: 16   Temp: 97.3 °F (36.3 °C)   SpO2: 97%     Body mass index is 30.95 kg/m².    Review of Systems  Do you have pain that bothers you in your daily life? no  Review of Systems   Constitutional: Negative.  Negative for activity change, fatigue, fever and unexpected weight change.   HENT: Negative.  Negative for congestion, sneezing and sore throat.    Eyes: Negative.  Negative for visual disturbance.   Respiratory: Negative.  Negative for cough, chest tightness, shortness of breath and wheezing.    Cardiovascular: Negative.  Negative for chest pain, palpitations and leg swelling.   Gastrointestinal: Negative.  Negative for abdominal distention, abdominal pain, blood in stool, constipation, diarrhea and nausea.   Endocrine: Negative.  Negative for cold intolerance and heat intolerance.   Genitourinary: Negative.  Negative for dysuria, frequency and urgency.   Musculoskeletal: Negative.  Negative for arthralgias and myalgias.   Skin: Negative.  Negative for rash.   Allergic/Immunologic: Negative.    Neurological: Negative.  Negative for dizziness, syncope and numbness.   Hematological: Negative.  Negative for adenopathy.   Psychiatric/Behavioral: Negative.  Negative for suicidal ideas. The patient is not nervous/anxious.        Objective   Physical Exam   Constitutional: He is oriented to person, place, and time. He appears well-developed and well-nourished. No distress.   HENT:   Right Ear: External ear normal.   Left Ear: External ear normal.   Nose: Nose normal.   Mouth/Throat: Oropharynx is clear and moist.   Eyes: Conjunctivae and EOM are normal. Pupils are equal, round, and reactive to light.    Neck: Normal range of motion. Neck supple. No thyromegaly present.   Cardiovascular: Normal rate, regular rhythm and normal heart sounds.   No murmur heard.  Pulmonary/Chest: Effort normal and breath sounds normal. No respiratory distress. He has no wheezes.   Abdominal: Soft. Bowel sounds are normal. He exhibits no distension and no mass. There is no tenderness. There is no rebound and no guarding. No hernia.   Musculoskeletal: Normal range of motion. He exhibits no edema or tenderness.   Lymphadenopathy:     He has no cervical adenopathy.   Neurological: He is alert and oriented to person, place, and time. He has normal reflexes.   Skin: Skin is warm and dry. No rash noted. He is not diaphoretic. No erythema. No pallor.   Psychiatric: He has a normal mood and affect. His behavior is normal. Judgment and thought content normal.   Nursing note and vitals reviewed.       Assessment/Plan   Healthy male exam.      1.   Problem List Items Addressed This Visit     None      Visit Diagnoses     Health care maintenance    -  Primary    Relevant Orders    CBC & Differential    Comprehensive Metabolic Panel    Lipid Panel    Hemoglobin A1c    CBC Auto Differential            2. Patient Counseling:  --Nutrition: Stressed importance of moderation in sodium/caffeine intake, saturated fat and cholesterol, caloric balance, sufficient intake of fresh fruits, vegetables, fiber, calcium   --Discussed the issue of  calcium supplement, and the daily use of baby aspirin.  --Exercise: Stressed the importance of regular exercise.   --Substance Abuse: Discussed cessation/primary prevention of tobacco, alcohol, or other drug use; driving or other dangerous activities under the influence; availability of treatment for abuse.    --Sexuality: Discussed sexually transmitted diseases, partner selection, use of condoms,   and contraceptive alternatives.   --Injury prevention: Discussed safety belts, safety helmets, smoke detector, smoking near  bedding or upholstery.   --Dental health: Discussed importance of regular tooth brushing, flossing, and dental visits.  --Immunizations reviewed.  --Discussed benefits of screening colonoscopy.  --After hours service discussed with patient    3. Discussed the patient's BMI with him.  The BMI is above average; BMI management plan is completed  4. Follow up as needed for acute illness  5. Discussed the nature of the disease including, risks, complications, implications, management, safe and proper use of medications. Encouraged therapeutic lifestyle changes including healthy diet with plenty of fruits and vegetables, daily exercise, medication compliance, and keeping scheduled follow up appointments with me and any other providers. Encouraged patient to have appointment for complete physical, fasting labs, appropriate screenings, and immunizations on an annual basis.

## 2019-11-24 LAB
ALBUMIN SERPL-MCNC: 4.4 G/DL (ref 3.5–5.2)
ALBUMIN/GLOB SERPL: 1.3 G/DL
ALP SERPL-CCNC: 95 U/L (ref 39–117)
ALT SERPL W P-5'-P-CCNC: 40 U/L (ref 1–41)
ANION GAP SERPL CALCULATED.3IONS-SCNC: 12.1 MMOL/L (ref 5–15)
AST SERPL-CCNC: 31 U/L (ref 1–40)
BASOPHILS # BLD AUTO: 0.04 10*3/MM3 (ref 0–0.2)
BASOPHILS NFR BLD AUTO: 0.4 % (ref 0–1.5)
BILIRUB SERPL-MCNC: 0.5 MG/DL (ref 0.2–1.2)
BUN BLD-MCNC: 17 MG/DL (ref 6–20)
BUN/CREAT SERPL: 14.9 (ref 7–25)
CALCIUM SPEC-SCNC: 9.7 MG/DL (ref 8.6–10.5)
CHLORIDE SERPL-SCNC: 98 MMOL/L (ref 98–107)
CHOLEST SERPL-MCNC: 259 MG/DL (ref 0–200)
CO2 SERPL-SCNC: 24.9 MMOL/L (ref 22–29)
CREAT BLD-MCNC: 1.14 MG/DL (ref 0.76–1.27)
DEPRECATED RDW RBC AUTO: 48 FL (ref 37–54)
EOSINOPHIL # BLD AUTO: 0.15 10*3/MM3 (ref 0–0.4)
EOSINOPHIL NFR BLD AUTO: 1.4 % (ref 0.3–6.2)
ERYTHROCYTE [DISTWIDTH] IN BLOOD BY AUTOMATED COUNT: 13.7 % (ref 12.3–15.4)
GFR SERPL CREATININE-BSD FRML MDRD: 83 ML/MIN/1.73
GLOBULIN UR ELPH-MCNC: 3.3 GM/DL
GLUCOSE BLD-MCNC: 76 MG/DL (ref 65–99)
HBA1C MFR BLD: 6.31 % (ref 4.8–5.6)
HCT VFR BLD AUTO: 50.5 % (ref 37.5–51)
HDLC SERPL-MCNC: 36 MG/DL (ref 40–60)
HGB BLD-MCNC: 16.1 G/DL (ref 13–17.7)
IMM GRANULOCYTES # BLD AUTO: 0.04 10*3/MM3 (ref 0–0.05)
IMM GRANULOCYTES NFR BLD AUTO: 0.4 % (ref 0–0.5)
LDLC SERPL CALC-MCNC: 194 MG/DL (ref 0–100)
LDLC/HDLC SERPL: 5.39 {RATIO}
LYMPHOCYTES # BLD AUTO: 2.67 10*3/MM3 (ref 0.7–3.1)
LYMPHOCYTES NFR BLD AUTO: 25.7 % (ref 19.6–45.3)
MCH RBC QN AUTO: 30.1 PG (ref 26.6–33)
MCHC RBC AUTO-ENTMCNC: 31.9 G/DL (ref 31.5–35.7)
MCV RBC AUTO: 94.6 FL (ref 79–97)
MONOCYTES # BLD AUTO: 0.99 10*3/MM3 (ref 0.1–0.9)
MONOCYTES NFR BLD AUTO: 9.5 % (ref 5–12)
NEUTROPHILS # BLD AUTO: 6.5 10*3/MM3 (ref 1.7–7)
NEUTROPHILS NFR BLD AUTO: 62.6 % (ref 42.7–76)
NRBC BLD AUTO-RTO: 0 /100 WBC (ref 0–0.2)
PLATELET # BLD AUTO: 215 10*3/MM3 (ref 140–450)
PMV BLD AUTO: 13 FL (ref 6–12)
POTASSIUM BLD-SCNC: 4.2 MMOL/L (ref 3.5–5.2)
PROT SERPL-MCNC: 7.7 G/DL (ref 6–8.5)
RBC # BLD AUTO: 5.34 10*6/MM3 (ref 4.14–5.8)
SODIUM BLD-SCNC: 135 MMOL/L (ref 136–145)
TRIGL SERPL-MCNC: 144 MG/DL (ref 0–150)
VLDLC SERPL-MCNC: 28.8 MG/DL (ref 5–40)
WBC NRBC COR # BLD: 10.39 10*3/MM3 (ref 3.4–10.8)

## 2019-12-04 ENCOUNTER — HOSPITAL ENCOUNTER (EMERGENCY)
Facility: HOSPITAL | Age: 49
Discharge: HOME | End: 2019-12-04
Attending: EMERGENCY MEDICINE | Admitting: EMERGENCY MEDICINE

## 2019-12-04 VITALS
BODY MASS INDEX: 31.14 KG/M2 | RESPIRATION RATE: 18 BRPM | TEMPERATURE: 98.5 F | WEIGHT: 235 LBS | SYSTOLIC BLOOD PRESSURE: 131 MMHG | HEIGHT: 73 IN | HEART RATE: 72 BPM | DIASTOLIC BLOOD PRESSURE: 88 MMHG | OXYGEN SATURATION: 96 %

## 2019-12-04 DIAGNOSIS — H60.502 ACUTE OTITIS EXTERNA OF LEFT EAR, UNSPECIFIED TYPE: Primary | ICD-10-CM

## 2019-12-04 PROCEDURE — 99282 EMERGENCY DEPT VISIT SF MDM: CPT

## 2019-12-04 NOTE — ED PROVIDER NOTES
Subjective   Mr. Tejeda 49-year-old male that presents to the emergency department today with left ear pain.  Patient reports he is unable to get his hearing protection into his ear due to the pain.  He had little bit of a discharge.  Kang patient works at a [employer] company and states that he has to wear ear protection chronically.  He had no hearing loss.  He has chronic vestibular problems and states he had seen ENT years ago.  He had no direct trauma or barotrauma to the ear.  Patient had no fevers no chills no upper respiratory symptoms no other complaints.  He denies hearing loss      History provided by:  Patient   used: No    Earache   Location:  Left  Behind ear:  No abnormality  Quality:  Sore  Severity:  Severe  Onset quality:  Gradual  Timing:  Constant  Progression:  Worsening  Chronicity:  New  Context: not direct blow, not foreign body in ear and not loud noise    Context comment:  Chronically wears hearing protection in the external canal due to employment.  Relieved by:  Nothing  Worsened by:  Palpation  Ineffective treatments:  None tried  Associated symptoms: ear discharge    Associated symptoms: no abdominal pain, no cough, no headaches, no hearing loss, no neck pain, no rash, no rhinorrhea, no sore throat, no tinnitus and no vomiting        Review of Systems   HENT: Positive for ear discharge and ear pain. Negative for hearing loss, rhinorrhea, sore throat and tinnitus.    Respiratory: Negative for cough.    Gastrointestinal: Negative for abdominal pain and vomiting.   Musculoskeletal: Negative for neck pain.   Skin: Negative for rash.   Neurological: Negative for headaches.   Hematological: Negative for adenopathy.   Psychiatric/Behavioral: Negative.        Past Medical History:   Diagnosis Date   • Allergic    • Bell's palsy    • Depression    • Seasonal allergies        Allergies   Allergen Reactions   • Trazodone Anaphylaxis   • Wellbutrin [Bupropion] Other (See  Comments)     Panic attacks         Past Surgical History:   Procedure Laterality Date   • NO PAST SURGERIES         Family History   Problem Relation Age of Onset   • No Known Problems Mother    • Diabetes Father    • Birth defects Maternal Grandfather        Social History     Socioeconomic History   • Marital status:      Spouse name: Not on file   • Number of children: Not on file   • Years of education: Not on file   • Highest education level: Not on file   Occupational History   • Occupation:    Tobacco Use   • Smoking status: Former Smoker     Types: Cigarettes     Last attempt to quit: 7/15/2019     Years since quittin.3   • Smokeless tobacco: Never Used   Substance and Sexual Activity   • Alcohol use: No   • Drug use: No   • Sexual activity: Defer           Objective   Physical Exam   Constitutional: He is oriented to person, place, and time. He appears well-developed and well-nourished.   HENT:   Head: Normocephalic and atraumatic.   Right Ear: External ear normal.   Nose: Nose normal.   Mouth/Throat: Oropharynx is clear and moist.   External canal was erythematous had discharge and was erythematous as well tympanic membrane was intact no perforation no redness or induration of the tympanic membrane itself   Eyes: Conjunctivae are normal. Right eye exhibits no discharge. Left eye exhibits no discharge. No scleral icterus.   Neck: Normal range of motion. No thyromegaly present.   Musculoskeletal: Normal range of motion.   Neurological: He is alert and oriented to person, place, and time.   Skin: Skin is warm. Capillary refill takes less than 2 seconds.   Psychiatric: He has a normal mood and affect.   Nursing note and vitals reviewed.      Procedures           ED Course  ED Course as of Dec 07 2101   Wed Dec 04, 2019   1122 Kate the findings with the patient.  Advised that we could put a ear wick in however he states that he cannot miss work at this time of the year and states  that he would be unable to wear the ear wick with his ear protection since he has to go down into his ear he does not have other options at work apparently    [SIM]      ED Course User Index  [SIM] Nitesh Bateman PA                  [facility]      Final diagnoses:   Acute otitis externa of left ear, unspecified type              Nitesh Bateman PA  12/07/19 0835

## 2019-12-04 NOTE — DISCHARGE INSTRUCTIONS
Try to use ear protection that goes outside the external canal.  Use medications as written return for problems

## 2020-03-05 RX ORDER — CLOBETASOL PROPIONATE 0.5 MG/G
OINTMENT TOPICAL 2 TIMES DAILY
Qty: 60 G | Refills: 0 | Status: SHIPPED | OUTPATIENT
Start: 2020-03-05 | End: 2020-05-09 | Stop reason: SDUPTHER

## 2020-04-17 ENCOUNTER — NURSE TRIAGE (OUTPATIENT)
Dept: CALL CENTER | Facility: HOSPITAL | Age: 50
End: 2020-04-17

## 2020-04-17 NOTE — TELEPHONE ENCOUNTER
"    Reason for Disposition  • [1] Fever (or feeling feverish) OR cough AND [2] within 14 Days of COVID-19 EXPOSURE (Close Contact)    Additional Information  • Negative: SEVERE difficulty breathing (e.g., struggling for each breath, speak in single words, bluish lips)  • Negative: Sounds like a life-threatening emergency to the triager  • Negative: [1] Adult has symptoms of COVID-19 (fever, cough, or SOB) AND [2] lab test positive  • Negative: [1] Adult has symptoms of COVID-19 (fever, cough or SOB) AND [2] major community spread where patient lives AND [3] testing not being done for mild symptoms  • Negative: [1] Difficulty breathing (shortness of breath) occurs AND [2] onset > 14 days after COVID-19 EXPOSURE (Close Contact) AND [3] no major community spread  • Negative: [1] Dry cough occurs AND [2] onset > 14 days after COVID-19 EXPOSURE AND [3] no major community spread  • Negative: [1] Wet cough (i.e., white-yellow, yellow, green, or rafaela colored sputum) AND [2] onset > 14 days after COVID-19 EXPOSURE AND [3] no major community spread  • Negative: [1] Common cold symptoms AND [2] onset > 14 days after COVID-19 EXPOSURE AND [3] no major community spread  • Negative: [1] Difficulty breathing occurs AND [2] within 14 days of COVID-19 EXPOSURE (Close Contact)  • Negative: Patient sounds very sick or weak to the triager    Answer Assessment - Initial Assessment Questions  1. CLOSE CONTACT: \"Who is the person with the confirmed or suspected COVID-19 infection that you were exposed to?\"      Pt has no known exposure to COVID-19. He works at [employer] in [place]. Pt has been to [employer] in [facility]. He wears a mask and gloves. He's had a visitor in his home last week. Pt has not been visiting others in their homes in the last 3 weeks. He lives with his wife and daughter.       2. PLACE of CONTACT: \"Where were you when you were exposed to COVID-19?\" (e.g., home, school, medical waiting room; which " "[facility]?)      [facility]      3. TYPE of CONTACT: \"How much contact was there?\" (e.g., sitting next to, live in same house, work in same office, same building)      In the community and at work      4. DURATION of CONTACT: \"How long were you in contact with the COVID-19 patient?\" (e.g., a few seconds, passed by person, a few minutes, live with the patient)      Minutes up to hours      5. DATE of CONTACT: \"When did you have contact with a COVID-19 patient?\" (e.g., how many days ago)      Last day at work for patient was 4/14/20. Daughter visited him in his home last week.      6. TRAVEL: \"Have you traveled out of the country recently?\" If so, \"When and where?\"      Pt has not traveled outside of [facility] in the last 3 weeks      7. COMMUNITY SPREAD: \"Are there lots of cases or COVID-19 (community spread) where you live?\" (See public health department website, if unsure)       Widespread      8. SYMPTOMS: \"Do you have any symptoms?\" (e.g., fever, cough, breathing difficulty)      Fever-no      Cough-dry. Cough started a couple days but it's getting worse as of today. He has seasonal allergies.      Breathing difficulty-labored breathing when he lays down      Other symptoms-sore throat, congestion, and upset stomach      9. PREGNANCY OR POSTPARTUM: \"Is there any chance you are pregnant?\" \"When was your last menstrual period?\" \"Did you deliver in the last 2 weeks?\"      Male patient      10. HIGH RISK: \"Do you have any heart or lung problems? Do you have a weak immune system?\" (e.g., CHF, COPD, asthma, HIV positive, chemotherapy, renal failure, diabetes mellitus, sickle cell anemia)        Pt denies having heart and lung problems. He's a former smoker. He denies having a weakened immune system.    Protocols used: CORONAVIRUS (COVID-19) EXPOSURE-ADULT-AH      "

## 2020-05-11 RX ORDER — CLOBETASOL PROPIONATE 0.5 MG/G
OINTMENT TOPICAL 2 TIMES DAILY
Qty: 60 G | Refills: 0 | Status: SHIPPED | OUTPATIENT
Start: 2020-05-11 | End: 2020-06-25 | Stop reason: SDUPTHER

## 2020-06-26 RX ORDER — VALACYCLOVIR HYDROCHLORIDE 500 MG/1
500 TABLET, FILM COATED ORAL DAILY
Qty: 30 TABLET | Refills: 3 | Status: SHIPPED | OUTPATIENT
Start: 2020-06-26

## 2020-06-26 RX ORDER — CLOBETASOL PROPIONATE 0.5 MG/G
OINTMENT TOPICAL 2 TIMES DAILY
Qty: 60 G | Refills: 0 | Status: SHIPPED | OUTPATIENT
Start: 2020-06-26 | End: 2020-10-20 | Stop reason: SDUPTHER

## 2020-10-21 RX ORDER — CLOBETASOL PROPIONATE 0.5 MG/G
OINTMENT TOPICAL 2 TIMES DAILY
Qty: 60 G | Refills: 0 | Status: SHIPPED | OUTPATIENT
Start: 2020-10-21 | End: 2021-01-03 | Stop reason: SDUPTHER

## 2021-01-04 RX ORDER — CLOBETASOL PROPIONATE 0.5 MG/G
OINTMENT TOPICAL 2 TIMES DAILY
Qty: 60 G | Refills: 0 | Status: SHIPPED | OUTPATIENT
Start: 2021-01-04 | End: 2021-06-20 | Stop reason: SDUPTHER

## 2021-03-03 ENCOUNTER — OFFICE VISIT (OUTPATIENT)
Dept: FAMILY MEDICINE CLINIC | Facility: CLINIC | Age: 51
End: 2021-03-03

## 2021-03-03 VITALS
BODY MASS INDEX: 30.66 KG/M2 | HEIGHT: 74 IN | HEART RATE: 62 BPM | RESPIRATION RATE: 20 BRPM | OXYGEN SATURATION: 98 % | TEMPERATURE: 98 F | SYSTOLIC BLOOD PRESSURE: 110 MMHG | DIASTOLIC BLOOD PRESSURE: 80 MMHG | WEIGHT: 238.9 LBS

## 2021-03-03 DIAGNOSIS — M25.562 PAIN IN BOTH KNEES, UNSPECIFIED CHRONICITY: Primary | ICD-10-CM

## 2021-03-03 DIAGNOSIS — M25.561 PAIN IN BOTH KNEES, UNSPECIFIED CHRONICITY: Primary | ICD-10-CM

## 2021-03-03 PROBLEM — R42 DIZZINESS: Status: RESOLVED | Noted: 2019-02-12 | Resolved: 2021-03-03

## 2021-03-03 LAB
ALBUMIN SERPL-MCNC: 3.8 G/DL (ref 3.5–5.2)
ALBUMIN/GLOB SERPL: 1.4 G/DL
ALP SERPL-CCNC: 67 U/L (ref 39–117)
ALT SERPL W P-5'-P-CCNC: 26 U/L (ref 1–41)
ANION GAP SERPL CALCULATED.3IONS-SCNC: 7.5 MMOL/L (ref 5–15)
AST SERPL-CCNC: 22 U/L (ref 1–40)
BASOPHILS # BLD AUTO: 0.05 10*3/MM3 (ref 0–0.2)
BASOPHILS NFR BLD AUTO: 0.6 % (ref 0–1.5)
BILIRUB SERPL-MCNC: 0.5 MG/DL (ref 0–1.2)
BUN SERPL-MCNC: 29 MG/DL (ref 6–20)
BUN/CREAT SERPL: 22.8 (ref 7–25)
CALCIUM SPEC-SCNC: 9 MG/DL (ref 8.6–10.5)
CHLORIDE SERPL-SCNC: 105 MMOL/L (ref 98–107)
CHOLEST SERPL-MCNC: 205 MG/DL (ref 0–200)
CO2 SERPL-SCNC: 27.5 MMOL/L (ref 22–29)
CREAT SERPL-MCNC: 1.27 MG/DL (ref 0.76–1.27)
DEPRECATED RDW RBC AUTO: 43.6 FL (ref 37–54)
EOSINOPHIL # BLD AUTO: 0.18 10*3/MM3 (ref 0–0.4)
EOSINOPHIL NFR BLD AUTO: 2 % (ref 0.3–6.2)
ERYTHROCYTE [DISTWIDTH] IN BLOOD BY AUTOMATED COUNT: 13 % (ref 12.3–15.4)
ERYTHROCYTE [SEDIMENTATION RATE] IN BLOOD: 10 MM/HR (ref 0–15)
GFR SERPL CREATININE-BSD FRML MDRD: 73 ML/MIN/1.73
GLOBULIN UR ELPH-MCNC: 2.7 GM/DL
GLUCOSE SERPL-MCNC: 81 MG/DL (ref 65–99)
HCT VFR BLD AUTO: 50.7 % (ref 37.5–51)
HDLC SERPL-MCNC: 35 MG/DL (ref 40–60)
HGB BLD-MCNC: 16.7 G/DL (ref 13–17.7)
IMM GRANULOCYTES # BLD AUTO: 0.06 10*3/MM3 (ref 0–0.05)
IMM GRANULOCYTES NFR BLD AUTO: 0.7 % (ref 0–0.5)
LDLC SERPL CALC-MCNC: 151 MG/DL (ref 0–100)
LDLC/HDLC SERPL: 4.25 {RATIO}
LYMPHOCYTES # BLD AUTO: 2.63 10*3/MM3 (ref 0.7–3.1)
LYMPHOCYTES NFR BLD AUTO: 29.1 % (ref 19.6–45.3)
MCH RBC QN AUTO: 30.2 PG (ref 26.6–33)
MCHC RBC AUTO-ENTMCNC: 32.9 G/DL (ref 31.5–35.7)
MCV RBC AUTO: 91.7 FL (ref 79–97)
MONOCYTES # BLD AUTO: 0.76 10*3/MM3 (ref 0.1–0.9)
MONOCYTES NFR BLD AUTO: 8.4 % (ref 5–12)
NEUTROPHILS NFR BLD AUTO: 5.36 10*3/MM3 (ref 1.7–7)
NEUTROPHILS NFR BLD AUTO: 59.2 % (ref 42.7–76)
NRBC BLD AUTO-RTO: 0 /100 WBC (ref 0–0.2)
PLATELET # BLD AUTO: 175 10*3/MM3 (ref 140–450)
PMV BLD AUTO: 12.3 FL (ref 6–12)
POTASSIUM SERPL-SCNC: 4.7 MMOL/L (ref 3.5–5.2)
PROT SERPL-MCNC: 6.5 G/DL (ref 6–8.5)
RBC # BLD AUTO: 5.53 10*6/MM3 (ref 4.14–5.8)
SODIUM SERPL-SCNC: 140 MMOL/L (ref 136–145)
TRIGL SERPL-MCNC: 106 MG/DL (ref 0–150)
VLDLC SERPL-MCNC: 19 MG/DL (ref 5–40)
WBC # BLD AUTO: 9.04 10*3/MM3 (ref 3.4–10.8)

## 2021-03-03 PROCEDURE — 85652 RBC SED RATE AUTOMATED: CPT | Performed by: FAMILY MEDICINE

## 2021-03-03 PROCEDURE — 86038 ANTINUCLEAR ANTIBODIES: CPT | Performed by: FAMILY MEDICINE

## 2021-03-03 PROCEDURE — 86431 RHEUMATOID FACTOR QUANT: CPT | Performed by: FAMILY MEDICINE

## 2021-03-03 PROCEDURE — 84443 ASSAY THYROID STIM HORMONE: CPT | Performed by: FAMILY MEDICINE

## 2021-03-03 PROCEDURE — 85025 COMPLETE CBC W/AUTO DIFF WBC: CPT | Performed by: FAMILY MEDICINE

## 2021-03-03 PROCEDURE — 80061 LIPID PANEL: CPT | Performed by: FAMILY MEDICINE

## 2021-03-03 PROCEDURE — 80053 COMPREHEN METABOLIC PANEL: CPT | Performed by: FAMILY MEDICINE

## 2021-03-03 PROCEDURE — 99213 OFFICE O/P EST LOW 20 MIN: CPT | Performed by: FAMILY MEDICINE

## 2021-03-03 RX ORDER — DICLOFENAC SODIUM 75 MG/1
75 TABLET, DELAYED RELEASE ORAL 2 TIMES DAILY
Qty: 40 TABLET | Refills: 0 | Status: SHIPPED | OUTPATIENT
Start: 2021-03-03 | End: 2021-03-03

## 2021-03-03 NOTE — PROGRESS NOTES
"Chief Complaint  Knee Pain (both knee pain)    Subjective          Tank Tejeda presents to [facility]  History of Present Illness   He is complaining of bilateral knee pain. He has a history of knee issues. He first started having knee pain several years ago and it has been increasing for the past several months. He states the pain is constant throughout the day. He is not taking any OTC medications for the pain. The pain increases with movement. He was told he had fluid on his right knee and arthritis 20 years ago, but never followed up and the pain resolved. He would like to know his treatment options.   Works 12 shifts on feet a lot which contributes to knee pain. Also worse with exercise and working out.  Review of Systems   Constitutional: Negative.    HENT: Negative.    Eyes: Negative.    Respiratory: Negative.    Cardiovascular: Negative.    Gastrointestinal: Negative.    Endocrine: Negative.    Genitourinary: Negative.    Musculoskeletal: Positive for arthralgias.   Skin: Negative.    Allergic/Immunologic: Negative.    Neurological: Negative.    Hematological: Negative.    Psychiatric/Behavioral: Negative.      Objective   Vital Signs:   /80   Pulse 62   Temp 98 °F (36.7 °C) (Temporal)   Resp 20   Ht 186.7 cm (73.5\")   Wt 108 kg (238 lb 14.4 oz)   SpO2 98%   BMI 31.09 kg/m²     Physical Exam  Vitals signs and nursing note reviewed.   Constitutional:       General: He is not in acute distress.     Appearance: He is well-developed. He is not diaphoretic.   HENT:      Right Ear: External ear normal.      Left Ear: External ear normal.      Nose: Nose normal.   Eyes:      Conjunctiva/sclera: Conjunctivae normal.      Pupils: Pupils are equal, round, and reactive to light.   Neck:      Musculoskeletal: Normal range of motion and neck supple.      Thyroid: No thyromegaly.   Cardiovascular:      Rate and Rhythm: Normal rate and regular rhythm.      Heart sounds: " Normal heart sounds. No murmur.   Pulmonary:      Effort: Pulmonary effort is normal. No respiratory distress.      Breath sounds: Normal breath sounds. No wheezing.   Abdominal:      General: Bowel sounds are normal. There is no distension.      Palpations: Abdomen is soft. There is no mass.      Tenderness: There is no abdominal tenderness. There is no guarding or rebound.      Hernia: No hernia is present.   Musculoskeletal: Normal range of motion.      Right knee: He exhibits abnormal patellar mobility. Tenderness found.      Left knee: He exhibits abnormal patellar mobility. Tenderness found.   Lymphadenopathy:      Cervical: No cervical adenopathy.   Skin:     General: Skin is warm and dry.      Coloration: Skin is not pale.      Findings: No erythema or rash.   Neurological:      Mental Status: He is alert and oriented to person, place, and time.      Deep Tendon Reflexes: Reflexes are normal and symmetric.   Psychiatric:         Behavior: Behavior normal.         Thought Content: Thought content normal.         Judgment: Judgment normal.        Result Review :                 Assessment and Plan    Diagnoses and all orders for this visit:    1. Pain in both knees, unspecified chronicity (Primary)  -     Ambulatory Referral to Orthopedic Surgery  -     CBC & Differential  -     Comprehensive Metabolic Panel  -     Lipid Panel  -     TSH  -     DIONE  -     Rheumatoid Factor  -     Sedimentation Rate  -     CBC Auto Differential    Other orders  -     Discontinue: diclofenac (VOLTAREN) 75 MG EC tablet; Take 1 tablet by mouth 2 (Two) Times a Day.  Dispense: 40 tablet; Refill: 0    Pt defers NSAID at this time.      Follow Up   No follow-ups on file.  Patient was given instructions and counseling regarding his condition or for health maintenance advice. Please see specific information pulled into the AVS if appropriate.

## 2021-03-04 ENCOUNTER — OFFICE VISIT (OUTPATIENT)
Dept: ORTHOPEDIC SURGERY | Facility: CLINIC | Age: 51
End: 2021-03-04

## 2021-03-04 VITALS — OXYGEN SATURATION: 99 % | WEIGHT: 238 LBS | BODY MASS INDEX: 30.54 KG/M2 | HEART RATE: 71 BPM | HEIGHT: 74 IN

## 2021-03-04 DIAGNOSIS — M25.561 CHRONIC PAIN OF BOTH KNEES: Primary | ICD-10-CM

## 2021-03-04 DIAGNOSIS — M25.562 CHRONIC PAIN OF BOTH KNEES: Primary | ICD-10-CM

## 2021-03-04 DIAGNOSIS — M17.0 PRIMARY OSTEOARTHRITIS OF BOTH KNEES: ICD-10-CM

## 2021-03-04 DIAGNOSIS — G89.29 CHRONIC PAIN OF BOTH KNEES: Primary | ICD-10-CM

## 2021-03-04 LAB
CHROMATIN AB SERPL-ACNC: <10 IU/ML (ref 0–14)
TSH SERPL DL<=0.05 MIU/L-ACNC: 1.11 UIU/ML (ref 0.27–4.2)

## 2021-03-04 PROCEDURE — 99204 OFFICE O/P NEW MOD 45 MIN: CPT | Performed by: PHYSICIAN ASSISTANT

## 2021-03-04 NOTE — PROGRESS NOTES
[facility] [place] [facility] Note    Subjective     Chief Complaint   Patient presents with   • Left Knee - Pain   • Right Knee - Pain        HPI  Tank Tejeda is a 50 y.o. male.  Patient presents for evaluation of bilateral knee pain.  He reports ongoing pain for years.  He has been told in the past that he has internal derangement with torn cartilage.  No recent history of injury or trauma.      Patient endorses a pain scale of 6/10.  Severity the pain moderate.  Quality the pain dull, aching, throbbing.  Associated symptoms swelling, popping, grinding, stiffness.  Symptoms worse with walking, standing, stair climbing and work.  No reported numbness or tingling.  No mechanical symptoms such as locking or catching to the knees.    Patient denies any fever, chills, night sweats or other constitutional symptoms.    Past Medical History:   Diagnosis Date   • Allergic    • Bell's palsy    • Depression    • Seasonal allergies       Past Surgical History:   Procedure Laterality Date   • CYST REMOVAL Right    • NO PAST SURGERIES        Family History   Problem Relation Age of Onset   • No Known Problems Mother    • Diabetes Father    • Birth defects Maternal Grandfather      Social History     Socioeconomic History   • Marital status:      Spouse name: Not on file   • Number of children: Not on file   • Years of education: Not on file   • Highest education level: Not on file   Tobacco Use   • Smoking status: Former Smoker     Types: Cigarettes     Quit date: 7/15/2019     Years since quittin.6   • Smokeless tobacco: Never Used   Substance and Sexual Activity   • Alcohol use: No   • Drug use: No   • Sexual activity: Defer      Current Outpatient Medications on File Prior to Visit   Medication Sig Dispense Refill   • clobetasol (TEMOVATE) 0.05 % ointment Apply  topically to the appropriate area as directed 2 (Two) Times a Day. 60 g 0   • fluticasone (FLONASE) 50 MCG/ACT nasal spray fluticasone propionate  "50 mcg/actuation nasal spray,suspension     • valACYclovir (Valtrex) 500 MG tablet Take 1 tablet by mouth Daily. 30 tablet 3     No current facility-administered medications on file prior to visit.      Allergies   Allergen Reactions   • Trazodone Anaphylaxis   • Wellbutrin [Bupropion] Other (See Comments)     Panic attacks          The following portions of the patient's history were reviewed and updated as appropriate: allergies, current medications, past family history, past medical history, past social history, past surgical history and problem list.    Review of Systems   Constitutional: Negative.    HENT: Negative.    Eyes: Negative.    Respiratory: Negative.    Cardiovascular: Positive for leg swelling.   Gastrointestinal: Negative.    Endocrine: Negative.    Genitourinary: Negative.    Musculoskeletal: Positive for arthralgias and joint swelling.   Skin: Negative.    Allergic/Immunologic: Positive for environmental allergies.   Neurological: Negative.    Hematological: Negative.    Psychiatric/Behavioral: Negative.         Objective      Physical Exam  Pulse 71   Ht 186.7 cm (73.5\")   Wt 108 kg (238 lb)   SpO2 99%   BMI 30.97 kg/m²     Body mass index is 30.97 kg/m².    GENERAL APPEARANCE: awake, alert & oriented x 3, in no acute distress and well developed, well nourished  PSYCH: normal mood and affect  LUNGS:  breathing nonlabored, no wheezing  EYES: sclera anicteric, pupils equal  CARDIOVASCULAR: palpable pulses. Capillary refill less than 2 seconds  INTEGUMENTARY: skin intact, no clubbing, cyanosis  NEUROLOGIC:  Normal Sensation         Ortho Exam  Peripheral Vascular:    Upper Extremity:   Inspection:  Left--no cyanotic nail beds Right--no cyanotic nail beds   Bilateral:  Pink nail beds with brisk capillary refill   Palpation:  Bilateral radial pulse normal    Musculoskeletal:  Global Assessment:  Overall assessment of Lower Extremity Muscle Strength and Tone:  Left quadriceps--5/5   Left " hamstrings--5/5       Left tibialis anterior--5/5  Left gastroc-soleus--5/5  Left EHL--5/5    Right quadriceps--5/5  Right hamstrings--5/5  Right tibialis anterior--5/5  Right gastroc soleus--5/5  Right EHL--5/5    Lower Extremity:  Knee/Patella:  No digital clubbing or cyanosis.    Examination of left and right knees reveals:  Normal deep tendon reflexes, coordination, strength, tone, sensation.  No known fractures or deformities.    Inspection and Palpation:    Bilateral knee:  Tenderness: Positive tenderness noted earnest-/retropatellar as well as medial greater than lateral joint lines.  Effusion: None  Crepitus: Positive  Pulses:  2+  Ecchymosis:  None  Warmth:  None     ROM:  Right:  Extension:0    Flexion:120  Left:  Extension:0     Flexion:120    Instability:    Bilateral:  Lachman Test:  Negative, Varus stress test negative, Valgus stress test negative    Deformities/Malalignments/Discrepancies:    Bilateral: Slight genu varum    Functional Testing:  Bilateral:  Jeffrey's test:  Negative  Patella grind test:  Negative  Q-angle:  Normal  Apprehension Sign:  Negative  Bilateral patella alto      Imaging/Studies  Ordered bilateral knees plain films.  Imaging read by Dr. Franks.    Imaging Results (Last 7 Days)     Procedure Component Value Units Date/Time    XR Knee 4+ View Bilateral [[id number]] Resulted: 03/04/21 0849     Updated: 03/04/21 0850    Narrative:      Indication: Bilateral knee pain    Comparison: No prior xrays are available for comparison      Impression:           Right Knee: mild tricompartmental osteoarthritis with no significant joint   space narrowing.  Small periarticular osteophytes are visualized primarily   in the medial compartment.  No acute bony injury or fracture.  Left Knee: mild tricompartmental osteoarthritis with no significant joint   space narrowing.  Small periarticular osteophytes are visualized primarily   in the medial compartment.  No acute bony injury or fracture.         Laboratory data  3/3/2021: ESR 10, RF less than 10, DIONE negative, WBC 9.04, CMP with BUN 29 and creatinine 1.27.  Assessment/Plan        ICD-10-CM ICD-9-CM   1. Chronic pain of both knees  M25.561 719.46    M25.562 338.29    G89.29    2. Primary osteoarthritis of both knees  M17.0 715.16       Orders Placed This Encounter   Procedures   • XR Knee 4+ View Bilateral   • Ambulatory Referral to Physical Therapy Evaluate and treat, Ortho        -Chronic pain due to bilateral knee osteoarthritis.  -Because of elevated BUN patient was provided with Voltaren gel.  -He was referred to formal PT (Cardinal Griffin).  -We discussed possible corticosteroid versus viscosupplementation series as a future adjunctive treatment option.  -Follow-up 4 weeks for repeat evaluation, sooner if issues arise or symptoms worsen/change.  -Questions and concerns answered.    History, exam and imaging all discussed with Dr. Franks who agrees with the above assessment and plan.      Medical Decision Making  Management Options : prescription/IM medicine and physical/occupational therapy  Data/Risk: radiology tests       Suri Mena PA-C  03/08/21  10:05 EST         EMR Dragon/Transcription disclaimer:  Much of this encounter note is an electronic transcription of spoken language to printed text. Electronic transcription of spoken language may permit erroneous, or at times, nonsensical words or phrases to be inadvertently transcribed. Although I have reviewed the note for such errors, some may still exist.

## 2021-03-05 LAB — ANA SER QL: NEGATIVE

## 2021-03-10 ENCOUNTER — TELEPHONE (OUTPATIENT)
Dept: ORTHOPEDIC SURGERY | Facility: CLINIC | Age: 51
End: 2021-03-10

## 2021-03-10 NOTE — TELEPHONE ENCOUNTER
I left a voice message letting patient know that his Diclofenac gel script was not approved by his insurance. I let him know that he could buy this over the counter and I gave him the instructions Suri suggested for application.     medical

## 2021-06-08 PROCEDURE — U0004 COV-19 TEST NON-CDC HGH THRU: HCPCS | Performed by: NURSE PRACTITIONER

## 2021-06-21 RX ORDER — CLOBETASOL PROPIONATE 0.5 MG/G
OINTMENT TOPICAL 2 TIMES DAILY
Qty: 60 G | Refills: 0 | Status: SHIPPED | OUTPATIENT
Start: 2021-06-21 | End: 2023-03-24

## 2021-11-16 ENCOUNTER — APPOINTMENT (OUTPATIENT)
Dept: CT IMAGING | Facility: HOSPITAL | Age: 51
End: 2021-11-16

## 2021-11-16 ENCOUNTER — HOSPITAL ENCOUNTER (EMERGENCY)
Facility: HOSPITAL | Age: 51
Discharge: HOME | End: 2021-11-16
Attending: EMERGENCY MEDICINE | Admitting: EMERGENCY MEDICINE

## 2021-11-16 VITALS
DIASTOLIC BLOOD PRESSURE: 107 MMHG | OXYGEN SATURATION: 100 % | SYSTOLIC BLOOD PRESSURE: 147 MMHG | HEART RATE: 60 BPM | RESPIRATION RATE: 14 BRPM | BODY MASS INDEX: 33.4 KG/M2 | WEIGHT: 252 LBS | HEIGHT: 73 IN | TEMPERATURE: 98.1 F

## 2021-11-16 DIAGNOSIS — R09.89 LABILE BLOOD PRESSURE: Primary | ICD-10-CM

## 2021-11-16 LAB
ALBUMIN SERPL-MCNC: 4.1 G/DL (ref 3.5–5.2)
ALBUMIN/GLOB SERPL: 1.5 G/DL
ALP SERPL-CCNC: 83 U/L (ref 39–117)
ALT SERPL W P-5'-P-CCNC: 34 U/L (ref 1–41)
ANION GAP SERPL CALCULATED.3IONS-SCNC: 10 MMOL/L (ref 5–15)
AST SERPL-CCNC: 24 U/L (ref 1–40)
BACTERIA UR QL AUTO: NORMAL /HPF
BASOPHILS # BLD AUTO: 0.03 10*3/MM3 (ref 0–0.2)
BASOPHILS NFR BLD AUTO: 0.5 % (ref 0–1.5)
BILIRUB SERPL-MCNC: 0.5 MG/DL (ref 0–1.2)
BILIRUB UR QL STRIP: NEGATIVE
BUN SERPL-MCNC: 9 MG/DL (ref 6–20)
BUN/CREAT SERPL: 8.6 (ref 7–25)
CALCIUM SPEC-SCNC: 9.1 MG/DL (ref 8.6–10.5)
CHLORIDE SERPL-SCNC: 105 MMOL/L (ref 98–107)
CLARITY UR: ABNORMAL
CO2 SERPL-SCNC: 23 MMOL/L (ref 22–29)
COLOR UR: YELLOW
CREAT SERPL-MCNC: 1.05 MG/DL (ref 0.76–1.27)
DEPRECATED RDW RBC AUTO: 44.5 FL (ref 37–54)
EOSINOPHIL # BLD AUTO: 0.08 10*3/MM3 (ref 0–0.4)
EOSINOPHIL NFR BLD AUTO: 1.2 % (ref 0.3–6.2)
ERYTHROCYTE [DISTWIDTH] IN BLOOD BY AUTOMATED COUNT: 12.8 % (ref 12.3–15.4)
GFR SERPL CREATININE-BSD FRML MDRD: 91 ML/MIN/1.73
GLOBULIN UR ELPH-MCNC: 2.8 GM/DL
GLUCOSE SERPL-MCNC: 117 MG/DL (ref 65–99)
GLUCOSE UR STRIP-MCNC: NEGATIVE MG/DL
HCT VFR BLD AUTO: 49 % (ref 37.5–51)
HGB BLD-MCNC: 16 G/DL (ref 13–17.7)
HGB UR QL STRIP.AUTO: NEGATIVE
HOLD SPECIMEN: NORMAL
HYALINE CASTS UR QL AUTO: NORMAL /LPF
IMM GRANULOCYTES # BLD AUTO: 0.02 10*3/MM3 (ref 0–0.05)
IMM GRANULOCYTES NFR BLD AUTO: 0.3 % (ref 0–0.5)
KETONES UR QL STRIP: NEGATIVE
LEUKOCYTE ESTERASE UR QL STRIP.AUTO: NEGATIVE
LIPASE SERPL-CCNC: 35 U/L (ref 13–60)
LYMPHOCYTES # BLD AUTO: 1.95 10*3/MM3 (ref 0.7–3.1)
LYMPHOCYTES NFR BLD AUTO: 29.3 % (ref 19.6–45.3)
MCH RBC QN AUTO: 30.7 PG (ref 26.6–33)
MCHC RBC AUTO-ENTMCNC: 32.7 G/DL (ref 31.5–35.7)
MCV RBC AUTO: 93.9 FL (ref 79–97)
MONOCYTES # BLD AUTO: 0.5 10*3/MM3 (ref 0.1–0.9)
MONOCYTES NFR BLD AUTO: 7.5 % (ref 5–12)
NEUTROPHILS NFR BLD AUTO: 4.07 10*3/MM3 (ref 1.7–7)
NEUTROPHILS NFR BLD AUTO: 61.2 % (ref 42.7–76)
NITRITE UR QL STRIP: NEGATIVE
NRBC BLD AUTO-RTO: 0 /100 WBC (ref 0–0.2)
PH UR STRIP.AUTO: 5.5 [PH] (ref 5–8)
PLATELET # BLD AUTO: 195 10*3/MM3 (ref 140–450)
PMV BLD AUTO: 11.7 FL (ref 6–12)
POTASSIUM SERPL-SCNC: 4.2 MMOL/L (ref 3.5–5.2)
PROT SERPL-MCNC: 6.9 G/DL (ref 6–8.5)
PROT UR QL STRIP: NEGATIVE
RBC # BLD AUTO: 5.22 10*6/MM3 (ref 4.14–5.8)
RBC # UR: NORMAL /HPF
REF LAB TEST METHOD: NORMAL
SODIUM SERPL-SCNC: 138 MMOL/L (ref 136–145)
SP GR UR STRIP: 1.01 (ref 1–1.03)
SQUAMOUS #/AREA URNS HPF: NORMAL /HPF
UROBILINOGEN UR QL STRIP: ABNORMAL
WBC # BLD AUTO: 6.65 10*3/MM3 (ref 3.4–10.8)
WBC UR QL AUTO: NORMAL /HPF
WHOLE BLOOD HOLD SPECIMEN: NORMAL
WHOLE BLOOD HOLD SPECIMEN: NORMAL

## 2021-11-16 PROCEDURE — 81001 URINALYSIS AUTO W/SCOPE: CPT | Performed by: EMERGENCY MEDICINE

## 2021-11-16 PROCEDURE — 70450 CT HEAD/BRAIN W/O DYE: CPT

## 2021-11-16 PROCEDURE — 85025 COMPLETE CBC W/AUTO DIFF WBC: CPT | Performed by: EMERGENCY MEDICINE

## 2021-11-16 PROCEDURE — 74176 CT ABD & PELVIS W/O CONTRAST: CPT

## 2021-11-16 PROCEDURE — 80053 COMPREHEN METABOLIC PANEL: CPT | Performed by: EMERGENCY MEDICINE

## 2021-11-16 PROCEDURE — 99283 EMERGENCY DEPT VISIT LOW MDM: CPT

## 2021-11-16 PROCEDURE — 83690 ASSAY OF LIPASE: CPT | Performed by: EMERGENCY MEDICINE

## 2021-11-16 RX ORDER — SODIUM CHLORIDE 9 MG/ML
10 INJECTION INTRAVENOUS AS NEEDED
Status: DISCONTINUED | OUTPATIENT
Start: 2021-11-16 | End: 2021-11-16 | Stop reason: HOSPADM

## 2021-11-16 RX ORDER — CLONIDINE HYDROCHLORIDE 0.1 MG/1
TABLET ORAL
Qty: 30 TABLET | Refills: 0 | Status: SHIPPED | OUTPATIENT
Start: 2021-11-16 | End: 2023-03-24

## 2021-11-16 NOTE — ED PROVIDER NOTES
" EMERGENCY DEPARTMENT ENCOUNTER    Pt Name: Tank Tejeda  MRN: [id number]  Pt :   1970  Room Number:    Date of encounter:  2021  PCP: Nadira Abraham DO  ED Provider: Antoni Johnson PA-C    Historian: Patient      HPI:  Chief Complaint: Headache, elevated blood pressure        Context: Tank Tejeda is a 51 y.o. male who presents to the ED c/o 3-week history of blood pressure \"spiking\".  States his blood pressure has been as high as 193/100s.  He states he has had a throbbing panoccipital/vertex headache for the past 3 weeks, nausea for 2 days, and left flank pain for the past 2 days.  Patient is concerned about his chronic headache, as his father had a brain tumor.  Patient denies prior history of hypertension, states he donates plasma regularly, and his blood pressure is always normal.  He denies vision changes.  He denies stiff neck or photophobia.  No unilateral weakness paresis or paresthesias.  No shortness of breath cough chest congestion sputum or hemoptysis.  No chest arm neck jaw shoulder pain palpitations or irregular heartbeat.  No dysuria hematuria pyuria.  No melena hematochezia diarrhea or constipation.  He does have a history of hyperlipidemia, Bell's palsy, arthritis, and seasonal allergies.  PCP is Nadira Abraham.      PAST MEDICAL HISTORY  Past Medical History:   Diagnosis Date   • Allergic    • Arthritis    • Bell's palsy    • Depression    • Hyperlipidemia    • Seasonal allergies          PAST SURGICAL HISTORY  Past Surgical History:   Procedure Laterality Date   • CYST REMOVAL Right    • NO PAST SURGERIES           FAMILY HISTORY  Family History   Problem Relation Age of Onset   • No Known Problems Mother    • Diabetes Father    • Birth defects Maternal Grandfather          SOCIAL HISTORY  Social History     Socioeconomic History   • Marital status:    Tobacco Use   • Smoking status: Former Smoker     Types: Cigarettes     Quit date: 7/15/2019     Years since " quittin.3   • Smokeless tobacco: Never Used   Vaping Use   • Vaping Use: Never used   Substance and Sexual Activity   • Alcohol use: No   • Drug use: No   • Sexual activity: Defer         ALLERGIES  Trazodone and Wellbutrin [bupropion]        REVIEW OF SYSTEMS  Review of Systems   Constitutional: Positive for activity change. Negative for appetite change, chills, fatigue and fever.   HENT: Negative for congestion, rhinorrhea and sore throat.    Eyes: Negative for photophobia and visual disturbance.   Respiratory: Negative for cough, shortness of breath and wheezing.    Cardiovascular: Negative for chest pain, palpitations and leg swelling.   Gastrointestinal: Negative for abdominal pain, nausea and vomiting.   Genitourinary: Positive for flank pain. Negative for dysuria and hematuria.   Musculoskeletal: Negative for arthralgias, back pain, myalgias and neck pain.   Neurological: Positive for headaches. Negative for dizziness, seizures and syncope.            PHYSICAL EXAM    I have reviewed the triage vital signs and nursing notes.    ED Triage Vitals   Temp Heart Rate Resp BP SpO2   21 0853 21 0853 21 0853 21 0908 21 0853   98.1 °F (36.7 °C) 64 18 149/96 99 %      Temp src Heart Rate Source Patient Position BP Location FiO2 (%)   21 0853 21 0853 21 0908 21 0908 --   Oral Monitor Sitting Left arm        Physical Exam  GENERAL:   Awake alert and oriented nontoxic-appearing afebrile hemodynamically stable  HENT: Nares patent.  No facial asymmetry.  PERRL EOMI.  EYES: No scleral icterus.  CV: Regular rhythm, regular rate.  RESPIRATORY: Normal effort.  No audible wheezes, rales or rhonchi.  ABDOMEN: Soft, nontender  MUSCULOSKELETAL: No deformities.   NEURO: Alert, moves all extremities, follows commands.    SKIN: Warm, dry, no rash visualized.        LAB RESULTS  Recent Results (from the past 24 hour(s))   Comprehensive Metabolic Panel    Collection Time:  11/16/21  9:11 AM    Specimen: Blood   Result Value Ref Range    Glucose 117 (H) 65 - 99 mg/dL    BUN 9 6 - 20 mg/dL    Creatinine 1.05 0.76 - 1.27 mg/dL    Sodium 138 136 - 145 mmol/L    Potassium 4.2 3.5 - 5.2 mmol/L    Chloride 105 98 - 107 mmol/L    CO2 23.0 22.0 - 29.0 mmol/L    Calcium 9.1 8.6 - 10.5 mg/dL    Total Protein 6.9 6.0 - 8.5 g/dL    Albumin 4.10 3.50 - 5.20 g/dL    ALT (SGPT) 34 1 - 41 U/L    AST (SGOT) 24 1 - 40 U/L    Alkaline Phosphatase 83 39 - 117 U/L    Total Bilirubin 0.5 0.0 - 1.2 mg/dL    eGFR  African Amer 91 >60 mL/min/1.73    Globulin 2.8 gm/dL    A/G Ratio 1.5 g/dL    BUN/Creatinine Ratio 8.6 7.0 - 25.0    Anion Gap 10.0 5.0 - 15.0 mmol/L   Lipase    Collection Time: 11/16/21  9:11 AM    Specimen: Blood   Result Value Ref Range    Lipase 35 13 - 60 U/L   Green Top (Gel)    Collection Time: 11/16/21  9:11 AM   Result Value Ref Range    Extra Tube Hold for add-ons.    Lavender Top    Collection Time: 11/16/21  9:11 AM   Result Value Ref Range    Extra Tube hold for add-on    Gold Top - SST    Collection Time: 11/16/21  9:11 AM   Result Value Ref Range    Extra Tube Hold for add-ons.    Gray Top    Collection Time: 11/16/21  9:11 AM   Result Value Ref Range    Extra Tube Hold for add-ons.    Light Blue Top    Collection Time: 11/16/21  9:11 AM   Result Value Ref Range    Extra Tube hold for add-on    CBC Auto Differential    Collection Time: 11/16/21  9:11 AM    Specimen: Blood   Result Value Ref Range    WBC 6.65 3.40 - 10.80 10*3/mm3    RBC 5.22 4.14 - 5.80 10*6/mm3    Hemoglobin 16.0 13.0 - 17.7 g/dL    Hematocrit 49.0 37.5 - 51.0 %    MCV 93.9 79.0 - 97.0 fL    MCH 30.7 26.6 - 33.0 pg    MCHC 32.7 31.5 - 35.7 g/dL    RDW 12.8 12.3 - 15.4 %    RDW-SD 44.5 37.0 - 54.0 fl    MPV 11.7 6.0 - 12.0 fL    Platelets 195 140 - 450 10*3/mm3    Neutrophil % 61.2 42.7 - 76.0 %    Lymphocyte % 29.3 19.6 - 45.3 %    Monocyte % 7.5 5.0 - 12.0 %    Eosinophil % 1.2 0.3 - 6.2 %    Basophil % 0.5 0.0 -  1.5 %    Immature Grans % 0.3 0.0 - 0.5 %    Neutrophils, Absolute 4.07 1.70 - 7.00 10*3/mm3    Lymphocytes, Absolute 1.95 0.70 - 3.10 10*3/mm3    Monocytes, Absolute 0.50 0.10 - 0.90 10*3/mm3    Eosinophils, Absolute 0.08 0.00 - 0.40 10*3/mm3    Basophils, Absolute 0.03 0.00 - 0.20 10*3/mm3    Immature Grans, Absolute 0.02 0.00 - 0.05 10*3/mm3    nRBC 0.0 0.0 - 0.2 /100 WBC   Urinalysis With Microscopic If Indicated (No Culture) - Urine, Clean Catch    Collection Time: 11/16/21 11:10 AM    Specimen: Urine, Clean Catch   Result Value Ref Range    Color, UA Yellow Yellow, Straw    Appearance, UA Cloudy (A) Clear    pH, UA 5.5 5.0 - 8.0    Specific Gravity, UA 1.012 1.001 - 1.030    Glucose, UA Negative Negative    Ketones, UA Negative Negative    Bilirubin, UA Negative Negative    Blood, UA Negative Negative    Protein, UA Negative Negative    Leuk Esterase, UA Negative Negative    Nitrite, UA Negative Negative    Urobilinogen, UA 0.2 E.U./dL 0.2 - 1.0 E.U./dL   Urinalysis, Microscopic Only - Urine, Clean Catch    Collection Time: 11/16/21 11:10 AM    Specimen: Urine, Clean Catch   Result Value Ref Range    RBC, UA None Seen None Seen, 0-2 /HPF    WBC, UA None Seen None Seen, 0-2 /HPF    Bacteria, UA None Seen None Seen, Trace /HPF    Squamous Epithelial Cells, UA None Seen None Seen, 0-2 /HPF    Hyaline Casts, UA None Seen 0 - 6 /LPF    Methodology Automated Microscopy        If labs were ordered, I independently reviewed the results.        RADIOLOGY  CT Abdomen Pelvis Without Contrast    Result Date: 11/16/2021  EXAMINATION: CT ABDOMEN PELVIS WO CONTRAST-  INDICATION: L flank pain  TECHNIQUE: Noncontrast CT the abdomen and pelvis  COMPARISON: CT abdomen and pelvis 4/4/2019  FINDINGS:  The lung bases and lower thorax appear unremarkable. Stable appearance of a few scattered hepatic hypodensities, likely hepatic cysts. Normal appearance of the gallbladder and bile ducts. The spleen, pancreas, and adrenal glands are  normal. Normal noncontrast appearance of the kidneys without evidence of hydronephrosis or nephrolithiasis. Normal appearance of the ureters and bladder. The prostate and seminal vesicles appear within normal limits. Unremarkable appearance of the GI tract without bowel obstruction. The appendix is normal. No free intra-abdominal fluid or pneumoperitoneum. No conspicuous mesenteric fat stranding. The body wall soft tissues are unremarkable. Unremarkable noncontrast appearance of the vasculature. No acute osseous findings.       No acute abdominopelvic findings.   This report was finalized on 11/16/2021 12:30 PM by Pal Boswell MD.      CT Head Without Contrast    Result Date: 11/16/2021  EXAMINATION: CT HEAD WO CONTRAST-  INDICATION: HA, HTN, father had brain tumor  TECHNIQUE: Noncontrast CT of the head  COMPARISON: Head CT 10/21/2018  FINDINGS:  No acute intracranial hemorrhage or large territory infarct. The gray-white differentiation is grossly preserved. Normal size and configuration of the ventricles. No midline shift or herniation. No extra-axial collections. Unremarkable appearance of the orbits. The mastoid air cells and paranasal sinuses are clear. No acute osseous findings.       No acute intracranial findings.   This report was finalized on 11/16/2021 12:15 PM by Pal Boswell MD.            PROCEDURES    Procedures    No orders to display       MEDICATIONS GIVEN IN ER    Medications - No data to display      PROGRESS, DATA ANALYSIS, CONSULTS, AND MEDICAL DECISION MAKING    All labs have been independently reviewed by me.  All radiology studies have been reviewed by me and the radiologist dictating the report.   EKG's have been independently viewed and interpreted by me.      Patient blood pressure was slightly elevated upon presentation, but during course of stay in the ED, he was a little labile, with the lowest blood pressure noted being 123/93.  CT of the head and abdomen negative for acute  findings.  Offered patient headache cocktail, however he declined stating that he has had this headache for weeks, and when the medication wears off his headache will return.  I will refer him back to his primary care provider for further evaluation of his blood pressure and headache.  I have written him a short-term prescription for clonidine 0.1 mg p.o. twice daily for systolic BP over 160.  Signs and symptoms of worsening were reviewed and he was encouraged return immediately if any change or worsening of symptoms.  He verbalized understanding and is agreeable to plan.               AS OF 16:18 EST VITALS:    BP - (!) 147/107  HR - 60  TEMP - 98.1 °F (36.7 °C) (Oral)  O2 SATS - 100%        DIAGNOSIS  Final diagnoses:   Labile blood pressure         DISPOSITION  DISCHARGE    Patient discharged in stable condition.    Reviewed implications of results, diagnosis, meds, responsibility to follow up, warning signs and symptoms of possible worsening, potential complications and reasons to return to ER.    Patient/Family voiced understanding of above instructions.    Discussed plan for discharge, as there is no emergent indication for admission.  Pt/family is agreeable and understands need for follow up and possible repeat testing.  Pt/family is aware that discharge does not mean that nothing is wrong but that it indicates no emergency is currently present that requires admission and they must continue care with follow-up as given below or with a physician of their choice.     FOLLOW-UP  Nadira Abraham DO  [address] [place] [address] [place] [id number] [phone]    Schedule an appointment as soon as possible for a visit   For frther evaluation of your blood  pressure         Medication List      New Prescriptions    cloNIDine 0.1 MG tablet  Commonly known as: CATAPRES  Take one tablet twice daily as needed for top blood pressure reading greater than 160.           Where to Get Your Medications      These  medications were sent to [facility] [address] - [address] [facility] [address] [facility] [place] [address] - [phone]  - [phone] [address], [facility] [address]    Phone: [phone]   · cloNIDine 0.1 MG tablet                Antoni Johnson PA-C  11/16/21 0998

## 2021-11-16 NOTE — DISCHARGE INSTRUCTIONS
Watch your salt intake, take your blood pressure twice daily and recorded on the attached record sheet.  Call your primary care provider's office and schedule a follow-up visit to review your blood pressures and determine if further evaluation is necessary.  Return to the emergency department immediately if any change or worsening of symptoms.

## 2022-10-12 PROCEDURE — U0004 COV-19 TEST NON-CDC HGH THRU: HCPCS | Performed by: NURSE PRACTITIONER

## 2023-03-27 ENCOUNTER — OFFICE VISIT (OUTPATIENT)
Dept: FAMILY MEDICINE CLINIC | Facility: CLINIC | Age: 53
End: 2023-03-27
Payer: COMMERCIAL

## 2023-03-27 ENCOUNTER — LAB (OUTPATIENT)
Dept: LAB | Facility: HOSPITAL | Age: 53
End: 2023-03-27
Payer: COMMERCIAL

## 2023-03-27 VITALS
OXYGEN SATURATION: 99 % | BODY MASS INDEX: 35.42 KG/M2 | HEART RATE: 75 BPM | WEIGHT: 276 LBS | HEIGHT: 74 IN | SYSTOLIC BLOOD PRESSURE: 134 MMHG | TEMPERATURE: 98 F | RESPIRATION RATE: 22 BRPM | DIASTOLIC BLOOD PRESSURE: 80 MMHG

## 2023-03-27 DIAGNOSIS — Z00.00 ANNUAL PHYSICAL EXAM: ICD-10-CM

## 2023-03-27 DIAGNOSIS — Z00.00 ANNUAL PHYSICAL EXAM: Primary | ICD-10-CM

## 2023-03-27 DIAGNOSIS — Z12.5 SCREENING FOR MALIGNANT NEOPLASM OF PROSTATE: ICD-10-CM

## 2023-03-27 DIAGNOSIS — Z12.11 SCREENING FOR COLON CANCER: ICD-10-CM

## 2023-03-27 DIAGNOSIS — Z12.2 SCREENING FOR LUNG CANCER: ICD-10-CM

## 2023-03-27 DIAGNOSIS — R19.7 DIARRHEA, UNSPECIFIED TYPE: ICD-10-CM

## 2023-03-27 LAB
25(OH)D3 SERPL-MCNC: <6 NG/ML (ref 30–100)
ALBUMIN SERPL-MCNC: 4.2 G/DL (ref 3.5–5.2)
ALBUMIN/GLOB SERPL: 1.9 G/DL
ALP SERPL-CCNC: 70 U/L (ref 39–117)
ALT SERPL W P-5'-P-CCNC: 35 U/L (ref 1–41)
ANION GAP SERPL CALCULATED.3IONS-SCNC: 9.4 MMOL/L (ref 5–15)
AST SERPL-CCNC: 24 U/L (ref 1–40)
BILIRUB SERPL-MCNC: 0.5 MG/DL (ref 0–1.2)
BUN SERPL-MCNC: 17 MG/DL (ref 6–20)
BUN/CREAT SERPL: 13 (ref 7–25)
CALCIUM SPEC-SCNC: 8.9 MG/DL (ref 8.6–10.5)
CHLORIDE SERPL-SCNC: 109 MMOL/L (ref 98–107)
CHOLEST SERPL-MCNC: 226 MG/DL (ref 0–200)
CO2 SERPL-SCNC: 21.6 MMOL/L (ref 22–29)
CREAT SERPL-MCNC: 1.31 MG/DL (ref 0.76–1.27)
DEPRECATED RDW RBC AUTO: 42.1 FL (ref 37–54)
EGFRCR SERPLBLD CKD-EPI 2021: 65.9 ML/MIN/1.73
ERYTHROCYTE [DISTWIDTH] IN BLOOD BY AUTOMATED COUNT: 12.7 % (ref 12.3–15.4)
GLOBULIN UR ELPH-MCNC: 2.2 GM/DL
GLUCOSE SERPL-MCNC: 98 MG/DL (ref 65–99)
HBA1C MFR BLD: 6.5 % (ref 4.8–5.6)
HCT VFR BLD AUTO: 47.3 % (ref 37.5–51)
HCV AB SER DONR QL: NORMAL
HDLC SERPL-MCNC: 35 MG/DL (ref 40–60)
HGB BLD-MCNC: 16.1 G/DL (ref 13–17.7)
HIV1+2 AB SER QL: NORMAL
LDLC SERPL CALC-MCNC: 176 MG/DL (ref 0–100)
LDLC/HDLC SERPL: 4.98 {RATIO}
MCH RBC QN AUTO: 30.8 PG (ref 26.6–33)
MCHC RBC AUTO-ENTMCNC: 34 G/DL (ref 31.5–35.7)
MCV RBC AUTO: 90.4 FL (ref 79–97)
PLATELET # BLD AUTO: 208 10*3/MM3 (ref 140–450)
PMV BLD AUTO: 11.8 FL (ref 6–12)
POTASSIUM SERPL-SCNC: 4.3 MMOL/L (ref 3.5–5.2)
PROT SERPL-MCNC: 6.4 G/DL (ref 6–8.5)
PSA SERPL-MCNC: 0.58 NG/ML (ref 0–4)
RBC # BLD AUTO: 5.23 10*6/MM3 (ref 4.14–5.8)
SODIUM SERPL-SCNC: 140 MMOL/L (ref 136–145)
TRIGL SERPL-MCNC: 83 MG/DL (ref 0–150)
TSH SERPL DL<=0.05 MIU/L-ACNC: 1.28 UIU/ML (ref 0.27–4.2)
VIT B12 BLD-MCNC: 302 PG/ML (ref 211–946)
VLDLC SERPL-MCNC: 15 MG/DL (ref 5–40)
WBC NRBC COR # BLD: 7.53 10*3/MM3 (ref 3.4–10.8)

## 2023-03-27 PROCEDURE — 36415 COLL VENOUS BLD VENIPUNCTURE: CPT

## 2023-03-27 PROCEDURE — 83036 HEMOGLOBIN GLYCOSYLATED A1C: CPT

## 2023-03-27 PROCEDURE — 82306 VITAMIN D 25 HYDROXY: CPT

## 2023-03-27 PROCEDURE — 80050 GENERAL HEALTH PANEL: CPT

## 2023-03-27 PROCEDURE — 86803 HEPATITIS C AB TEST: CPT

## 2023-03-27 PROCEDURE — 86364 TISS TRNSGLTMNASE EA IG CLAS: CPT

## 2023-03-27 PROCEDURE — G0103 PSA SCREENING: HCPCS

## 2023-03-27 PROCEDURE — G0432 EIA HIV-1/HIV-2 SCREEN: HCPCS

## 2023-03-27 PROCEDURE — 82607 VITAMIN B-12: CPT

## 2023-03-27 PROCEDURE — 86231 EMA EACH IG CLASS: CPT

## 2023-03-27 PROCEDURE — 80061 LIPID PANEL: CPT

## 2023-03-27 PROCEDURE — 82784 ASSAY IGA/IGD/IGG/IGM EACH: CPT

## 2023-03-27 NOTE — ASSESSMENT & PLAN NOTE
Annual exam  Colonoscopy ordered  Lung cancer screen : he is agreeable  Counseled on diet and exercise including limited sweets and sugars to focus on lean meat and vegetables. Counseled on 50 minutes of moderate exercise 3 times per week.     Discussed alcohol use. He says he drinks very little at this time.   Recommend dental and eye exam  Psa: pt agreeable   hiv hep c screening: he is agreeable  covid vaccine: recommended booster  Shingles vaccine: recommended   Tdap: recommended

## 2023-03-28 DIAGNOSIS — E11.65 TYPE 2 DIABETES MELLITUS WITH HYPERGLYCEMIA, UNSPECIFIED WHETHER LONG TERM INSULIN USE: Primary | ICD-10-CM

## 2023-03-28 LAB
ENDOMYSIUM IGA SER QL: NEGATIVE
IGA SERPL-MCNC: 150 MG/DL (ref 90–386)
TTG IGA SER-ACNC: <2 U/ML (ref 0–3)

## 2023-03-28 RX ORDER — ERGOCALCIFEROL 1.25 MG/1
50000 CAPSULE ORAL WEEKLY
Qty: 8 CAPSULE | Refills: 0 | Status: SHIPPED | OUTPATIENT
Start: 2023-03-28

## 2023-03-28 RX ORDER — ROSUVASTATIN CALCIUM 5 MG/1
5 TABLET, COATED ORAL DAILY
Qty: 90 TABLET | Refills: 0 | Status: SHIPPED | OUTPATIENT
Start: 2023-03-28

## 2023-03-29 ENCOUNTER — LAB (OUTPATIENT)
Dept: LAB | Facility: HOSPITAL | Age: 53
End: 2023-03-29
Payer: COMMERCIAL

## 2023-03-29 DIAGNOSIS — E11.65 TYPE 2 DIABETES MELLITUS WITH HYPERGLYCEMIA, UNSPECIFIED WHETHER LONG TERM INSULIN USE: ICD-10-CM

## 2023-03-29 LAB
ALBUMIN UR-MCNC: <1.2 MG/DL
CREAT UR-MCNC: 206.8 MG/DL
MICROALBUMIN/CREAT UR: NORMAL MG/G{CREAT}

## 2023-03-29 PROCEDURE — 82570 ASSAY OF URINE CREATININE: CPT

## 2023-03-29 PROCEDURE — 82043 UR ALBUMIN QUANTITATIVE: CPT

## 2023-04-18 RX ORDER — SODIUM PICOSULFATE, MAGNESIUM OXIDE, AND ANHYDROUS CITRIC ACID 10; 3.5; 12 MG/160ML; G/160ML; G/160ML
320 LIQUID ORAL TAKE AS DIRECTED
Qty: 320 ML | Refills: 0 | Status: SHIPPED | OUTPATIENT
Start: 2023-04-18

## 2023-04-26 ENCOUNTER — TELEPHONE (OUTPATIENT)
Dept: GASTROENTEROLOGY | Facility: CLINIC | Age: 53
End: 2023-04-26

## 2023-04-26 ENCOUNTER — OUTSIDE FACILITY SERVICE (OUTPATIENT)
Dept: GASTROENTEROLOGY | Facility: CLINIC | Age: 53
End: 2023-04-26
Payer: COMMERCIAL

## 2023-04-26 PROCEDURE — 88305 TISSUE EXAM BY PATHOLOGIST: CPT | Performed by: INTERNAL MEDICINE

## 2023-04-26 NOTE — TELEPHONE ENCOUNTER
Radha from [facility] calling on behalf of Dr. Urrutia.    Radha has been informed by Dr. Urrutia that Dr. Hernandez will be referring patient for cecal resection.    Radha called to request records, to be sent once the op report from today is finalized.

## 2023-04-27 ENCOUNTER — LAB REQUISITION (OUTPATIENT)
Dept: LAB | Facility: HOSPITAL | Age: 53
End: 2023-04-27
Payer: COMMERCIAL

## 2023-04-27 DIAGNOSIS — Z12.11 ENCOUNTER FOR SCREENING FOR MALIGNANT NEOPLASM OF COLON: ICD-10-CM

## 2023-04-27 DIAGNOSIS — D12.0 BENIGN NEOPLASM OF CECUM: ICD-10-CM

## 2023-04-27 DIAGNOSIS — K64.8 OTHER HEMORRHOIDS: ICD-10-CM

## 2023-04-27 DIAGNOSIS — D12.1 BENIGN NEOPLASM OF APPENDIX: ICD-10-CM

## 2023-04-28 LAB
CYTO UR: NORMAL
LAB AP CASE REPORT: NORMAL
LAB AP CLINICAL INFORMATION: NORMAL
LAB AP DIAGNOSIS COMMENT: NORMAL
PATH REPORT.FINAL DX SPEC: NORMAL
PATH REPORT.GROSS SPEC: NORMAL

## 2023-05-02 ENCOUNTER — HOSPITAL ENCOUNTER (OUTPATIENT)
Dept: CT IMAGING | Facility: HOSPITAL | Age: 53
Discharge: HOME | End: 2023-05-02
Admitting: STUDENT IN AN ORGANIZED HEALTH CARE EDUCATION/TRAINING PROGRAM
Payer: COMMERCIAL

## 2023-05-02 DIAGNOSIS — Z12.2 SCREENING FOR LUNG CANCER: ICD-10-CM

## 2023-05-02 PROCEDURE — 71271 CT THORAX LUNG CANCER SCR C-: CPT

## 2023-08-28 ENCOUNTER — PRE-ADMISSION TESTING (OUTPATIENT)
Dept: PREADMISSION TESTING | Facility: HOSPITAL | Age: 53
End: 2023-08-28
Payer: COMMERCIAL

## 2023-08-28 VITALS — WEIGHT: 261.8 LBS | BODY MASS INDEX: 34.7 KG/M2 | HEIGHT: 73 IN

## 2023-08-28 LAB
ANION GAP SERPL CALCULATED.3IONS-SCNC: 11 MMOL/L (ref 5–15)
BUN SERPL-MCNC: 17 MG/DL (ref 6–20)
BUN/CREAT SERPL: 13.7 (ref 7–25)
CALCIUM SPEC-SCNC: 9.5 MG/DL (ref 8.6–10.5)
CHLORIDE SERPL-SCNC: 105 MMOL/L (ref 98–107)
CO2 SERPL-SCNC: 25 MMOL/L (ref 22–29)
CREAT SERPL-MCNC: 1.24 MG/DL (ref 0.76–1.27)
DEPRECATED RDW RBC AUTO: 45.9 FL (ref 37–54)
EGFRCR SERPLBLD CKD-EPI 2021: 70.4 ML/MIN/1.73
ERYTHROCYTE [DISTWIDTH] IN BLOOD BY AUTOMATED COUNT: 13.5 % (ref 12.3–15.4)
GLUCOSE SERPL-MCNC: 129 MG/DL (ref 65–99)
HBA1C MFR BLD: 6.2 % (ref 4.8–5.6)
HCT VFR BLD AUTO: 51.5 % (ref 37.5–51)
HGB BLD-MCNC: 16.6 G/DL (ref 13–17.7)
MCH RBC QN AUTO: 29.7 PG (ref 26.6–33)
MCHC RBC AUTO-ENTMCNC: 32.2 G/DL (ref 31.5–35.7)
MCV RBC AUTO: 92.3 FL (ref 79–97)
PLATELET # BLD AUTO: 207 10*3/MM3 (ref 140–450)
PMV BLD AUTO: 11.8 FL (ref 6–12)
POTASSIUM SERPL-SCNC: 4.2 MMOL/L (ref 3.5–5.2)
QT INTERVAL: 372 MS
QTC INTERVAL: 362 MS
RBC # BLD AUTO: 5.58 10*6/MM3 (ref 4.14–5.8)
SODIUM SERPL-SCNC: 141 MMOL/L (ref 136–145)
WBC NRBC COR # BLD: 7.43 10*3/MM3 (ref 3.4–10.8)

## 2023-08-28 PROCEDURE — 36415 COLL VENOUS BLD VENIPUNCTURE: CPT

## 2023-08-28 PROCEDURE — 80048 BASIC METABOLIC PNL TOTAL CA: CPT

## 2023-08-28 PROCEDURE — 83036 HEMOGLOBIN GLYCOSYLATED A1C: CPT

## 2023-08-28 PROCEDURE — 93005 ELECTROCARDIOGRAM TRACING: CPT

## 2023-08-28 PROCEDURE — 85027 COMPLETE CBC AUTOMATED: CPT

## 2023-08-28 RX ORDER — MULTIPLE VITAMINS W/ MINERALS TAB 9MG-400MCG
1 TAB ORAL DAILY
COMMUNITY

## 2023-08-28 NOTE — PAT
An arrival time for procedure was not provided during PAT visit. If patient had any questions or concerns about their arrival time, they were instructed to contact their surgeon/physician.  Additionally, if the patient referred to an arrival time that was acquired from their my chart account, patient was encouraged to verify that time with their surgeon/physician. Arrival times are NOT provided in Pre Admission Testing Department.    Patient denies any current skin issues.     Patient to apply Chlorhexadine wipes  to surgical area (as instructed) the night before procedure and the AM of procedure. Wipes provided.    Patient viewed general PAT education video as instructed in their preoperative information received from their surgeon.  Patient stated the general PAT education video was viewed in its entirety and survey completed.  Copies of PAT general education handouts (Incentive Spirometry, Meds to Beds Program, Patient Belongings, Pre-op skin preparation instructions, Blood Glucose testing, Visitor policy, Surgery FAQ, Code H) distributed to patient if not printed. Education related to the PAT pass and skin preparation for surgery (if applicable) completed in PAT as a reinforcement to PAT education video. Patient instructed to return PAT pass provided today as well as completed skin preparation sheet (if applicable) on the day of procedure.     Additionally if patient had not viewed video yet but intended to view it at home or in our waiting area, then referred them to the handout with QR code/link provided during PAT visit.  Instructed patient to complete survey after viewing the video in its entirety.  Encouraged patient/family to read PAT general education handouts thoroughly and notify PAT staff with any questions or concerns. Patient verbalized understanding of all information and priority content.    Patient instructed to drink 20 ounces of Gatorade and it needs to be completed 1 hour (for Main OR patients)  "or 2 hours (scheduled  section & [facility] patients) before given arrival time for procedure (NO RED Gatorade)    Patient verbalized understanding.    Ten (8 ounce) Impact Advanced Recovery Nutritional Drinks distributed to patient from Pre Admission Testing Department.  Verbal and written instructions given that patient must consume two (8 ounce) Impact drinks a day for five days before surgery.  Flavoring tip sheet provided as well the brochure \"Go in Stronger. Get Home Sooner\" that explains benefits of nutritional drinks to enhance recovery. Patient/family verbalized understanding.     NURSE NAVIGATOR CONSULTED.    EKG from [facility] today faxed to anesthesiology department for review and cardiac clearance. RN spoke with  DR. CHIANG  and reviewed pertinent medical history and EKG results.  Per DR. CHIANG, patient is cleared to proceed with procedure as planned without additional cardiac testing. Patient denies chest pain or increased shortness of breath.       "

## 2023-08-29 ENCOUNTER — TELEPHONE (OUTPATIENT)
Dept: FAMILY MEDICINE CLINIC | Facility: CLINIC | Age: 53
End: 2023-08-29

## 2023-08-29 NOTE — TELEPHONE ENCOUNTER
Caller: Dora Tejeda    Relationship: Self    Best call back number:     Caller requesting test results: DORA    What test was performed: ECG    When was the test performed: [id number]    Where was the test performed: [facility]    Additional notes: PATIENT HAD ECG AND HAS CONCERNS REGARDING THE RESULTS AND HAS SURGERY SCHEDULED NEXT WEEK; OK TO LEAVE A MESSAGE IN MY CHART AS HE IS AT WORK

## 2023-08-29 NOTE — TELEPHONE ENCOUNTER
I see the result of the EKG. These ekgs are not always accurate, but I do recommend for him to schedule an apt with me to recheck an EKG and to further discuss if he has any symptoms and go from there. In the meantime if he has any chest pain shortness of breath he should go to the er.

## 2023-08-30 ENCOUNTER — TELEPHONE (OUTPATIENT)
Dept: FAMILY MEDICINE CLINIC | Facility: CLINIC | Age: 53
End: 2023-08-30
Payer: COMMERCIAL

## 2023-08-30 NOTE — TELEPHONE ENCOUNTER
Caller: Tank Tejeda    Relationship: Self    Best call back number: [phone]     Additional notes: PATIENT ATTEMPTED TO SCHEDULE AN APPOINTMENT ON 9/5/23 BEFORE 12:00 PM, HOWEVER NO PROVIDERS WERE AVAILABLE AT THIS TIME. PATIENT STATES HE WILL CALLBACK TO SCHEDULE AN APPOINTMENT AFTER 9/6/23 AFTER HE HAS SURGERY.

## 2023-08-30 NOTE — TELEPHONE ENCOUNTER
"Lm for pt to call us back, hub please read: \"  I see the result of the EKG. These ekgs are not always accurate, but I do recommend for him to schedule an apt with me to recheck an EKG and to further discuss if he has any symptoms and go from there. In the meantime if he has any chest pain shortness of breath he should go to the er.    \"  "

## 2023-08-31 NOTE — TELEPHONE ENCOUNTER
I do recommend before his surgery and we do have apts available if not with me we have availability with other providers.

## 2023-09-01 ENCOUNTER — OFFICE VISIT (OUTPATIENT)
Dept: FAMILY MEDICINE CLINIC | Facility: CLINIC | Age: 53
End: 2023-09-01
Payer: COMMERCIAL

## 2023-09-01 VITALS
DIASTOLIC BLOOD PRESSURE: 88 MMHG | HEIGHT: 73 IN | SYSTOLIC BLOOD PRESSURE: 126 MMHG | WEIGHT: 264 LBS | BODY MASS INDEX: 34.99 KG/M2 | TEMPERATURE: 99.1 F

## 2023-09-01 DIAGNOSIS — R94.31 ABNORMAL EKG: Primary | ICD-10-CM

## 2023-09-01 PROCEDURE — 99213 OFFICE O/P EST LOW 20 MIN: CPT | Performed by: FAMILY MEDICINE

## 2023-09-01 PROCEDURE — 93000 ELECTROCARDIOGRAM COMPLETE: CPT | Performed by: FAMILY MEDICINE

## 2023-09-01 RX ORDER — DIAZEPAM 5 MG/1
TABLET ORAL
COMMUNITY
End: 2023-09-01

## 2023-09-01 RX ORDER — PREDNISONE 20 MG/1
TABLET ORAL
COMMUNITY

## 2023-09-01 RX ORDER — FLUTICASONE PROPIONATE 50 MCG
SPRAY, SUSPENSION (ML) NASAL
COMMUNITY

## 2023-09-01 RX ORDER — CLOBETASOL PROPIONATE 0.5 MG/G
OINTMENT TOPICAL
COMMUNITY

## 2023-09-01 RX ORDER — SERTRALINE HYDROCHLORIDE 25 MG/1
TABLET, FILM COATED ORAL
COMMUNITY

## 2023-09-01 RX ORDER — PROPRANOLOL HCL 60 MG
CAPSULE, EXTENDED RELEASE 24HR ORAL
COMMUNITY

## 2023-09-01 RX ORDER — BUSPIRONE HYDROCHLORIDE 5 MG/1
TABLET ORAL
COMMUNITY
End: 2023-09-01

## 2023-09-01 RX ORDER — CLOBETASOL PROPIONATE 0.5 MG/G
OINTMENT TOPICAL
Status: CANCELLED | OUTPATIENT
Start: 2023-09-01

## 2023-09-01 RX ORDER — MECLIZINE HYDROCHLORIDE 25 MG/1
TABLET ORAL
COMMUNITY

## 2023-09-01 NOTE — ASSESSMENT & PLAN NOTE
Repeat EKG in office today shows sinus rhythm with a rate of 64.  No evidence of age-indeterminate infarct.  Possible that it was a machine error.

## 2023-09-01 NOTE — PROGRESS NOTES
Answers submitted by the patient for this visit:  Primary Reason for Visit (Submitted on 8/31/2023)  What is the primary reason for your visit?: Other  Other (Submitted on 8/31/2023)  Please describe your symptoms.: ECG ABNORMAL  Have you had these symptoms before?: No  How long have you been having these symptoms?: Greater than 2 weeks        Subjective     Chief Complaint  Results    Subjective          Tank Tejeda is a 53 y.o. male who presents today to [facility] for follow up.    HPI:   History of Present Illness    Mr. Tejeda is a 53-year-old male who presents today to follow-up after having a abnormal EKG for preadmit testing.  He was having this completed for a colon resection that is scheduled on 9/6/2023.  He was still cleared for surgery however was encouraged to follow-up.  The initial EKG showed signs of sinus bradycardia and inferior infarct.  He is not experiencing chest pain.  He reports that at the time of having the EKG they were having some issues with the leads.      The following portions of the patient's history were reviewed and updated as appropriate: allergies, current medications, past family history, past medical history, past social history, past surgical history and problem list.    Objective     Objective     Allergy:   Allergies   Allergen Reactions    Trazodone Anaphylaxis    Wellbutrin [Bupropion] Other (See Comments)     Panic attacks          Current Medications:   Current Outpatient Medications   Medication Sig Dispense Refill    clobetasol (TEMOVATE) 0.05 % ointment       Diclofenac Sodium (VOLTAREN) 1 % gel gel Apply 4 g topically to the appropriate area as directed 2 (Two) Times a Day. 100 g 2    fluticasone (FLONASE) 50 MCG/ACT nasal spray Spray 1 spray twice a day by intranasal route.      meclizine (ANTIVERT) 25 MG tablet       multivitamin with minerals tablet tablet Take 1 tablet by mouth Daily.      predniSONE (DELTASONE) 20 MG tablet     "   propranolol LA (INDERAL LA) 60 MG 24 hr capsule       sertraline (ZOLOFT) 25 MG tablet       valACYclovir (Valtrex) 500 MG tablet Take 1 tablet by mouth Daily. (Patient taking differently: Take 1 tablet by mouth As Needed.) 30 tablet 3     No current facility-administered medications for this visit.       Past Medical History:  Past Medical History:   Diagnosis Date    Allergic     Anxiety always    Arthritis     Bell's palsy         Depression     Diabetes mellitus     GERD (gastroesophageal reflux disease)     Hyperlipidemia     Pneumonia     Seasonal allergies     Substance abuse 2000    Tremor 2007    Visual impairment        Past Surgical History:  Past Surgical History:   Procedure Laterality Date    COLONOSCOPY      CYST REMOVAL Right        Social History:  Social History     Socioeconomic History    Marital status:    Tobacco Use    Smoking status: Former     Packs/day: 1.00     Years: 27.00     Pack years: 27.00     Types: Cigarettes     Quit date: 6/15/2018     Years since quittin.2     Passive exposure: Past    Smokeless tobacco: Never   Vaping Use    Vaping Use: Never used   Substance and Sexual Activity    Alcohol use: No    Drug use: Not Currently     Comment: alcohol abuse for years    Sexual activity: Defer     Partners: Female     Birth control/protection: Abstinence       Family History:  Family History   Problem Relation Age of Onset    Depression Mother     Diabetes Father     Alcohol abuse Father     Arthritis Father     Birth defects Maternal Grandfather     Mental illness Maternal Grandfather     Stroke Maternal Grandmother     Vision loss Paternal Grandfather     Cancer Paternal Grandmother     Asthma Sister     Asthma Daughter          Vital Signs:   /88   Temp 99.1 øF (37.3 øC) (Infrared)   Ht 185.4 cm (72.99\")   Wt 120 kg (264 lb)   BMI 34.84 kg/mý      Physical Exam:  Physical Exam  Constitutional:       Appearance: He is not ill-appearing. "   Cardiovascular:      Rate and Rhythm: Normal rate.      Pulses: Normal pulses.   Pulmonary:      Effort: Pulmonary effort is normal.      Breath sounds: Normal breath sounds.   Neurological:      General: No focal deficit present.      Mental Status: He is alert. Mental status is at baseline.         ECG 12 Lead    Date/Time: 9/1/2023 4:59 PM  Performed by: Sylvia Brown DO  Authorized by: Sylvia Brown DO   Comparison: compared with previous ECG   Rhythm: sinus rhythm  Rate: normal  Conduction: conduction normal  QRS axis: normal  Other: no other findings    Clinical impression: normal ECG         PHQ-9 Score  PHQ-9 Total Score:       Lab Review  Pre-Admission Testing on 08/28/2023   Component Date Value Ref Range Status    Hemoglobin A1C 08/28/2023 6.20 (H)  4.80 - 5.60 % Final    QT Interval 08/28/2023 372  ms Final    QTC Interval 08/28/2023 362  ms Final    WBC 08/28/2023 7.43  3.40 - 10.80 10*3/mm3 Final    RBC 08/28/2023 5.58  4.14 - 5.80 10*6/mm3 Final    Hemoglobin 08/28/2023 16.6  13.0 - 17.7 g/dL Final    Hematocrit 08/28/2023 51.5 (H)  37.5 - 51.0 % Final    MCV 08/28/2023 92.3  79.0 - 97.0 fL Final    MCH 08/28/2023 29.7  26.6 - 33.0 pg Final    MCHC 08/28/2023 32.2  31.5 - 35.7 g/dL Final    RDW 08/28/2023 13.5  12.3 - 15.4 % Final    RDW-SD 08/28/2023 45.9  37.0 - 54.0 fl Final    MPV 08/28/2023 11.8  6.0 - 12.0 fL Final    Platelets 08/28/2023 207  140 - 450 10*3/mm3 Final    Glucose 08/28/2023 129 (H)  65 - 99 mg/dL Final    BUN 08/28/2023 17  6 - 20 mg/dL Final    Creatinine 08/28/2023 1.24  0.76 - 1.27 mg/dL Final    Sodium 08/28/2023 141  136 - 145 mmol/L Final    Potassium 08/28/2023 4.2  3.5 - 5.2 mmol/L Final    Slight hemolysis detected by analyzer. Results may be affected.    Chloride 08/28/2023 105  98 - 107 mmol/L Final    CO2 08/28/2023 25.0  22.0 - 29.0 mmol/L Final    Calcium 08/28/2023 9.5  8.6 - 10.5 mg/dL Final    BUN/Creatinine Ratio 08/28/2023 13.7  7.0 - 25.0 Final    Anion  Gap 08/28/2023 11.0  5.0 - 15.0 mmol/L Final    eGFR 08/28/2023 70.4  >60.0 mL/min/1.73 Final        Radiology Results              Assessment / Plan         Assessment and Plan   Diagnoses and all orders for this visit:    1. Abnormal EKG (Primary)  Assessment & Plan:  Repeat EKG in office today shows sinus rhythm with a rate of 64.  No evidence of age-indeterminate infarct.  Possible that it was a machine error.    Orders:  -     ECG 12 Lead        Discussed possible differential diagnoses, testing, treatment, recommended non-pharmacological interventions, risks, warning signs to monitor for that would indicate need for follow-up in clinic or ER. If no improvement with these regimens or you have new or worsening symptoms follow-up. Patient verbalizes understanding and agreement with plan of care. Denies further needs or concerns.     Patient was given instructions and counseling regarding her condition and for health maintenance advised.    BMI is >= 30 and <35. (Class 1 Obesity). The following options were offered after discussion;: weight loss educational material (shared in after visit summary)             Health Maintenance  Health Maintenance:   Health Maintenance Due   Topic Date Due    DIABETIC FOOT EXAM  Never done    DIABETIC EYE EXAM  Never done        Meds ordered during this visit  No orders of the defined types were placed in this encounter.      Meds stopped during this visit:  Medications Discontinued During This Encounter   Medication Reason    busPIRone (BUSPAR) 5 MG tablet Patient Reported Not Taking    diazePAM (VALIUM) 5 MG tablet Patient Reported Not Taking    Ergocalciferol (VITAMIN D2 PO) Patient Reported Not Taking        Visit Diagnoses    ICD-10-CM ICD-9-CM   1. Abnormal EKG  R94.31 794.31       Patient was given instructions and counseling regarding his condition or for health maintenance advice. Please see specific information pulled into the AVS if appropriate.     Follow Up   Return  for Next scheduled follow up.          This document has been electronically signed by Sylvia Brown DO   September 1, 2023 17:06 EDT    Dictated Utilizing Dragon Dictation: Part of this note may be an electronic transcription/translation of spoken language to printed text using the Dragon Dictation System.    Sylvia Brown D.O.  [facility] [place]

## 2023-09-06 ENCOUNTER — HOSPITAL ENCOUNTER (INPATIENT)
Facility: HOSPITAL | Age: 53
LOS: 3 days | Discharge: HOME | DRG: 331 | End: 2023-09-09
Attending: SURGERY | Admitting: SURGERY
Payer: COMMERCIAL

## 2023-09-06 ENCOUNTER — ANESTHESIA EVENT CONVERTED (OUTPATIENT)
Dept: ANESTHESIOLOGY | Facility: HOSPITAL | Age: 53
DRG: 331 | End: 2023-09-06
Payer: COMMERCIAL

## 2023-09-06 ENCOUNTER — ANESTHESIA (OUTPATIENT)
Dept: PERIOP | Facility: HOSPITAL | Age: 53
DRG: 331 | End: 2023-09-06
Payer: COMMERCIAL

## 2023-09-06 ENCOUNTER — ANESTHESIA EVENT (OUTPATIENT)
Dept: PERIOP | Facility: HOSPITAL | Age: 53
DRG: 331 | End: 2023-09-06
Payer: COMMERCIAL

## 2023-09-06 DIAGNOSIS — K63.5 COLONIC POLYP: ICD-10-CM

## 2023-09-06 LAB
GLUCOSE BLDC GLUCOMTR-MCNC: 117 MG/DL (ref 70–130)
GLUCOSE BLDC GLUCOMTR-MCNC: 163 MG/DL (ref 70–130)

## 2023-09-06 PROCEDURE — 25010000002 BUPIVACAINE (PF) 0.25 % SOLUTION: Performed by: NURSE ANESTHETIST, CERTIFIED REGISTERED

## 2023-09-06 PROCEDURE — 25010000002 CEFAZOLIN PER 500 MG: Performed by: SURGERY

## 2023-09-06 PROCEDURE — 8E0W4CZ ROBOTIC ASSISTED PROCEDURE OF TRUNK REGION, PERCUTANEOUS ENDOSCOPIC APPROACH: ICD-10-PCS | Performed by: SURGERY

## 2023-09-06 PROCEDURE — 25010000002 SUGAMMADEX 200 MG/2ML SOLUTION: Performed by: ANESTHESIOLOGY

## 2023-09-06 PROCEDURE — 25010000002 DEXAMETHASONE SODIUM PHOSPHATE 10 MG/ML SOLUTION: Performed by: NURSE ANESTHETIST, CERTIFIED REGISTERED

## 2023-09-06 PROCEDURE — 25010000002 FENTANYL CITRATE (PF) 50 MCG/ML SOLUTION

## 2023-09-06 PROCEDURE — 25010000002 METRONIDAZOLE 500 MG/100ML SOLUTION: Performed by: ANESTHESIOLOGY

## 2023-09-06 PROCEDURE — 25010000002 HYDROMORPHONE 1 MG/ML SOLUTION

## 2023-09-06 PROCEDURE — 88309 TISSUE EXAM BY PATHOLOGIST: CPT | Performed by: SURGERY

## 2023-09-06 PROCEDURE — 25010000002 INDOCYANINE GREEN 25 MG RECONSTITUTED SOLUTION: Performed by: ANESTHESIOLOGY

## 2023-09-06 PROCEDURE — 0 MEPERIDINE PER 100 MG

## 2023-09-06 PROCEDURE — 25010000002 ONDANSETRON PER 1 MG: Performed by: ANESTHESIOLOGY

## 2023-09-06 PROCEDURE — 25010000002 PROPOFOL 10 MG/ML EMULSION: Performed by: ANESTHESIOLOGY

## 2023-09-06 PROCEDURE — 25010000002 ONDANSETRON PER 1 MG: Performed by: SURGERY

## 2023-09-06 PROCEDURE — 25010000002 DEXAMETHASONE PER 1 MG: Performed by: ANESTHESIOLOGY

## 2023-09-06 PROCEDURE — 82948 REAGENT STRIP/BLOOD GLUCOSE: CPT

## 2023-09-06 PROCEDURE — 44205 LAP COLECTOMY PART W/ILEUM: CPT | Performed by: PHYSICIAN ASSISTANT

## 2023-09-06 PROCEDURE — 25010000002 METOCLOPRAMIDE PER 10 MG: Performed by: SURGERY

## 2023-09-06 PROCEDURE — 25010000002 FENTANYL CITRATE (PF) 100 MCG/2ML SOLUTION: Performed by: ANESTHESIOLOGY

## 2023-09-06 PROCEDURE — 0DTF4ZZ RESECTION OF RIGHT LARGE INTESTINE, PERCUTANEOUS ENDOSCOPIC APPROACH: ICD-10-PCS | Performed by: SURGERY

## 2023-09-06 PROCEDURE — 25010000002 ONDANSETRON PER 1 MG

## 2023-09-06 PROCEDURE — 25010000002 HEPARIN (PORCINE) PER 1000 UNITS: Performed by: SURGERY

## 2023-09-06 PROCEDURE — S0260 H&P FOR SURGERY: HCPCS | Performed by: PHYSICIAN ASSISTANT

## 2023-09-06 DEVICE — STAPLER 60 RELOAD BLUE
Type: IMPLANTABLE DEVICE | Site: ABDOMEN | Status: FUNCTIONAL
Brand: SUREFORM

## 2023-09-06 DEVICE — DEV CONTRL TISS STRATAFIX SPIRAL PDS PLS SH 3/0 6IN 15CM VIL: Type: IMPLANTABLE DEVICE | Site: ABDOMEN | Status: FUNCTIONAL

## 2023-09-06 DEVICE — STAPLER 60 RELOAD WHITE
Type: IMPLANTABLE DEVICE | Site: ABDOMEN | Status: FUNCTIONAL
Brand: SUREFORM

## 2023-09-06 DEVICE — STAPLER 60 RELOAD GREEN
Type: IMPLANTABLE DEVICE | Site: ABDOMEN | Status: FUNCTIONAL
Brand: SUREFORM

## 2023-09-06 RX ORDER — INDOCYANINE GREEN AND WATER 25 MG
KIT INJECTION AS NEEDED
Status: DISCONTINUED | OUTPATIENT
Start: 2023-09-06 | End: 2023-09-06 | Stop reason: SURG

## 2023-09-06 RX ORDER — NALOXONE HCL 0.4 MG/ML
0.4 VIAL (ML) INJECTION AS NEEDED
Status: DISCONTINUED | OUTPATIENT
Start: 2023-09-06 | End: 2023-09-06 | Stop reason: HOSPADM

## 2023-09-06 RX ORDER — PROPOFOL 10 MG/ML
VIAL (ML) INTRAVENOUS AS NEEDED
Status: DISCONTINUED | OUTPATIENT
Start: 2023-09-06 | End: 2023-09-06 | Stop reason: SURG

## 2023-09-06 RX ORDER — MELOXICAM 15 MG/1
15 TABLET ORAL ONCE
Status: COMPLETED | OUTPATIENT
Start: 2023-09-06 | End: 2023-09-06

## 2023-09-06 RX ORDER — MIDAZOLAM HYDROCHLORIDE 1 MG/ML
1 INJECTION INTRAMUSCULAR; INTRAVENOUS
Status: DISCONTINUED | OUTPATIENT
Start: 2023-09-06 | End: 2023-09-06 | Stop reason: HOSPADM

## 2023-09-06 RX ORDER — AMOXICILLIN 250 MG
2 CAPSULE ORAL NIGHTLY
Status: DISCONTINUED | OUTPATIENT
Start: 2023-09-06 | End: 2023-09-09 | Stop reason: HOSPADM

## 2023-09-06 RX ORDER — MAGNESIUM HYDROXIDE 1200 MG/15ML
LIQUID ORAL AS NEEDED
Status: DISCONTINUED | OUTPATIENT
Start: 2023-09-06 | End: 2023-09-06 | Stop reason: HOSPADM

## 2023-09-06 RX ORDER — FAMOTIDINE 20 MG/1
20 TABLET, FILM COATED ORAL ONCE
Status: COMPLETED | OUTPATIENT
Start: 2023-09-06 | End: 2023-09-06

## 2023-09-06 RX ORDER — NALOXONE HCL 0.4 MG/ML
0.1 VIAL (ML) INJECTION
Status: DISCONTINUED | OUTPATIENT
Start: 2023-09-06 | End: 2023-09-09 | Stop reason: HOSPADM

## 2023-09-06 RX ORDER — DIPHENHYDRAMINE HYDROCHLORIDE 50 MG/ML
25 INJECTION INTRAMUSCULAR; INTRAVENOUS EVERY 6 HOURS PRN
Status: DISCONTINUED | OUTPATIENT
Start: 2023-09-06 | End: 2023-09-09 | Stop reason: HOSPADM

## 2023-09-06 RX ORDER — HYDROMORPHONE HCL IN 0.9% NACL 10 MG/50ML
PATIENT CONTROLLED ANALGESIA SYRINGE INTRAVENOUS CONTINUOUS
Status: DISCONTINUED | OUTPATIENT
Start: 2023-09-06 | End: 2023-09-08

## 2023-09-06 RX ORDER — SODIUM CHLORIDE, SODIUM LACTATE, POTASSIUM CHLORIDE, CALCIUM CHLORIDE 600; 310; 30; 20 MG/100ML; MG/100ML; MG/100ML; MG/100ML
100 INJECTION, SOLUTION INTRAVENOUS CONTINUOUS
Status: DISCONTINUED | OUTPATIENT
Start: 2023-09-06 | End: 2023-09-06

## 2023-09-06 RX ORDER — MEPERIDINE HYDROCHLORIDE 25 MG/ML
12.5 INJECTION INTRAMUSCULAR; INTRAVENOUS; SUBCUTANEOUS
Status: DISCONTINUED | OUTPATIENT
Start: 2023-09-06 | End: 2023-09-06 | Stop reason: HOSPADM

## 2023-09-06 RX ORDER — ACETAMINOPHEN 500 MG
1000 TABLET ORAL ONCE
Status: COMPLETED | OUTPATIENT
Start: 2023-09-06 | End: 2023-09-06

## 2023-09-06 RX ORDER — SODIUM CHLORIDE 0.9 % (FLUSH) 0.9 %
10 SYRINGE (ML) INJECTION AS NEEDED
Status: DISCONTINUED | OUTPATIENT
Start: 2023-09-06 | End: 2023-09-09 | Stop reason: HOSPADM

## 2023-09-06 RX ORDER — FENTANYL CITRATE 50 UG/ML
50 INJECTION, SOLUTION INTRAMUSCULAR; INTRAVENOUS
Status: DISCONTINUED | OUTPATIENT
Start: 2023-09-06 | End: 2023-09-06 | Stop reason: HOSPADM

## 2023-09-06 RX ORDER — ROCURONIUM BROMIDE 10 MG/ML
INJECTION, SOLUTION INTRAVENOUS AS NEEDED
Status: DISCONTINUED | OUTPATIENT
Start: 2023-09-06 | End: 2023-09-06 | Stop reason: SURG

## 2023-09-06 RX ORDER — ONDANSETRON 2 MG/ML
4 INJECTION INTRAMUSCULAR; INTRAVENOUS EVERY 8 HOURS PRN
Status: DISCONTINUED | OUTPATIENT
Start: 2023-09-06 | End: 2023-09-09 | Stop reason: HOSPADM

## 2023-09-06 RX ORDER — FAMOTIDINE 10 MG/ML
20 INJECTION, SOLUTION INTRAVENOUS ONCE
Status: DISCONTINUED | OUTPATIENT
Start: 2023-09-06 | End: 2023-09-06 | Stop reason: HOSPADM

## 2023-09-06 RX ORDER — FENTANYL CITRATE 50 UG/ML
INJECTION, SOLUTION INTRAMUSCULAR; INTRAVENOUS
Status: COMPLETED
Start: 2023-09-06 | End: 2023-09-06

## 2023-09-06 RX ORDER — GLYCOPYRROLATE 0.2 MG/ML
INJECTION INTRAMUSCULAR; INTRAVENOUS AS NEEDED
Status: DISCONTINUED | OUTPATIENT
Start: 2023-09-06 | End: 2023-09-06 | Stop reason: SURG

## 2023-09-06 RX ORDER — METRONIDAZOLE 500 MG/100ML
500 INJECTION, SOLUTION INTRAVENOUS ONCE
Status: DISCONTINUED | OUTPATIENT
Start: 2023-09-06 | End: 2023-09-06 | Stop reason: HOSPADM

## 2023-09-06 RX ORDER — SCOLOPAMINE TRANSDERMAL SYSTEM 1 MG/1
1 PATCH, EXTENDED RELEASE TRANSDERMAL CONTINUOUS
Status: ACTIVE | OUTPATIENT
Start: 2023-09-06 | End: 2023-09-09

## 2023-09-06 RX ORDER — ACETAMINOPHEN 325 MG/1
650 TABLET ORAL EVERY 6 HOURS
Status: DISCONTINUED | OUTPATIENT
Start: 2023-09-06 | End: 2023-09-06

## 2023-09-06 RX ORDER — MEPERIDINE HYDROCHLORIDE 25 MG/ML
INJECTION INTRAMUSCULAR; INTRAVENOUS; SUBCUTANEOUS
Status: COMPLETED
Start: 2023-09-06 | End: 2023-09-06

## 2023-09-06 RX ORDER — SODIUM CHLORIDE 9 MG/ML
40 INJECTION, SOLUTION INTRAVENOUS AS NEEDED
Status: DISCONTINUED | OUTPATIENT
Start: 2023-09-06 | End: 2023-09-06 | Stop reason: HOSPADM

## 2023-09-06 RX ORDER — ALVIMOPAN 12 MG/1
12 CAPSULE ORAL 2 TIMES DAILY
Status: DISCONTINUED | OUTPATIENT
Start: 2023-09-07 | End: 2023-09-09 | Stop reason: HOSPADM

## 2023-09-06 RX ORDER — SODIUM CHLORIDE 9 MG/ML
40 INJECTION, SOLUTION INTRAVENOUS AS NEEDED
Status: DISCONTINUED | OUTPATIENT
Start: 2023-09-06 | End: 2023-09-09 | Stop reason: HOSPADM

## 2023-09-06 RX ORDER — EPHEDRINE SULFATE 50 MG/ML
5 INJECTION, SOLUTION INTRAVENOUS ONCE AS NEEDED
Status: DISCONTINUED | OUTPATIENT
Start: 2023-09-06 | End: 2023-09-06 | Stop reason: HOSPADM

## 2023-09-06 RX ORDER — DEXAMETHASONE SODIUM PHOSPHATE 4 MG/ML
INJECTION, SOLUTION INTRA-ARTICULAR; INTRALESIONAL; INTRAMUSCULAR; INTRAVENOUS; SOFT TISSUE AS NEEDED
Status: DISCONTINUED | OUTPATIENT
Start: 2023-09-06 | End: 2023-09-06 | Stop reason: SURG

## 2023-09-06 RX ORDER — SODIUM CHLORIDE, SODIUM LACTATE, POTASSIUM CHLORIDE, CALCIUM CHLORIDE 600; 310; 30; 20 MG/100ML; MG/100ML; MG/100ML; MG/100ML
9 INJECTION, SOLUTION INTRAVENOUS CONTINUOUS
Status: DISCONTINUED | OUTPATIENT
Start: 2023-09-06 | End: 2023-09-06

## 2023-09-06 RX ORDER — METRONIDAZOLE 500 MG/100ML
INJECTION, SOLUTION INTRAVENOUS AS NEEDED
Status: DISCONTINUED | OUTPATIENT
Start: 2023-09-06 | End: 2023-09-06 | Stop reason: SURG

## 2023-09-06 RX ORDER — HEPARIN SODIUM 5000 [USP'U]/ML
5000 INJECTION, SOLUTION INTRAVENOUS; SUBCUTANEOUS EVERY 8 HOURS SCHEDULED
Status: DISCONTINUED | OUTPATIENT
Start: 2023-09-07 | End: 2023-09-09 | Stop reason: HOSPADM

## 2023-09-06 RX ORDER — ONDANSETRON 2 MG/ML
INJECTION INTRAMUSCULAR; INTRAVENOUS
Status: COMPLETED
Start: 2023-09-06 | End: 2023-09-06

## 2023-09-06 RX ORDER — ACETAMINOPHEN 325 MG/1
650 TABLET ORAL EVERY 6 HOURS
Status: DISCONTINUED | OUTPATIENT
Start: 2023-09-06 | End: 2023-09-08

## 2023-09-06 RX ORDER — LIDOCAINE HYDROCHLORIDE 10 MG/ML
INJECTION, SOLUTION EPIDURAL; INFILTRATION; INTRACAUDAL; PERINEURAL AS NEEDED
Status: DISCONTINUED | OUTPATIENT
Start: 2023-09-06 | End: 2023-09-06 | Stop reason: SURG

## 2023-09-06 RX ORDER — HYDROMORPHONE HCL IN 0.9% NACL 10 MG/50ML
PATIENT CONTROLLED ANALGESIA SYRINGE INTRAVENOUS
Status: COMPLETED
Start: 2023-09-06 | End: 2023-09-06

## 2023-09-06 RX ORDER — SODIUM CHLORIDE 0.9 % (FLUSH) 0.9 %
10 SYRINGE (ML) INJECTION AS NEEDED
Status: DISCONTINUED | OUTPATIENT
Start: 2023-09-06 | End: 2023-09-06 | Stop reason: HOSPADM

## 2023-09-06 RX ORDER — FENTANYL CITRATE 50 UG/ML
INJECTION, SOLUTION INTRAMUSCULAR; INTRAVENOUS AS NEEDED
Status: DISCONTINUED | OUTPATIENT
Start: 2023-09-06 | End: 2023-09-06 | Stop reason: SURG

## 2023-09-06 RX ORDER — DEXTROSE, SODIUM CHLORIDE, SODIUM LACTATE, POTASSIUM CHLORIDE, AND CALCIUM CHLORIDE 5; .6; .31; .03; .02 G/100ML; G/100ML; G/100ML; G/100ML; G/100ML
20 INJECTION, SOLUTION INTRAVENOUS CONTINUOUS
Status: DISCONTINUED | OUTPATIENT
Start: 2023-09-06 | End: 2023-09-08

## 2023-09-06 RX ORDER — LIDOCAINE HYDROCHLORIDE 10 MG/ML
0.5 INJECTION, SOLUTION EPIDURAL; INFILTRATION; INTRACAUDAL; PERINEURAL ONCE AS NEEDED
Status: COMPLETED | OUTPATIENT
Start: 2023-09-06 | End: 2023-09-06

## 2023-09-06 RX ORDER — BUPIVACAINE HYDROCHLORIDE 2.5 MG/ML
INJECTION, SOLUTION EPIDURAL; INFILTRATION; INTRACAUDAL
Status: COMPLETED | OUTPATIENT
Start: 2023-09-06 | End: 2023-09-06

## 2023-09-06 RX ORDER — ONDANSETRON 2 MG/ML
4 INJECTION INTRAMUSCULAR; INTRAVENOUS ONCE AS NEEDED
Status: COMPLETED | OUTPATIENT
Start: 2023-09-06 | End: 2023-09-06

## 2023-09-06 RX ORDER — AMOXICILLIN 250 MG
2 CAPSULE ORAL NIGHTLY
Status: DISCONTINUED | OUTPATIENT
Start: 2023-09-06 | End: 2023-09-06

## 2023-09-06 RX ORDER — CEFAZOLIN SODIUM IN 0.9 % NACL 3 G/100 ML
3000 INTRAVENOUS SOLUTION, PIGGYBACK (ML) INTRAVENOUS ONCE
Status: COMPLETED | OUTPATIENT
Start: 2023-09-06 | End: 2023-09-06

## 2023-09-06 RX ORDER — HEPARIN SODIUM 5000 [USP'U]/ML
5000 INJECTION, SOLUTION INTRAVENOUS; SUBCUTANEOUS ONCE
Status: COMPLETED | OUTPATIENT
Start: 2023-09-06 | End: 2023-09-06

## 2023-09-06 RX ORDER — HYDROMORPHONE HYDROCHLORIDE 1 MG/ML
0.5 INJECTION, SOLUTION INTRAMUSCULAR; INTRAVENOUS; SUBCUTANEOUS
Status: DISCONTINUED | OUTPATIENT
Start: 2023-09-06 | End: 2023-09-06 | Stop reason: HOSPADM

## 2023-09-06 RX ORDER — SODIUM CHLORIDE 0.9 % (FLUSH) 0.9 %
10 SYRINGE (ML) INJECTION EVERY 12 HOURS SCHEDULED
Status: DISCONTINUED | OUTPATIENT
Start: 2023-09-06 | End: 2023-09-06 | Stop reason: HOSPADM

## 2023-09-06 RX ORDER — SODIUM CHLORIDE 9 MG/ML
INJECTION, SOLUTION INTRAVENOUS AS NEEDED
Status: DISCONTINUED | OUTPATIENT
Start: 2023-09-06 | End: 2023-09-06 | Stop reason: HOSPADM

## 2023-09-06 RX ORDER — SODIUM CHLORIDE 0.9 % (FLUSH) 0.9 %
10 SYRINGE (ML) INJECTION EVERY 12 HOURS SCHEDULED
Status: DISCONTINUED | OUTPATIENT
Start: 2023-09-06 | End: 2023-09-09 | Stop reason: HOSPADM

## 2023-09-06 RX ORDER — PHENYLEPHRINE HCL IN 0.9% NACL 1 MG/10 ML
SYRINGE (ML) INTRAVENOUS AS NEEDED
Status: DISCONTINUED | OUTPATIENT
Start: 2023-09-06 | End: 2023-09-06 | Stop reason: SURG

## 2023-09-06 RX ORDER — ONDANSETRON 2 MG/ML
INJECTION INTRAMUSCULAR; INTRAVENOUS AS NEEDED
Status: DISCONTINUED | OUTPATIENT
Start: 2023-09-06 | End: 2023-09-06 | Stop reason: SURG

## 2023-09-06 RX ORDER — PREGABALIN 75 MG/1
75 CAPSULE ORAL ONCE
Status: COMPLETED | OUTPATIENT
Start: 2023-09-06 | End: 2023-09-06

## 2023-09-06 RX ORDER — METOCLOPRAMIDE HYDROCHLORIDE 5 MG/ML
10 INJECTION INTRAMUSCULAR; INTRAVENOUS EVERY 6 HOURS
Status: DISCONTINUED | OUTPATIENT
Start: 2023-09-06 | End: 2023-09-09 | Stop reason: HOSPADM

## 2023-09-06 RX ORDER — DEXAMETHASONE SODIUM PHOSPHATE 10 MG/ML
INJECTION, SOLUTION INTRAMUSCULAR; INTRAVENOUS
Status: COMPLETED | OUTPATIENT
Start: 2023-09-06 | End: 2023-09-06

## 2023-09-06 RX ADMIN — ROCURONIUM BROMIDE 10 MG: 10 SOLUTION INTRAVENOUS at 09:22

## 2023-09-06 RX ADMIN — SODIUM CHLORIDE, POTASSIUM CHLORIDE, SODIUM LACTATE AND CALCIUM CHLORIDE: 600; 310; 30; 20 INJECTION, SOLUTION INTRAVENOUS at 09:45

## 2023-09-06 RX ADMIN — DEXAMETHASONE SODIUM PHOSPHATE 4 MG: 4 INJECTION, SOLUTION INTRAMUSCULAR; INTRAVENOUS at 08:10

## 2023-09-06 RX ADMIN — Medication 100 MCG: at 08:01

## 2023-09-06 RX ADMIN — INDOCYANINE GREEN AND WATER 12.5 MG: KIT at 09:23

## 2023-09-06 RX ADMIN — METRONIDAZOLE 500 MG: 500 INJECTION, SOLUTION INTRAVENOUS at 07:52

## 2023-09-06 RX ADMIN — Medication 100 MCG: at 09:45

## 2023-09-06 RX ADMIN — HYDROMORPHONE HYDROCHLORIDE 0.5 MG: 1 INJECTION, SOLUTION INTRAMUSCULAR; INTRAVENOUS; SUBCUTANEOUS at 11:52

## 2023-09-06 RX ADMIN — Medication 100 MCG: at 08:43

## 2023-09-06 RX ADMIN — ONDANSETRON 4 MG: 2 INJECTION INTRAMUSCULAR; INTRAVENOUS at 15:09

## 2023-09-06 RX ADMIN — ACETAMINOPHEN 1000 MG: 500 TABLET ORAL at 07:13

## 2023-09-06 RX ADMIN — PROPOFOL 200 MG: 10 INJECTION, EMULSION INTRAVENOUS at 07:39

## 2023-09-06 RX ADMIN — HYDROMORPHONE HYDROCHLORIDE 0.5 MG: 1 INJECTION, SOLUTION INTRAMUSCULAR; INTRAVENOUS; SUBCUTANEOUS at 12:26

## 2023-09-06 RX ADMIN — ONDANSETRON 4 MG: 2 INJECTION INTRAMUSCULAR; INTRAVENOUS at 10:42

## 2023-09-06 RX ADMIN — PROPOFOL 200 MG: 10 INJECTION, EMULSION INTRAVENOUS at 07:41

## 2023-09-06 RX ADMIN — Medication 100 MCG: at 10:34

## 2023-09-06 RX ADMIN — FENTANYL CITRATE 50 MCG: 50 INJECTION, SOLUTION INTRAMUSCULAR; INTRAVENOUS at 12:00

## 2023-09-06 RX ADMIN — SUGAMMADEX 200 MG: 100 INJECTION, SOLUTION INTRAVENOUS at 10:57

## 2023-09-06 RX ADMIN — SCOPALAMINE 1 PATCH: 1 PATCH, EXTENDED RELEASE TRANSDERMAL at 07:14

## 2023-09-06 RX ADMIN — ROCURONIUM BROMIDE 30 MG: 10 SOLUTION INTRAVENOUS at 08:01

## 2023-09-06 RX ADMIN — Medication: at 14:55

## 2023-09-06 RX ADMIN — FENTANYL CITRATE 50 MCG: 50 INJECTION, SOLUTION INTRAMUSCULAR; INTRAVENOUS at 11:39

## 2023-09-06 RX ADMIN — INDOCYANINE GREEN AND WATER 12.5 MG: KIT at 10:11

## 2023-09-06 RX ADMIN — LIDOCAINE HYDROCHLORIDE 50 MG: 10 INJECTION, SOLUTION EPIDURAL; INFILTRATION; INTRACAUDAL; PERINEURAL at 07:39

## 2023-09-06 RX ADMIN — PREGABALIN 75 MG: 75 CAPSULE ORAL at 07:13

## 2023-09-06 RX ADMIN — ONDANSETRON 4 MG: 2 INJECTION INTRAMUSCULAR; INTRAVENOUS at 17:18

## 2023-09-06 RX ADMIN — FAMOTIDINE 20 MG: 20 TABLET, FILM COATED ORAL at 06:56

## 2023-09-06 RX ADMIN — SENNOSIDES AND DOCUSATE SODIUM 2 TABLET: 8.6; 5 TABLET ORAL at 22:13

## 2023-09-06 RX ADMIN — Medication 100 MCG: at 10:28

## 2023-09-06 RX ADMIN — FENTANYL CITRATE 100 MCG: 50 INJECTION, SOLUTION INTRAMUSCULAR; INTRAVENOUS at 07:39

## 2023-09-06 RX ADMIN — ROCURONIUM BROMIDE 10 MG: 10 SOLUTION INTRAVENOUS at 10:00

## 2023-09-06 RX ADMIN — Medication 100 MCG: at 09:08

## 2023-09-06 RX ADMIN — SODIUM CHLORIDE, SODIUM LACTATE, POTASSIUM CHLORIDE, CALCIUM CHLORIDE AND DEXTROSE MONOHYDRATE 100 ML/HR: 5; 600; 310; 30; 20 INJECTION, SOLUTION INTRAVENOUS at 15:56

## 2023-09-06 RX ADMIN — FENTANYL CITRATE 50 MCG: 50 INJECTION, SOLUTION INTRAMUSCULAR; INTRAVENOUS at 13:09

## 2023-09-06 RX ADMIN — Medication 100 MCG: at 10:04

## 2023-09-06 RX ADMIN — SODIUM CHLORIDE, POTASSIUM CHLORIDE, SODIUM LACTATE AND CALCIUM CHLORIDE 9 ML/HR: 600; 310; 30; 20 INJECTION, SOLUTION INTRAVENOUS at 07:14

## 2023-09-06 RX ADMIN — Medication 100 MCG: at 08:52

## 2023-09-06 RX ADMIN — Medication 10 ML: at 20:20

## 2023-09-06 RX ADMIN — ROCURONIUM BROMIDE 50 MG: 10 SOLUTION INTRAVENOUS at 07:39

## 2023-09-06 RX ADMIN — MELOXICAM 15 MG: 15 TABLET ORAL at 07:14

## 2023-09-06 RX ADMIN — FENTANYL CITRATE 75 MCG: 50 INJECTION, SOLUTION INTRAMUSCULAR; INTRAVENOUS at 10:59

## 2023-09-06 RX ADMIN — LIDOCAINE HYDROCHLORIDE 0.5 ML: 10 INJECTION, SOLUTION EPIDURAL; INFILTRATION; INTRACAUDAL; PERINEURAL at 07:14

## 2023-09-06 RX ADMIN — Medication 100 MCG: at 08:28

## 2023-09-06 RX ADMIN — FENTANYL CITRATE 25 MCG: 50 INJECTION, SOLUTION INTRAMUSCULAR; INTRAVENOUS at 10:54

## 2023-09-06 RX ADMIN — CEFAZOLIN 2000 MG: 10 INJECTION, POWDER, FOR SOLUTION INTRAVENOUS at 07:35

## 2023-09-06 RX ADMIN — DEXAMETHASONE SODIUM PHOSPHATE 4 MG: 10 INJECTION, SOLUTION INTRAMUSCULAR; INTRAVENOUS at 07:35

## 2023-09-06 RX ADMIN — PROPOFOL 25 MCG/KG/MIN: 10 INJECTION, EMULSION INTRAVENOUS at 07:50

## 2023-09-06 RX ADMIN — METOCLOPRAMIDE 10 MG: 5 INJECTION, SOLUTION INTRAMUSCULAR; INTRAVENOUS at 20:19

## 2023-09-06 RX ADMIN — Medication 100 MCG: at 07:49

## 2023-09-06 RX ADMIN — BUPIVACAINE HYDROCHLORIDE 60 ML: 2.5 INJECTION, SOLUTION EPIDURAL; INFILTRATION; INTRACAUDAL; PERINEURAL at 07:35

## 2023-09-06 RX ADMIN — MEPERIDINE HYDROCHLORIDE 12.5 MG: 25 INJECTION INTRAMUSCULAR; INTRAVENOUS; SUBCUTANEOUS at 11:36

## 2023-09-06 RX ADMIN — HEPARIN SODIUM 5000 UNITS: 5000 INJECTION INTRAVENOUS; SUBCUTANEOUS at 07:18

## 2023-09-06 RX ADMIN — GLYCOPYRROLATE 0.2 MG: 0.4 INJECTION INTRAMUSCULAR; INTRAVENOUS at 08:05

## 2023-09-06 RX ADMIN — ROCURONIUM BROMIDE 20 MG: 10 SOLUTION INTRAVENOUS at 09:02

## 2023-09-06 RX ADMIN — ACETAMINOPHEN 650 MG: 325 TABLET ORAL at 22:30

## 2023-09-06 RX ADMIN — ROCURONIUM BROMIDE 20 MG: 10 SOLUTION INTRAVENOUS at 09:44

## 2023-09-06 RX ADMIN — ROCURONIUM BROMIDE 20 MG: 10 SOLUTION INTRAVENOUS at 08:43

## 2023-09-06 NOTE — PROGRESS NOTES
"Patient Name:  Tank Tejeda  YOB: 1970  [id number]    Surgery Post - Operative Note    Date of visit: 9/6/2023      Subjective: Resting in bed.  Pain is controlled.  Vitals are stable.  Has some numbness and tingling in the right forearm that extends to the right hand in the lateral 3 digits.  Some mild nausea but no episodes of emesis.        Objective:    /83   Pulse 79   Temp 97 °F (36.1 °C) (Temporal)   Resp 12   Ht 185.4 cm (72.99\")   Wt 120 kg (264 lb)   SpO2 94%   BMI 34.84 kg/m²     CV:  Regular rate and rhythm   L:  Clear to auscultation bilaterally. Not labored on O2 by NC   ABD:  Soft, appropriately tender. Dressings clean, dry and intact   EXT:  No cyanosis, clubbing or edema        Assessment/ Plan:     Recovering well after laparoscopic right colectomy. Continue Pulmonary toilet        Rinku Urrutia MD  9/6/2023  15:11 EDT    "

## 2023-09-06 NOTE — ANESTHESIA PROCEDURE NOTES
Airway  Urgency: elective    Date/Time: 9/6/2023 7:42 AM  Airway not difficult (2 attempts. grade 3 with DL and grade 1 with videoscope)    General Information and Staff    Patient location during procedure: OR    Indications and Patient Condition  Indications for airway management: airway protection    Preoxygenated: yes  MILS maintained throughout  Mask difficulty assessment: 1 - vent by mask    Final Airway Details  Final airway type: endotracheal airway      Successful airway: ETT  Cuffed: yes   Successful intubation technique: video laryngoscopy  Facilitating devices/methods: intubating stylet  Blade: [place]  Blade size: 4  ETT size (mm): 7.5  Cormack-Lehane Classification: grade I - full view of glottis  Placement verified by: chest auscultation and capnometry   Cuff volume (mL): 6  Measured from: teeth  ETT/EBT  to teeth (cm): 25  Number of attempts at approach: 2  Assessment: lips, teeth, and gum same as pre-op and atraumatic intubation

## 2023-09-06 NOTE — H&P
[facility] PREOPERATIVE HISTORY AND PHYSICAL       Chief complaint:  Colon polyp    Subjective:    Patient is a 53 y.o.male presents with history of appendiceal orifice polyp.  The patient underwent screening colonoscopy with Dr. Hernandez on 4/26/23 kaushik t demonstrated evidence of a 30mm polyp at the appendiceal orifice that was not amendable to endoscopic resection but was biopsied.  Pathology showed fragments of adenomatous polyp.  No prior history of polyps, malignancy or abdominal surgery.  See office note dated 5/15/23.  The patient is here today for scheduled and consented COLON RESECTION LAPAROSCOPIC RIGHT WITH DAVINCI ROBOT by Dr. Urrutia.     Associated Documents:  ASSESSMENT/PATIENT HISTORY - SCAN - [facility]/ASSESSMENT/05/15/2023 (05/15/2023)       Review of Systems:  General ROS: negative for fever, chills, weakness, dizziness, headache, fatigue, weight changes  Cardiovascular ROS: no chest pain or dyspnea on exertion  Respiratory ROS: no cough, shortness of breath, or wheezing  GI ROS: no abdominal pain/discomfort, nausea, vomiting or diarrhea   ROS: no dysuria, hematuria or complaints  Skin ROS: no itching, rash or open wounds.        Allergies:   Allergies   Allergen Reactions    Trazodone Anaphylaxis    Wellbutrin [Bupropion] Other (See Comments)     Panic attacks     Latex: no known allergy  Contrast Dye:  no known allergy      Home Meds    Medications Prior to Admission   Medication Sig Dispense Refill Last Dose    Diclofenac Sodium (VOLTAREN) 1 % gel gel Apply 4 g topically to the appropriate area as directed 2 (Two) Times a Day. 100 g 2 Past Week    multivitamin with minerals tablet tablet Take 1 tablet by mouth Daily.   Past Week    valACYclovir (Valtrex) 500 MG tablet Take 1 tablet by mouth Daily. (Patient taking differently: Take 1 tablet by mouth As Needed.) 30 tablet 3      PMH:   Past Medical History:   Diagnosis Date    Allergic     Anxiety always    Arthritis     Bell's palsy  "    2018    Depression     Diabetes mellitus     GERD (gastroesophageal reflux disease) 2007    Hyperlipidemia     Pneumonia 1994    Seasonal allergies     Substance abuse 2000    Tremor 2007    Visual impairment      PSH:    Past Surgical History:   Procedure Laterality Date    COLONOSCOPY      CYST REMOVAL Right      Immunization History:   Immunization History   Administered Date(s) Administered    COVID-19 (MODERNA) 1st,2nd,3rd Dose Monovalent 2021, 2021   Pneumococcal=no   Influenza=no  Tetanus=unknown     Social History:  Social History     Tobacco Use    Smoking status: Former     Packs/day: 1.00     Years: 27.00     Pack years: 27.00     Types: Cigarettes     Quit date: 6/15/2018     Years since quittin.2     Passive exposure: Past    Smokeless tobacco: Never   Substance Use Topics    Alcohol use: No           Physical Exam:/100 (BP Location: Right arm, Patient Position: Sitting)   Pulse 69   Temp 97.5 °F (36.4 °C) (Temporal)   Resp 16   Ht 185.4 cm (72.99\")   Wt 120 kg (264 lb)   SpO2 98%   BMI 34.84 kg/m²       General Appearance:    Alert, cooperative, no distress, appears stated age   Head:    Normocephalic, without obvious abnormality, atraumatic   Lungs:     Clear to auscultation bilaterally, respirations unlabored    Heart: Regular rate and rhythm, S1 and S2 normal, no murmur, rub    or gallop    Abdomen:    Soft without tenderness  +bowel sounds   Breast Exam:    deferred   Genitalia:    deferred   Extremities:   Extremities normal, atraumatic, no cyanosis or edema   Skin:   Skin color, texture, turgor normal, no rashes or lesions   Neurologic:   Grossly intact     Results Review:   LABS:  Lab Results   Component Value Date    WBC 7.43 2023    HGB 16.6 2023    HCT 51.5 (H) 2023    MCV 92.3 2023     2023    NEUTROABS 4.07 2021    GLUCOSE 129 (H) 2023    BUN 17 2023    CREATININE 1.24 2023    EGFRIFAFRI 91 " 11/16/2021     08/28/2023    K 4.2 08/28/2023     08/28/2023    CO2 25.0 08/28/2023    MG 1.9 02/05/2019    CALCIUM 9.5 08/28/2023    ALBUMIN 4.2 03/27/2023    AST 24 03/27/2023    ALT 35 03/27/2023    BILITOT 0.5 03/27/2023       RADIOLOGY:  Imaging Results (Last 72 Hours)       ** No results found for the last 72 hours. **             Cancer Staging (if applicable):  Cancer Patient: __ yes __no __unknown; If yes, clinical stage T:__ N:__M:__, stage group    Impression:  Polyp of Colon    Plan:  COLON RESECTION LAPAROSCOPIC RIGHT WITH DAVINCI ROBOT     DANIEL Wyatt 9/6/2023 07:06 EDT

## 2023-09-06 NOTE — ANESTHESIA POSTPROCEDURE EVALUATION
Patient: Tank Tejeda    Procedure Summary       Date: 09/06/23 Room / Location:  [facility]  /  [facility]    Anesthesia Start: 0732 Anesthesia Stop: 1121    Procedure: COLON RESECTION LAPAROSCOPIC RIGHT WITH DAVINCI ROBOT (Abdomen) Diagnosis:     Surgeons: Rinku Urrutia MD Provider: Kelby Adams MD    Anesthesia Type: general ASA Status: 3            Anesthesia Type: general    Vitals  Vitals Value Taken Time   /89 09/06/23 1119   Temp     Pulse 87 09/06/23 1120   Resp     SpO2 97 % 09/06/23 1120   Vitals shown include unvalidated device data.        Post Anesthesia Care and Evaluation    Patient location during evaluation: PACU  Patient participation: complete - patient participated  Level of consciousness: awake and alert  Pain score: 0  Pain management: adequate    Airway patency: patent  Anesthetic complications: No anesthetic complications  PONV Status: none  Cardiovascular status: hemodynamically stable and acceptable  Respiratory status: nonlabored ventilation, acceptable and nasal cannula  Hydration status: acceptable

## 2023-09-06 NOTE — NURSING NOTE
Late entry- Around 1130 patient c/o numbness to right arm and hand. Radial pulse palpable and cap refill less than 3 sec. BP cuff moved to left arm.     Late entry- 1237- Dr. Adams notified of continued numbness to right arm. MD stated would come to bedside.     Late entry 1250- Dr. Adams at the bedside evaluating patient. Patient continues to c/o numbness to right hand. Radial pulse remains palpable and cap refill less than 3 sec.

## 2023-09-06 NOTE — OP NOTE
Operative Report    Patient Name:  Tank Tejeda  YOB: 1970  [id number]    9/6/2023      PREOPERATIVE DIAGNOSIS: Endoscopically unresectable polyp at the appendiceal orifice      POSTOPERATIVE DIAGNOSIS: Same        PROCEDURE PERFORMED:     Robotic Assisted Laparoscopic Right Colectomy       SURGEON: Rinku Urrutia MD      ASSISTANT: Assistant: Won Crowder PA-C      Assistant responsibilities: Won assisted with dissection, retraction, exchange of instruments and camera, extraction of the specimen, and closure.  His assistance was necessary and required for successful completion of the case    Colon Resection  Operation performed with curative intent Yes   Tumor Location (select all that apply) Cecum   Extent of colon and vascular resection  (select all that apply) Right hemicolectomy - ileocolic, right colic (if present)            SPECIMENS: Right colon        ANESTHESIA: General with TAP blocks.       ESTIMATED BLOOD LOSS: 50 mL       FINDINGS:  1.  No obvious masses or other pathology identified on laparoscopic exam.  A right hemicolectomy was performed with a primary isoperistaltic ileocolic anastomosis between the terminal ileum and proximal transverse colon.  Firefly visualization with ICG intravenous injection demonstrated excellent perfusion of the anastomosis at its completion.         INDICATIONS:      This patient is a 53 y.o. male with a history of endoscopically unresectable polyp of the appendiceal orifice.  He was recommended to undergo definitive surgical resection.. The risks, benefits, and alternatives of the procedure were discussed with the patient and their family preoperatively and they agreed to proceed.          DESCRIPTION OF PROCEDURE:       After obtaining informed consent, the patient was taken to the operating room and placed in the supine position on the operating room table. General anesthesia was initiated. A Perea catheter was placed. The abdomen was prepped  and draped in the usual sterile fashion. A time-out was properly performed. Antibiotics were given preoperatively. SCDs were properly placed on the patient and turned on.  A block was performed by the anesthesia service prior to start of case.     A skin incision was then created in the left upper quadrant at Hinkle's point with an 11 blade scalpel.  Next utilizing a 5 mm 0 degree laparoscope as well as a 5 mm Optiview trocar, the abdomen was entered in the left upper quadrant at Hinkle's point under direct laparoscopic visualization utilizing Optiview technique.  Pneumoperitoneum was established to 15 mmHg.  The abdomen was surveyed with special attention the viscera underlying the insertion site which was found to be free of injury.  Next under direct laparoscopic visualization, three 8 mm robotic trocars were placed in a line from the left costal margin to the right ASIS.  The 5 mm trocar was exchanged for a 12 mm robotic trocar.  A 5 mm trocar for the assistant was then placed in the left lower quadrant.  All of the trocars were placed under direct laparoscopic visualization.    Next utilizing laparoscopic bowel graspers, the omentum was reflected superiorly and the small bowel was placed on the left side of the abdomen.  There was no obvious evidence of a palpable mass or deformity of the colon. There was no obvious evidence of metastatic disease or peritoneal implants throughout the abdomen.  The anterior surface of both the right and left lobe of the liver were carefully examined and there was no evidence of masses or metastatic disease. The da Andrew Xi robotic system was then brought to the patient's bedside and targeted for the right upper quadrant.                The fold of Treves was grasped and elevated, and the ileocolic pedicle was carefully identified.  Using cautery scissors as well as the vessel sealer device, the peritoneum overlying the mesentery was incised.  We then proceeded with dissection in  a medial to lateral fashion.  The ileocolic pedicle was elevated and we proceeded superiorly,  the right colon mesentery from the retroperitoneum.  The duodenum was carefully identified, left posteriorly and protected.  With the duodenum clearly visualized and carefully protected, the ileocolic pedicle was then skeletonized.  A high ligation of the ileocolic pedicle was then performed using a single firing of a da Andrew robotic 60 mm white load stapler.  Hemostasis was confirmed.  We then continued with our dissection proceeding superiorly in a medial to lateral fashion  the right colon mesentery from the retroperitoneum.  Following this we then turned our attention to the lateral attachments of the right colon.  These were taken down using a combination of cautery scissors as well as the vessel sealer device.    The cecum  from the right abdominal and pelvic sidewall without difficulty.  The lateral attachments of the right colon were taken down proceeding superiorly until we met our previous medial dissection plane.  We then freed the small bowel mesentery carefully from the retroperitoneum so that the entire distal small bowel and cecum could be brought superiorly. .  Finally we incised the gastrocolic ligament laterally towards the proximal transverse colon using the vessel sealer device.  With this completed the hepatic flexure of the colon had been taken down..  At this point the entire right colon was well mobilized.     We then identified an appropriate site of the proximal transverse colon for our division.  Any remaining portions of the right colon mesentery were divided using the vessel sealer device.  We then skeletonized the mesentery proceeding to this portion of the proximal transverse colon.  ICG was then injected intravenously, and firefly visualization was used to confirm adequate perfusion of the colon at this site.  At this point the proximal transverse colon was  divided at this site using a firing of a da Andrew 60 mm green load stapler.  We then turned our attention to the terminal ileum.  We divided the mesentery of the small bowel proceeding towards the terminal ileum at least 10 cm away from the ileocecal valve.  This dissection was carried all the way to the wall of the small bowel.  The small bowel was then divided at this site using an additional firing of the da Andrew 60 mm blue load stapler.  At this point the specimen was completely mobilized and  from surrounding structures, and was placed above the right lobe of the liver for the remainder of the case.     The divided end of the terminal ileum and transverse colon were found to lay in close proximity without any tension in an isoperistaltic fashion.  There was no twisting of the mesentery.  We then proceeded forward with our primary ileocolic anastomosis.  An enterotomy was created just off the staple line on the small bowel, and similarly an enterotomy was created on the transverse colon approximately 60 cm proximal to the staple line.  A 60 mm blue load Andrew stapler was then brought onto the abdomen, and the 2 limbs of the stapler were introduced through each enterotomy.  A common channel for the anastomosis was then created using a single firing of the 60 mm stapler.  The stapler was then removed.  Stay sutures of 0 Vicryl were then placed on the 2 ends of the open portion of the common channel.  The common channel was then closed using a running 3-0 absorbable strata fix suture in 2 layers, inner layer of full-thickness sutures and outer layer in a Lembert fashion..  The anastomosis was carefully examined and found to be widely patent and hemostatic.  ICG was then injected intravenously, and firefly visualization confirmed excellent perfusion of the small bowel and proximal transverse colon throughout the anastomosis.  The mesenteric defect was left open.     We again surveyed the abdomen and  confirmed hemostasis.  At this point the infraumbilical trocar site skin incision was then extended, and dissection was carried down through the skin and subcutaneous tissues using Bovie electrocautery until the abdominal cavity was entered.  The fascial incision was extended.  A small Kishor wound retractor was placed, and the specimen was extracted through this site.  The specimen was passed off the field as right colon.  Hemostasis of the subcutaneous wound was confirmed.  The fascia was then closed with interrupted figure-of-eight 0 PDS suture.  Laparoscopic examination throughout the abdomen confirmed that the anastomosis lied in appropriate position and was hemostatic and that our fascial closure was adequate.  The fascia of the 12 mm trocar site was then closed under direct laparoscopic visualization utilizing a Jose Guadalupe-Angle device with an 0 Vicryl suture.  All the remaining trocars were then removed under direct visualization and pneumoperitoneum was released.                 All the skin incisions were then closed using 4-0 Monocryl in the subcuticular layer.  Mastisol and Steri-Strips then applied to the wound site with a clean dry dressing overlying this.                 After the procedure, the patient was awakened, extubated, and taken to the postoperative anesthesia care unit for recovery. All needle, instrument, and lap counts were correct. I was personally present and performed all portions of the procedure. There were no immediate complications            Rinku Urrutia MD  9/6/2023  11:08 EDT

## 2023-09-06 NOTE — ANESTHESIA PROCEDURE NOTES
Peripheral Block      Patient reassessed immediately prior to procedure    Patient location during procedure: OR  Start time: 9/6/2023 7:35 AM  Reason for block: at surgeon's request and post-op pain management  Performed by  BERNARDO/CAA: Edison Regalado CRNA  Preanesthetic Checklist  Completed: patient identified, IV checked, site marked, risks and benefits discussed, surgical consent, monitors and equipment checked, pre-op evaluation and timeout performed  Prep:  Pt Position: supine  Sterile barriers:cap, gloves, mask, washed/disinfected hands and alcohol skin prep  Prep: ChloraPrep  Patient monitoring: blood pressure monitoring, continuous pulse oximetry and EKG  Procedure    Sedation: yes  Performed under: general  Guidance:ultrasound guided    ULTRASOUND INTERPRETATION.  Using ultrasound guidance a 20 G gauge needle was placed in close proximity to the nerve, at which point, under ultrasound guidance anesthetic was injected in the area of the nerve and spread of the anesthesia was seen on ultrasound in close proximity thereto.  There were no abnormalities seen on ultrasound; a digital image was taken; and the patient tolerated the procedure with no complications. Images:still images obtained, printed/placed on chart    Laterality:Bilateral  Block Type:TAP  Injection Technique:single-shot  Needle Type:short-bevel and echogenic  Needle Gauge:20 G  Resistance on Injection: none    Medications Used: bupivacaine PF (MARCAINE) 0.25 % injection - Injection   60 mL - 9/6/2023 7:35:00 AM  dexamethasone sodium phosphate injection - Injection   4 mg - 9/6/2023 7:35:00 AM      Medications  Comment:Block Injection:  LA dose divided between Right and Left block        Post Assessment  Injection Assessment: negative aspiration for heme, incremental injection and no paresthesia on injection  Patient Tolerance:comfortable throughout block  Complications:no  Additional Notes    Subcostal TAPs    A high-frequency linear transducer,  "with sterile cover, was placed sub-xiphoid to identify Linea Alba, right and left Rectus Abdominus Muscles (CARL). The transducer was moved either right or left subcostally to identify the CARL and the Transverse Abdominus Muscle (CASEY). The insertion site was prepped in sterile fashion and then localized with 2-5 ml of 1% Lidocaine. Using ultrasound-guidance, a 20-gauge B-Blas 4\" Ultraplex 360 non-stimulating echogenic needle was advanced in plane, from medial to lateral, until the tip of the needle was in the fascial plane between the CARL and CASEY. 1-3ml of preservative free normal saline was used to hydro-dissect the fascial planes. After the fascial plane was verified, the local anesthetic (LA) was injected. The procedure was repeated on the opposite side for bilateral coverage. Aspiration every 5 ml to prevent intravascular injection. Injection was completed with negative aspiration of blood and negative intravascular injection. Injection pressures were normal with minimal resistance. The subcostal approach to the TAP nerve block ideally anesthetizes the intercostal nerves T6-T9.     Mid-Axillary/Lateral TAPs    A high-frequency linear transducer, with sterile cover, was placed in the midaxillary line between the ASIS and costal margin. The External Oblique Muscle (EOM), Internal Oblique Muscle (IOM), Transverse Abdominus Muscle (CASEY), and Peritoneum were identified. The insertion site was prepped in sterile fashion and then localized with 2-5 ml of 1% Lidocaine. Using ultrasound-guidance, a 20-gauge B-Blas 4\" Ultraplex 360 non-stimulating echogenic needle was advanced in plane, from medial to lateral, until the tip of the needle was in the fascial plane between the IOM and CASEY. 1-3ml of preservative free normal saline was used to hydro-dissect the fascial planes. After the fascial plane was verified, the local anesthetic (LA) was injected. The procedure was repeated on the opposite side for bilateral coverage. " Aspiration every 5 ml to prevent intravascular injection. Injection was completed with negative aspiration of blood and negative intravascular injection. Injection pressures were normal with minimal resistance. Midaxillary TAPs should reach intercostal nerves T10- T11 and the subcostal nerve T12.

## 2023-09-06 NOTE — BRIEF OP NOTE
COLON RESECTION LAPAROSCOPIC RIGHT WITH DAVINCI ROBOT  Progress Note    Tank Tejeda  9/6/2023    Pre-op Diagnosis:   Endoscopically unresectable polyp of the appendiceal orifice       Post-Op Diagnosis Codes:  Same    Procedure/CPT® Codes:        Procedure(s):  COLON RESECTION LAPAROSCOPIC RIGHT WITH DAVINCI ROBOT              Surgeon(s):  Rinku Urrutia MD    Anesthesia: General with Block    Staff:   Circulator: Cathy Fields RN  Scrub Person: Faith Swanson; Geena Gayle  Nursing Assistant: Suri White CNA  Assistant: Won Crowder PA-C  Assistant: Won Crowder PA-C      Estimated Blood Loss: 50 mL    Urine Voided: 145 mL    Specimens:   Right colon                       Drains:   Urethral Catheter Silicone 16 Fr. (Active)       Findings: No obvious masses.  Right colectomy performed with a primary isoperistaltic ileocolic to proximal transverse colon anastomosis        Complications: No immediate    Assistant: Won Crowder PA-C  was responsible for performing the following activities: Retraction, Suction, Irrigation, Suturing, and Closing and their skilled assistance was necessary for the success of this case.    Rinku Urrutia MD     Date: 9/6/2023  Time: 11:02 EDT

## 2023-09-07 LAB
ANION GAP SERPL CALCULATED.3IONS-SCNC: 11 MMOL/L (ref 5–15)
BASOPHILS # BLD AUTO: 0.02 10*3/MM3 (ref 0–0.2)
BASOPHILS NFR BLD AUTO: 0.2 % (ref 0–1.5)
BUN SERPL-MCNC: 12 MG/DL (ref 6–20)
BUN/CREAT SERPL: 9.4 (ref 7–25)
CALCIUM SPEC-SCNC: 8.9 MG/DL (ref 8.6–10.5)
CHLORIDE SERPL-SCNC: 98 MMOL/L (ref 98–107)
CO2 SERPL-SCNC: 27 MMOL/L (ref 22–29)
CREAT SERPL-MCNC: 1.27 MG/DL (ref 0.76–1.27)
CYTO UR: NORMAL
DEPRECATED RDW RBC AUTO: 44 FL (ref 37–54)
EGFRCR SERPLBLD CKD-EPI 2021: 68.4 ML/MIN/1.73
EOSINOPHIL # BLD AUTO: 0 10*3/MM3 (ref 0–0.4)
EOSINOPHIL NFR BLD AUTO: 0 % (ref 0.3–6.2)
ERYTHROCYTE [DISTWIDTH] IN BLOOD BY AUTOMATED COUNT: 13.3 % (ref 12.3–15.4)
GLUCOSE SERPL-MCNC: 137 MG/DL (ref 65–99)
HCT VFR BLD AUTO: 45.5 % (ref 37.5–51)
HGB BLD-MCNC: 15.2 G/DL (ref 13–17.7)
IMM GRANULOCYTES # BLD AUTO: 0.05 10*3/MM3 (ref 0–0.05)
IMM GRANULOCYTES NFR BLD AUTO: 0.4 % (ref 0–0.5)
LAB AP CASE REPORT: NORMAL
LAB AP CLINICAL INFORMATION: NORMAL
LYMPHOCYTES # BLD AUTO: 1.4 10*3/MM3 (ref 0.7–3.1)
LYMPHOCYTES NFR BLD AUTO: 10.9 % (ref 19.6–45.3)
MAGNESIUM SERPL-MCNC: 1.9 MG/DL (ref 1.6–2.6)
MCH RBC QN AUTO: 30.2 PG (ref 26.6–33)
MCHC RBC AUTO-ENTMCNC: 33.4 G/DL (ref 31.5–35.7)
MCV RBC AUTO: 90.5 FL (ref 79–97)
MONOCYTES # BLD AUTO: 1.34 10*3/MM3 (ref 0.1–0.9)
MONOCYTES NFR BLD AUTO: 10.4 % (ref 5–12)
NEUTROPHILS NFR BLD AUTO: 10.03 10*3/MM3 (ref 1.7–7)
NEUTROPHILS NFR BLD AUTO: 78.1 % (ref 42.7–76)
NRBC BLD AUTO-RTO: 0 /100 WBC (ref 0–0.2)
PATH REPORT.FINAL DX SPEC: NORMAL
PATH REPORT.GROSS SPEC: NORMAL
PHOSPHATE SERPL-MCNC: 3.7 MG/DL (ref 2.5–4.5)
PLAT MORPH BLD: NORMAL
PLATELET # BLD AUTO: 155 10*3/MM3 (ref 140–450)
PMV BLD AUTO: 13.2 FL (ref 6–12)
POTASSIUM SERPL-SCNC: 3.9 MMOL/L (ref 3.5–5.2)
RBC # BLD AUTO: 5.03 10*6/MM3 (ref 4.14–5.8)
RBC MORPH BLD: NORMAL
SODIUM SERPL-SCNC: 136 MMOL/L (ref 136–145)
WBC MORPH BLD: NORMAL
WBC NRBC COR # BLD: 12.84 10*3/MM3 (ref 3.4–10.8)

## 2023-09-07 PROCEDURE — 85025 COMPLETE CBC W/AUTO DIFF WBC: CPT | Performed by: SURGERY

## 2023-09-07 PROCEDURE — 25010000002 METOCLOPRAMIDE PER 10 MG: Performed by: SURGERY

## 2023-09-07 PROCEDURE — 83735 ASSAY OF MAGNESIUM: CPT | Performed by: SURGERY

## 2023-09-07 PROCEDURE — 85007 BL SMEAR W/DIFF WBC COUNT: CPT | Performed by: SURGERY

## 2023-09-07 PROCEDURE — 25010000002 HEPARIN (PORCINE) PER 1000 UNITS: Performed by: SURGERY

## 2023-09-07 PROCEDURE — 80048 BASIC METABOLIC PNL TOTAL CA: CPT | Performed by: SURGERY

## 2023-09-07 PROCEDURE — 84100 ASSAY OF PHOSPHORUS: CPT | Performed by: SURGERY

## 2023-09-07 RX ADMIN — METOCLOPRAMIDE 10 MG: 5 INJECTION, SOLUTION INTRAMUSCULAR; INTRAVENOUS at 05:37

## 2023-09-07 RX ADMIN — SENNOSIDES AND DOCUSATE SODIUM 2 TABLET: 8.6; 5 TABLET ORAL at 21:41

## 2023-09-07 RX ADMIN — ACETAMINOPHEN 650 MG: 325 TABLET ORAL at 05:36

## 2023-09-07 RX ADMIN — HEPARIN SODIUM 5000 UNITS: 5000 INJECTION, SOLUTION INTRAVENOUS; SUBCUTANEOUS at 05:37

## 2023-09-07 RX ADMIN — METOCLOPRAMIDE 10 MG: 5 INJECTION, SOLUTION INTRAMUSCULAR; INTRAVENOUS at 17:25

## 2023-09-07 RX ADMIN — METOCLOPRAMIDE 10 MG: 5 INJECTION, SOLUTION INTRAMUSCULAR; INTRAVENOUS at 12:16

## 2023-09-07 RX ADMIN — ACETAMINOPHEN 650 MG: 325 TABLET ORAL at 17:25

## 2023-09-07 RX ADMIN — SODIUM CHLORIDE, SODIUM LACTATE, POTASSIUM CHLORIDE, CALCIUM CHLORIDE AND DEXTROSE MONOHYDRATE 100 ML/HR: 5; 600; 310; 30; 20 INJECTION, SOLUTION INTRAVENOUS at 01:54

## 2023-09-07 RX ADMIN — ACETAMINOPHEN 650 MG: 325 TABLET ORAL at 12:16

## 2023-09-07 RX ADMIN — HEPARIN SODIUM 5000 UNITS: 5000 INJECTION, SOLUTION INTRAVENOUS; SUBCUTANEOUS at 21:40

## 2023-09-07 RX ADMIN — SODIUM CHLORIDE, SODIUM LACTATE, POTASSIUM CHLORIDE, CALCIUM CHLORIDE AND DEXTROSE MONOHYDRATE 30 ML/HR: 5; 600; 310; 30; 20 INJECTION, SOLUTION INTRAVENOUS at 15:24

## 2023-09-07 RX ADMIN — ALVIMOPAN 12 MG: 12 CAPSULE ORAL at 21:40

## 2023-09-07 RX ADMIN — ALVIMOPAN 12 MG: 12 CAPSULE ORAL at 08:34

## 2023-09-07 RX ADMIN — HEPARIN SODIUM 5000 UNITS: 5000 INJECTION, SOLUTION INTRAVENOUS; SUBCUTANEOUS at 15:23

## 2023-09-07 RX ADMIN — Medication 10 ML: at 21:42

## 2023-09-07 RX ADMIN — Medication 10 ML: at 08:33

## 2023-09-07 NOTE — ADDENDUM NOTE
Addendum  created 09/07/23 1320 by Edison Regalado, BERNARDO    Clinical Note Signed, Diagnosis association updated, Intraprocedure Blocks edited, SmartForm saved       Physician Progress Note      PATIENTCathlchinedu Iglesias  CSN #:                  [id number]  :                       1947  ADMIT DATE:       2021 5:34 PM  [address] [place] DATE:        2021 11:30 AM  RESPONDING  PROVIDER #:        Ashlyn Casey MD          QUERY TEXT:    Dear Hospitalist Team,  Patient admitted with unresponsiveness and acute hypoxic respiratory failure needing intubation and mechanical ventilation. It's noted initial testing positive for COVID-19 on  per rapid result and PCR came back negative for COVID-19. If possible, please document in the progress notes and discharge summary if COVID-19 was: The medical record reflects the following:    Risk Factors: 72 Yr F admitted with COVID19; shock; Acute hypoxic respiratory failure    Clinical Indicators: Patient arrived via EMS for unresponsiveness. Work up in the ED revealed a rapid COVID19 positive with SIRS of temperature 90.6, WBC 16.7, lactic 2.4, ferritin 940 and elevated D-Dimer 9.69. ABG PH: 7.10 PCO2 80.4 PO2 263 HCO3 25.1 O2SAT 99.7. CTA Chest with Mild bilateral atelectasis. PCR was sent and came back negative. Progress notes by critical care consultant Dr. Guera Renteria note on  states, Patient was intubated in the ED. Tox screen is positive for cocaine and THC. She Tested positive for Covid. Currently she is on vent support at 40% oxygen. She is on heparin drip. She sedated on propofol. She is on Levophed. Lactic acid is down from 2.4 to 1.1 D-Dimer 9.7. Progress note on  and discharge summary state, Pneumonia due to COVID 19 / Leukocytopenia - Positive rapid test, second test negative. Xray normal.  But given her illness and hypoxia, assume infection and start decadron, pulmonary to consider remdesivir and actemra in setting of other acute illness. With negative xray will hold off other Abx for now, awaiting final blood cx.  Patient provided decadron 6 mg PO daily and remdesivir 200 mg IV once.    Treatment: ICU level of care, droplet precautions, critical care consult, CXR, CTA Chest, intubation/ventilation, ABG, COVID-19 testing, frequent monitoring/vital signs, decadron 6 mg PO daily and remdesivir 200 mg IV once. Thank you,  Amber Driver RN, [place], [address] [facility]    Note: Possible (or similar verbiage reflecting an uncertain diagnosis) [id number] infection may not be reported as confirmed. However, a diagnosis may be considered as confirmed based on the clinical impression of the provider, regardless of the presence of a confirmatory test or availability of a test result. At present, a negative test result does not exclude the diagnosis of COVID-19 infection. Options provided:  -- COVID-19 ruled out after study  -- COVID-19 confirmed after study with PCR testing suspected to be false negative  -- Other - I will add my own diagnosis  -- Disagree - Not applicable / Not valid  -- Disagree - Clinically unable to determine / Unknown  -- Refer to Clinical Documentation Reviewer    PROVIDER RESPONSE TEXT:    COVID-19 was ruled out after study. Query created by:  Rita Zaldivar on 9/7/2021 10:18 AM      Electronically signed by:  Mariam Hudson MD 9/9/2021 7:16 PM

## 2023-09-07 NOTE — PROGRESS NOTES
"Patient Name:  Tank Tejeda  YOB: 1970  [id number]    Surgery Progress Note    Date of visit: 9/7/2023      Subjective: No acute events overnight.  Still reports some numbness in his thumb and index finger on the right side, numbness and tingling in the forearm has improved.  Had some nausea earlier in the evening but no episodes of emesis.  No nausea for several hours now.  Ambulated 1 time yesterday.  Pain is controlled with PCA.          Objective:     /80 (BP Location: Right arm, Patient Position: Lying)   Pulse 60   Temp 97.7 °F (36.5 °C) (Oral)   Resp 18   Ht 185.4 cm (72.99\")   Wt 120 kg (264 lb)   SpO2 98%   BMI 34.84 kg/m²     Intake/Output Summary (Last 24 hours) at 9/7/2023 0652  Last data filed at 9/7/2023 0527  Gross per 24 hour   Intake 1103.5 ml   Output 2575 ml   Net -1471.5 ml       GEN:   Awake, alert, in no acute distress, resting comfortably in bed   CV:   Regular rate and rhythm  L:  Symmetric expansion, not labored on room air  Abd:  Soft, appropriately tender palpation along incisions, incisions are clean dry intact, not obviously distended  Ext:  No cyanosis, clubbing, or edema    Recent labs that are back at this time have been reviewed.           Assessment/ Plan:    Mr. Tejeda is a 53-year-old gentleman who was admitted after undergoing laparoscopic right colectomy on September 6, 2023 for an endoscopically unresectable polyp of the appendiceal orifice    -Postoperative day 1  -Vital signs are stable  -Labs are without acute findings  -Discontinue Perea catheter today  -Decrease IV fluid rate  -Okay for ice chips and popsicles  -Mobilize  -Continue PCA for now      Rinku Urrutia MD  9/7/2023  06:52 EDT      "

## 2023-09-07 NOTE — PROGRESS NOTES
"Anesthesia follow up after pt C/O numbness and tingling in right thumb, index finger and middle finger. Forearm is \"heavy feeling.\" We will consult Neurology and start Gabapentin in the morning if symptoms persist. Thank you Edison eRgalado Chief CRNA  "

## 2023-09-07 NOTE — CASE MANAGEMENT/SOCIAL WORK
Discharge Planning Assessment  [facility]     Patient Name: Tank Tejeda  MRN: [id number]  Today's Date: 9/7/2023    Admit Date: 9/6/2023    Plan: Home   Discharge Needs Assessment       Row Name 09/07/23 0819       Living Environment    People in Home alone    Current Living Arrangements apartment    Potentially Unsafe Housing Conditions none    Primary Care Provided by self    Provides Primary Care For no one    Family Caregiver if Needed child(ojseph), adult;friend(s)    Family Caregiver Names Amanda (friend) [phone]    Able to Return to Prior Arrangements yes       Resource/Environmental Concerns    Resource/Environmental Concerns none    Transportation Concerns none       Transition Planning    Patient/Family Anticipates Transition to home    Patient/Family Anticipated Services at Transition none    Transportation Anticipated family or friend will provide       Discharge Needs Assessment    Readmission Within the Last 30 Days no previous admission in last 30 days    Equipment Currently Used at Home bp cuff;glucometer    Concerns to be Addressed denies needs/concerns at this time    Anticipated Changes Related to Illness none    Equipment Needed After Discharge none                   Discharge Plan       Row Name 09/07/23 0820       Plan    Plan Home    Patient/Family in Agreement with Plan yes    Plan Comments Spoke to patient at bedside. Lives alone in [place]. Contact is Amanda Meehan (friend) [phone]. Is independent with ADL's. No problems with [place] BCBS or medications. Uses a BP cuff and glucometer at home. No advanced directives. PCP is Alice Mantilla DO. Plan is home. CM will continue to follow.    Final Discharge Disposition Code 01 - home or self-care                  Continued Care and Services - Admitted Since 9/6/2023    Coordination has not been started for this encounter.          Demographic Summary       Row Name 09/07/23 0819       General Information    Admission Type inpatient     Arrived From PACU/recovery room    Referral Source admission list    Reason for Consult discharge planning    Preferred Language English       Contact Information    Permission Granted to Share Info With     Contact Information Obtained for                    Functional Status       Row Name 09/07/23 0819       Functional Status    Usual Activity Tolerance good    Current Activity Tolerance good       Functional Status, IADL    Medications independent    Meal Preparation independent    Housekeeping independent    Laundry independent    Shopping independent       Mental Status    General Appearance WDL WDL       Mental Status Summary    Recent Changes in Mental Status/Cognitive Functioning no changes       Employment/    Employment Status employed full-time                   Psychosocial    No documentation.                  Abuse/Neglect    No documentation.                  Legal    No documentation.                  Substance Abuse    No documentation.                  Patient Forms    No documentation.                     Deshaun Dewitt RN

## 2023-09-07 NOTE — PLAN OF CARE
Problem: Adult Inpatient Plan of Care  Goal: Plan of Care Review  9/7/2023 1349 by Ihsan Braga RN  Outcome: Ongoing, Progressing  Flowsheets (Taken 9/7/2023 1011)  Progress: improving  9/7/2023 1011 by Ihsan Braga RN  Outcome: Ongoing, Progressing  Flowsheets (Taken 9/7/2023 1011)  Progress: improving  Plan of Care Reviewed With: patient  Goal: Patient-Specific Goal (Individualized)  9/7/2023 1349 by Ihsan Braga RN  Outcome: Ongoing, Progressing  9/7/2023 1011 by Ihsan Braga RN  Outcome: Ongoing, Progressing  Goal: Absence of Hospital-Acquired Illness or Injury  9/7/2023 1349 by Ihsan Braga RN  Outcome: Ongoing, Progressing  9/7/2023 1011 by Ihsan Braga RN  Outcome: Ongoing, Progressing  Intervention: Identify and Manage Fall Risk  Recent Flowsheet Documentation  Taken 9/7/2023 1200 by Ihsan Braga RN  Safety Promotion/Fall Prevention:   activity supervised   clutter free environment maintained   fall prevention program maintained   nonskid shoes/slippers when out of bed   room organization consistent   safety round/check completed  Taken 9/7/2023 1000 by Ihsan Braga RN  Safety Promotion/Fall Prevention:   activity supervised   clutter free environment maintained   fall prevention program maintained   assistive device/personal items within reach   nonskid shoes/slippers when out of bed   room organization consistent   safety round/check completed  Taken 9/7/2023 0832 by Ihsan Braga RN  Safety Promotion/Fall Prevention:   activity supervised   assistive device/personal items within reach   clutter free environment maintained   fall prevention program maintained   nonskid shoes/slippers when out of bed   room organization consistent   safety round/check completed  Intervention: Prevent Skin Injury  Recent Flowsheet Documentation  Taken 9/7/2023 1330 by Ihsan Braga RN  Body Position: position changed independently  Taken 9/7/2023 1200 by Ihsan Braga RN  Body Position:  position changed independently  Skin Protection:   adhesive use limited   tubing/devices free from skin contact  Taken 9/7/2023 1000 by Ihsan Braga RN  Body Position:   position changed independently   weight shifting  Skin Protection:   adhesive use limited   tubing/devices free from skin contact  Taken 9/7/2023 0832 by Ihsan Braga RN  Body Position:   position changed independently   weight shifting  Skin Protection:   adhesive use limited   tubing/devices free from skin contact  Intervention: Prevent and Manage VTE (Venous Thromboembolism) Risk  Recent Flowsheet Documentation  Taken 9/7/2023 1330 by Ihsan Braga RN  Activity Management: back to bed  Taken 9/7/2023 1300 by Ihsan Braga RN  Activity Management: up in chair  Taken 9/7/2023 1215 by Ihsan Braga RN  Activity Management: up in chair  Taken 9/7/2023 1200 by Ihsan Braga RN  Activity Management: ambulated outside room  Taken 9/7/2023 1000 by Ihsan Braga RN  Activity Management: activity encouraged  Taken 9/7/2023 0832 by Ihsan Braga RN  Activity Management: activity encouraged  VTE Prevention/Management: (patient is also on Heparin Sub Q)   bilateral   sequential compression devices on  Range of Motion: active ROM (range of motion) encouraged  Intervention: Prevent Infection  Recent Flowsheet Documentation  Taken 9/7/2023 1200 by Ihsan Braga RN  Infection Prevention:   equipment surfaces disinfected   environmental surveillance performed   hand hygiene promoted   personal protective equipment utilized   rest/sleep promoted   single patient room provided  Taken 9/7/2023 1000 by Ihsan Braga RN  Infection Prevention:   environmental surveillance performed   equipment surfaces disinfected   hand hygiene promoted   personal protective equipment utilized   rest/sleep promoted   single patient room provided  Taken 9/7/2023 0832 by Ihsan Braga RN  Infection Prevention:   environmental surveillance performed   equipment  surfaces disinfected   hand hygiene promoted   personal protective equipment utilized   rest/sleep promoted   single patient room provided  Goal: Optimal Comfort and Wellbeing  9/7/2023 1349 by Ihsan Braga RN  Outcome: Ongoing, Progressing  9/7/2023 1011 by Ihsan Braga RN  Outcome: Ongoing, Progressing  Intervention: Monitor Pain and Promote Comfort  Recent Flowsheet Documentation  Taken 9/7/2023 1000 by Ihsan Braga RN  Pain Management Interventions: pain pump in use  Taken 9/7/2023 0832 by Ihsan Braga RN  Pain Management Interventions: pain pump in use  Intervention: Provide Person-Centered Care  Recent Flowsheet Documentation  Taken 9/7/2023 0832 by Ihsan Braga RN  Trust Relationship/Rapport:   care explained   choices provided   emotional support provided   empathic listening provided   questions answered   questions encouraged   reassurance provided   thoughts/feelings acknowledged  Goal: Readiness for Transition of Care  9/7/2023 1349 by Ihsan Braga RN  Outcome: Ongoing, Progressing  9/7/2023 1011 by Ihsan Braga RN  Outcome: Ongoing, Progressing     Problem: Pain Acute  Goal: Acceptable Pain Control and Functional Ability  9/7/2023 1349 by Ihsan Braga RN  Outcome: Ongoing, Progressing  9/7/2023 1011 by Ihsan Braga RN  Outcome: Ongoing, Progressing  Intervention: Prevent or Manage Pain  Recent Flowsheet Documentation  Taken 9/7/2023 1200 by Ihsan Braga RN  Sensory Stimulation Regulation:   auditory stimulation minimized   care clustered   lighting decreased   quiet environment promoted   tactile stimulation minimized   visual stimulation minimized  Sleep/Rest Enhancement:   awakenings minimized   consistent schedule promoted   family presence promoted   natural light exposure provided   regular sleep/rest pattern promoted   relaxation techniques promoted  Medication Review/Management:   medications reviewed   high-risk medications identified  Taken 9/7/2023 1000 by Omi  FELICIA Champagne  Sensory Stimulation Regulation:   auditory stimulation minimized   lighting decreased   quiet environment promoted   tactile stimulation minimized   visual stimulation minimized  Sleep/Rest Enhancement:   awakenings minimized   consistent schedule promoted   natural light exposure provided   regular sleep/rest pattern promoted  Medication Review/Management:   medications reviewed   high-risk medications identified  Taken 9/7/2023 0832 by Ihsan Braga RN  Sensory Stimulation Regulation:   auditory stimulation minimized   care clustered   quiet environment promoted   tactile stimulation minimized   visual stimulation minimized  Sleep/Rest Enhancement:   awakenings minimized   consistent schedule promoted   natural light exposure provided   regular sleep/rest pattern promoted   relaxation techniques promoted  Medication Review/Management:   medications reviewed   high-risk medications identified  Intervention: Develop Pain Management Plan  Recent Flowsheet Documentation  Taken 9/7/2023 1000 by Ihsan Braga RN  Pain Management Interventions: pain pump in use  Taken 9/7/2023 0832 by Ihsan Braga RN  Pain Management Interventions: pain pump in use  Intervention: Optimize Psychosocial Wellbeing  Recent Flowsheet Documentation  Taken 9/7/2023 1200 by Ihsan Braga RN  Supportive Measures:   active listening utilized   decision-making supported   relaxation techniques promoted   verbalization of feelings encouraged  Spiritual Activities Assistance: affirmation provided  Taken 9/7/2023 1000 by Ihsan Braga RN  Supportive Measures:   active listening utilized   positive reinforcement provided   verbalization of feelings encouraged  Spiritual Activities Assistance: affirmation provided  Taken 9/7/2023 0832 by Ihsan Braga RN  Supportive Measures:   active listening utilized   decision-making supported   positive reinforcement provided   relaxation techniques promoted   self-responsibility promoted    verbalization of feelings encouraged  Diversional Activities:   television   smartphone  Spiritual Activities Assistance: affirmation provided     Problem: Infection  Goal: Absence of Infection Signs and Symptoms  9/7/2023 1349 by Ihsan Braga RN  Outcome: Ongoing, Progressing  9/7/2023 1011 by Ihsan Braga RN  Outcome: Ongoing, Progressing  Intervention: Prevent or Manage Infection  Recent Flowsheet Documentation  Taken 9/7/2023 1200 by Ihsan Braga RN  Infection Management: aseptic technique maintained  Taken 9/7/2023 1000 by Ihsan Braga RN  Infection Management: aseptic technique maintained  Fever Reduction/Comfort Measures:   lightweight bedding   lightweight clothing  Taken 9/7/2023 0832 by Ihsan Braga RN  Infection Management: aseptic technique maintained  Fever Reduction/Comfort Measures:   lightweight bedding   lightweight clothing     Problem: Fall Injury Risk  Goal: Absence of Fall and Fall-Related Injury  9/7/2023 1349 by Ihsan Braga RN  Outcome: Ongoing, Progressing  9/7/2023 1011 by Ihsan Braga RN  Outcome: Ongoing, Progressing  Intervention: Identify and Manage Contributors  Recent Flowsheet Documentation  Taken 9/7/2023 1200 by Ihsan Braga RN  Medication Review/Management:   medications reviewed   high-risk medications identified  Self-Care Promotion:   independence encouraged   safe use of adaptive equipment encouraged   BADL personal objects within reach   BADL personal routines maintained  Taken 9/7/2023 1000 by Ihsan Braga RN  Medication Review/Management:   medications reviewed   high-risk medications identified  Self-Care Promotion:   independence encouraged   safe use of adaptive equipment encouraged   BADL personal objects within reach   BADL personal routines maintained  Taken 9/7/2023 0832 by Ihsan Braga RN  Medication Review/Management:   medications reviewed   high-risk medications identified  Self-Care Promotion:   independence encouraged   safe use of  adaptive equipment encouraged  Intervention: Promote Injury-Free Environment  Recent Flowsheet Documentation  Taken 9/7/2023 1200 by Ihsan Braga, RN  Safety Promotion/Fall Prevention:   activity supervised   clutter free environment maintained   fall prevention program maintained   nonskid shoes/slippers when out of bed   room organization consistent   safety round/check completed  Taken 9/7/2023 1000 by Ihsan Braga, RN  Safety Promotion/Fall Prevention:   activity supervised   clutter free environment maintained   fall prevention program maintained   assistive device/personal items within reach   nonskid shoes/slippers when out of bed   room organization consistent   safety round/check completed  Taken 9/7/2023 0832 by Ihsan Braga, RN  Safety Promotion/Fall Prevention:   activity supervised   assistive device/personal items within reach   clutter free environment maintained   fall prevention program maintained   nonskid shoes/slippers when out of bed   room organization consistent   safety round/check completed   Goal Outcome Evaluation:  Plan of Care Reviewed With: patient        Progress: improving

## 2023-09-07 NOTE — PLAN OF CARE
Problem: Adult Inpatient Plan of Care  Goal: Plan of Care Review  Outcome: Ongoing, Progressing  Flowsheets (Taken 9/7/2023 1011)  Progress: improving  Plan of Care Reviewed With: patient  Goal: Patient-Specific Goal (Individualized)  Outcome: Ongoing, Progressing  Goal: Absence of Hospital-Acquired Illness or Injury  Outcome: Ongoing, Progressing  Goal: Optimal Comfort and Wellbeing  Outcome: Ongoing, Progressing  Goal: Readiness for Transition of Care  Outcome: Ongoing, Progressing     Problem: Pain Acute  Goal: Acceptable Pain Control and Functional Ability  Outcome: Ongoing, Progressing     Problem: Infection  Goal: Absence of Infection Signs and Symptoms  Outcome: Ongoing, Progressing     Problem: Fall Injury Risk  Goal: Absence of Fall and Fall-Related Injury  Outcome: Ongoing, Progressing   Goal Outcome Evaluation:  Plan of Care Reviewed With: patient        Progress: improving

## 2023-09-07 NOTE — PLAN OF CARE
Goal Outcome Evaluation:  A/O x4, RA, VSS, NPO w/ sips and chips, C/o pain in abd but more in R forearm r/t numbness and tingling. C/o nausea at start of shift- resolved when got up to ambulate. Ambulated x1 to outside room and back to bed- encouraged to ambulate. Cont to use Dilaudid PCA. Abd binder in place.     Plan of Care Reviewed With: patient        Progress: no change       Problem: Adult Inpatient Plan of Care  Goal: Plan of Care Review  Outcome: Ongoing, Progressing  Flowsheets (Taken 9/7/2023 0644)  Progress: no change  Plan of Care Reviewed With: patient  Goal: Patient-Specific Goal (Individualized)  Outcome: Ongoing, Progressing  Goal: Absence of Hospital-Acquired Illness or Injury  Outcome: Ongoing, Progressing  Intervention: Identify and Manage Fall Risk  Recent Flowsheet Documentation  Taken 9/7/2023 0600 by Heather Smith, RN  Safety Promotion/Fall Prevention:   activity supervised   assistive device/personal items within reach   clutter free environment maintained   nonskid shoes/slippers when out of bed   room organization consistent   safety round/check completed  Taken 9/7/2023 0421 by Heather Smith, RN  Safety Promotion/Fall Prevention:   activity supervised   assistive device/personal items within reach   clutter free environment maintained   nonskid shoes/slippers when out of bed   room organization consistent   safety round/check completed  Taken 9/7/2023 0200 by Heather Smith, RN  Safety Promotion/Fall Prevention:   activity supervised   assistive device/personal items within reach   clutter free environment maintained   nonskid shoes/slippers when out of bed   room organization consistent   safety round/check completed  Taken 9/7/2023 0000 by Heather Smith, RN  Safety Promotion/Fall Prevention:   activity supervised   assistive device/personal items within reach   clutter free environment maintained   nonskid shoes/slippers when out of bed   safety round/check  completed   room organization consistent  Taken 9/6/2023 2200 by Heather Smith RN  Safety Promotion/Fall Prevention:   activity supervised   assistive device/personal items within reach   clutter free environment maintained   nonskid shoes/slippers when out of bed   room organization consistent   safety round/check completed  Taken 9/6/2023 2000 by Heather Smith RN  Safety Promotion/Fall Prevention:   activity supervised   assistive device/personal items within reach   clutter free environment maintained   nonskid shoes/slippers when out of bed   room organization consistent   safety round/check completed  Intervention: Prevent Skin Injury  Recent Flowsheet Documentation  Taken 9/7/2023 0600 by Heather Smith RN  Body Position: position changed independently  Skin Protection:   adhesive use limited   tubing/devices free from skin contact  Taken 9/7/2023 0421 by Heather Smith RN  Body Position: position changed independently  Skin Protection:   adhesive use limited   tubing/devices free from skin contact  Taken 9/7/2023 0200 by Heather Smith RN  Body Position: position changed independently  Skin Protection:   adhesive use limited   tubing/devices free from skin contact  Taken 9/7/2023 0000 by Heather Smith RN  Body Position: position changed independently  Skin Protection:   adhesive use limited   tubing/devices free from skin contact  Taken 9/6/2023 2200 by Heather Smith RN  Body Position: position changed independently  Skin Protection:   adhesive use limited   tubing/devices free from skin contact  Taken 9/6/2023 2000 by Heather Smith RN  Body Position: position changed independently  Skin Protection:   adhesive use limited   tubing/devices free from skin contact  Intervention: Prevent and Manage VTE (Venous Thromboembolism) Risk  Recent Flowsheet Documentation  Taken 9/7/2023 0600 by Heather Smith RN  Activity Management: activity encouraged  Taken 9/7/2023  0421 by Heather Smith, RN  Activity Management: activity encouraged  Taken 9/7/2023 0200 by Heather Smith, RN  Activity Management: activity encouraged  Taken 9/7/2023 0000 by Heather Smith RN  Activity Management: activity encouraged  Taken 9/6/2023 2200 by Heather Smith RN  Activity Management: activity encouraged  Taken 9/6/2023 2019 by Heather Smith, RN  Activity Management:   ambulated outside room   ambulated in room  Taken 9/6/2023 2000 by Heather Smith RN  Activity Management: activity encouraged  VTE Prevention/Management:   bilateral   sequential compression devices on  Range of Motion: active ROM (range of motion) encouraged  Intervention: Prevent Infection  Recent Flowsheet Documentation  Taken 9/6/2023 2000 by Heather Smith RN  Infection Prevention:   cohorting utilized   environmental surveillance performed   equipment surfaces disinfected   hand hygiene promoted   rest/sleep promoted  Goal: Optimal Comfort and Wellbeing  Outcome: Ongoing, Progressing  Intervention: Monitor Pain and Promote Comfort  Recent Flowsheet Documentation  Taken 9/6/2023 2200 by Heather Smith RN  Pain Management Interventions: pain pump in use  Taken 9/6/2023 2000 by Heather Smith RN  Pain Management Interventions:   pain pump in use   position adjusted   see MAR   care clustered  Intervention: Provide Person-Centered Care  Recent Flowsheet Documentation  Taken 9/6/2023 2000 by Heather Smith RN  Trust Relationship/Rapport:   care explained   choices provided   questions answered   thoughts/feelings acknowledged  Goal: Readiness for Transition of Care  Outcome: Ongoing, Progressing

## 2023-09-08 PROCEDURE — 25010000002 HEPARIN (PORCINE) PER 1000 UNITS: Performed by: SURGERY

## 2023-09-08 PROCEDURE — 25010000002 METOCLOPRAMIDE PER 10 MG: Performed by: SURGERY

## 2023-09-08 PROCEDURE — 94761 N-INVAS EAR/PLS OXIMETRY MLT: CPT

## 2023-09-08 RX ORDER — ACETAMINOPHEN 325 MG/1
650 TABLET ORAL EVERY 6 HOURS PRN
Status: DISCONTINUED | OUTPATIENT
Start: 2023-09-08 | End: 2023-09-09 | Stop reason: HOSPADM

## 2023-09-08 RX ORDER — OXYCODONE HYDROCHLORIDE AND ACETAMINOPHEN 5; 325 MG/1; MG/1
1 TABLET ORAL EVERY 4 HOURS PRN
Status: DISCONTINUED | OUTPATIENT
Start: 2023-09-08 | End: 2023-09-09 | Stop reason: HOSPADM

## 2023-09-08 RX ADMIN — HEPARIN SODIUM 5000 UNITS: 5000 INJECTION, SOLUTION INTRAVENOUS; SUBCUTANEOUS at 23:07

## 2023-09-08 RX ADMIN — METOCLOPRAMIDE 10 MG: 5 INJECTION, SOLUTION INTRAMUSCULAR; INTRAVENOUS at 23:07

## 2023-09-08 RX ADMIN — ACETAMINOPHEN 650 MG: 325 TABLET ORAL at 11:44

## 2023-09-08 RX ADMIN — OXYCODONE HYDROCHLORIDE AND ACETAMINOPHEN 1 TABLET: 5; 325 TABLET ORAL at 20:27

## 2023-09-08 RX ADMIN — SENNOSIDES AND DOCUSATE SODIUM 2 TABLET: 8.6; 5 TABLET ORAL at 20:27

## 2023-09-08 RX ADMIN — ALVIMOPAN 12 MG: 12 CAPSULE ORAL at 20:27

## 2023-09-08 RX ADMIN — METOCLOPRAMIDE 10 MG: 5 INJECTION, SOLUTION INTRAMUSCULAR; INTRAVENOUS at 00:54

## 2023-09-08 RX ADMIN — METOCLOPRAMIDE 10 MG: 5 INJECTION, SOLUTION INTRAMUSCULAR; INTRAVENOUS at 06:45

## 2023-09-08 RX ADMIN — Medication 10 ML: at 20:29

## 2023-09-08 RX ADMIN — HEPARIN SODIUM 5000 UNITS: 5000 INJECTION, SOLUTION INTRAVENOUS; SUBCUTANEOUS at 14:48

## 2023-09-08 RX ADMIN — ACETAMINOPHEN 650 MG: 325 TABLET ORAL at 06:44

## 2023-09-08 RX ADMIN — ACETAMINOPHEN 650 MG: 325 TABLET ORAL at 00:54

## 2023-09-08 RX ADMIN — METOCLOPRAMIDE 10 MG: 5 INJECTION, SOLUTION INTRAMUSCULAR; INTRAVENOUS at 18:21

## 2023-09-08 RX ADMIN — METOCLOPRAMIDE 10 MG: 5 INJECTION, SOLUTION INTRAMUSCULAR; INTRAVENOUS at 11:44

## 2023-09-08 RX ADMIN — HEPARIN SODIUM 5000 UNITS: 5000 INJECTION, SOLUTION INTRAVENOUS; SUBCUTANEOUS at 06:44

## 2023-09-08 NOTE — PLAN OF CARE
Problem: Adult Inpatient Plan of Care  Goal: Plan of Care Review  Outcome: Ongoing, Progressing  Goal: Patient-Specific Goal (Individualized)  Outcome: Ongoing, Progressing  Goal: Absence of Hospital-Acquired Illness or Injury  Outcome: Ongoing, Progressing  Intervention: Identify and Manage Fall Risk  Recent Flowsheet Documentation  Taken 9/8/2023 0800 by Ihsan Braga RN  Safety Promotion/Fall Prevention:   assistive device/personal items within reach   activity supervised   clutter free environment maintained   fall prevention program maintained   nonskid shoes/slippers when out of bed   room organization consistent   safety round/check completed  Intervention: Prevent Skin Injury  Recent Flowsheet Documentation  Taken 9/8/2023 1200 by Ihsan Braga RN  Body Position:   position changed independently   weight shifting  Skin Protection:   adhesive use limited   tubing/devices free from skin contact  Taken 9/8/2023 1000 by Ihsan Braga RN  Body Position:   position changed independently   weight shifting  Skin Protection:   adhesive use limited   tubing/devices free from skin contact  Taken 9/8/2023 0800 by Ihsan Braga RN  Body Position:   position changed independently   weight shifting  Skin Protection:   adhesive use limited   tubing/devices free from skin contact  Intervention: Prevent and Manage VTE (Venous Thromboembolism) Risk  Recent Flowsheet Documentation  Taken 9/8/2023 1200 by Ihsan Braga RN  Activity Management: ambulated outside room  Taken 9/8/2023 1000 by Ihsan Braga RN  Activity Management: activity minimized  Taken 9/8/2023 0800 by Ihsan Braga RN  Activity Management: ambulated outside room  VTE Prevention/Management: (patient is on Heparin)   bilateral   sequential compression devices off  Intervention: Prevent Infection  Recent Flowsheet Documentation  Taken 9/8/2023 1200 by Ihsan Braga RN  Infection Prevention:   environmental surveillance performed   equipment  surfaces disinfected   hand hygiene promoted   personal protective equipment utilized   rest/sleep promoted   single patient room provided  Taken 9/8/2023 1000 by Ihsan Braga RN  Infection Prevention:   environmental surveillance performed   equipment surfaces disinfected   hand hygiene promoted   personal protective equipment utilized   rest/sleep promoted   single patient room provided  Taken 9/8/2023 0800 by Ihsan Braga RN  Infection Prevention:   environmental surveillance performed   equipment surfaces disinfected   hand hygiene promoted   personal protective equipment utilized   rest/sleep promoted   single patient room provided  Goal: Optimal Comfort and Wellbeing  Outcome: Ongoing, Progressing  Intervention: Provide Person-Centered Care  Recent Flowsheet Documentation  Taken 9/8/2023 0800 by Ihsan Braga RN  Trust Relationship/Rapport:   care explained   choices provided   emotional support provided   empathic listening provided   questions answered   questions encouraged  Goal: Readiness for Transition of Care  Outcome: Ongoing, Progressing     Problem: Pain Acute  Goal: Acceptable Pain Control and Functional Ability  Outcome: Ongoing, Progressing  Intervention: Prevent or Manage Pain  Recent Flowsheet Documentation  Taken 9/8/2023 1200 by Ihsan Braga RN  Sensory Stimulation Regulation:   auditory stimulation minimized   care clustered   quiet environment promoted   tactile stimulation minimized   visual stimulation minimized  Sleep/Rest Enhancement:   awakenings minimized   consistent schedule promoted   regular sleep/rest pattern promoted  Taken 9/8/2023 1000 by Ihsan Braga RN  Sensory Stimulation Regulation:   auditory stimulation minimized   lighting decreased   quiet environment promoted   tactile stimulation minimized   visual stimulation minimized  Sleep/Rest Enhancement:   awakenings minimized   regular sleep/rest pattern promoted   relaxation techniques promoted  Taken 9/8/2023  0800 by Ihsan Braga RN  Sensory Stimulation Regulation:   auditory stimulation minimized   care clustered   quiet environment promoted   tactile stimulation minimized   visual stimulation minimized  Sleep/Rest Enhancement:   awakenings minimized   consistent schedule promoted   natural light exposure provided   regular sleep/rest pattern promoted   relaxation techniques promoted  Medication Review/Management:   medications reviewed   high-risk medications identified  Intervention: Optimize Psychosocial Wellbeing  Recent Flowsheet Documentation  Taken 9/8/2023 1200 by Ihsna Braga RN  Supportive Measures:   active listening utilized   relaxation techniques promoted   verbalization of feelings encouraged  Spiritual Activities Assistance: affirmation provided  Taken 9/8/2023 1000 by Ihsan Braga RN  Supportive Measures:   active listening utilized   verbalization of feelings encouraged  Spiritual Activities Assistance: affirmation provided  Taken 9/8/2023 0800 by Ihsan Braga RN  Supportive Measures:   active listening utilized   decision-making supported   positive reinforcement provided   relaxation techniques promoted   verbalization of feelings encouraged  Spiritual Activities Assistance:   affirmation provided   personal rituals encouraged     Problem: Infection  Goal: Absence of Infection Signs and Symptoms  Outcome: Ongoing, Progressing  Intervention: Prevent or Manage Infection  Recent Flowsheet Documentation  Taken 9/8/2023 1200 by Ihsan Braga RN  Infection Management: aseptic technique maintained  Fever Reduction/Comfort Measures:   lightweight bedding   lightweight clothing  Taken 9/8/2023 1000 by Ihsan Braga RN  Infection Management: aseptic technique maintained  Fever Reduction/Comfort Measures:   lightweight bedding   lightweight clothing  Taken 9/8/2023 0800 by Ihsan Braga RN  Infection Management: aseptic technique maintained  Fever Reduction/Comfort Measures:   lightweight  bedding   lightweight clothing     Problem: Fall Injury Risk  Goal: Absence of Fall and Fall-Related Injury  Outcome: Ongoing, Progressing  Intervention: Identify and Manage Contributors  Recent Flowsheet Documentation  Taken 9/8/2023 1200 by Ihsan Braga RN  Self-Care Promotion:   independence encouraged   safe use of adaptive equipment encouraged  Taken 9/8/2023 1000 by Ihsan Braga RN  Self-Care Promotion:   independence encouraged   safe use of adaptive equipment encouraged  Taken 9/8/2023 0800 by Ihsan Braga RN  Medication Review/Management:   medications reviewed   high-risk medications identified  Self-Care Promotion:   independence encouraged   safe use of adaptive equipment encouraged   BADL personal objects within reach   BADL personal routines maintained  Intervention: Promote Injury-Free Environment  Recent Flowsheet Documentation  Taken 9/8/2023 0800 by Ihsan Braga RN  Safety Promotion/Fall Prevention:   assistive device/personal items within reach   activity supervised   clutter free environment maintained   fall prevention program maintained   nonskid shoes/slippers when out of bed   room organization consistent   safety round/check completed   Goal Outcome Evaluation:  Plan of Care Reviewed With: patient        Progress: improving

## 2023-09-08 NOTE — PROGRESS NOTES
"Patient Name:  Tank Tejeda  YOB: 1970  [id number]    Surgery Progress Note    Date of visit: 9/8/2023      Subjective: No acute events overnight.  Denies any nausea or vomiting.  No bloating.  Tolerated clear liquids yesterday without any difficulty.  No flatus or bowel movement.  Ambulated in the hallway yesterday.  Reports that pain in his hand and forearm is resolved.  Does have still some tingling in his thumb and index finger but this is significantly improved as compared to yesterday          Objective:     /80 (BP Location: Right arm, Patient Position: Lying)   Pulse 57   Temp 98.6 °F (37 °C) (Oral)   Resp 18   Ht 185.4 cm (72.99\")   Wt 120 kg (264 lb)   SpO2 97%   BMI 34.84 kg/m²     Intake/Output Summary (Last 24 hours) at 9/8/2023 0701  Last data filed at 9/8/2023 0654  Gross per 24 hour   Intake 943.3 ml   Output 3800 ml   Net -2856.7 ml       GEN:   Awake, alert, in no acute distress, resting comfortably in bed   CV:   Regular rate and rhythm  L:  Symmetric expansion, not labored on room air  Abd:  Soft, appropriately tender palpation along incisions, incisions are clean dry and intact, not obviously distended  Ext:  No cyanosis, clubbing, or edema    Recent labs that are back at this time have been reviewed.           Assessment/ Plan:    Mr. Tejeda is a 53-year-old gentleman who was admitted after undergoing laparoscopic right colectomy on September 6, 2023 for an endoscopically unresectable polyp of the appendiceal orifice     -Postoperative day 2  -Vital signs are stable  -Over 3 L of urine output yesterday.  Decrease IV fluids 20 cc/h  -Advance to full liquid diet  -Mobilize  -Continue PCA for now.  If tolerates fulls may transition to oral pain meds this afternoon  -Awaiting return of bowel function      Rinku Urrutia MD  9/8/2023  07:01 EDT      "

## 2023-09-09 ENCOUNTER — READMISSION MANAGEMENT (OUTPATIENT)
Dept: CALL CENTER | Facility: HOSPITAL | Age: 53
End: 2023-09-09
Payer: COMMERCIAL

## 2023-09-09 VITALS
OXYGEN SATURATION: 95 % | SYSTOLIC BLOOD PRESSURE: 113 MMHG | HEIGHT: 73 IN | TEMPERATURE: 98.1 F | RESPIRATION RATE: 16 BRPM | HEART RATE: 72 BPM | DIASTOLIC BLOOD PRESSURE: 77 MMHG | BODY MASS INDEX: 34.99 KG/M2 | WEIGHT: 264 LBS

## 2023-09-09 LAB
ANION GAP SERPL CALCULATED.3IONS-SCNC: 9 MMOL/L (ref 5–15)
BUN SERPL-MCNC: 12 MG/DL (ref 6–20)
BUN/CREAT SERPL: 10.1 (ref 7–25)
CALCIUM SPEC-SCNC: 9.3 MG/DL (ref 8.6–10.5)
CHLORIDE SERPL-SCNC: 104 MMOL/L (ref 98–107)
CO2 SERPL-SCNC: 27 MMOL/L (ref 22–29)
CREAT SERPL-MCNC: 1.19 MG/DL (ref 0.76–1.27)
DEPRECATED RDW RBC AUTO: 45.4 FL (ref 37–54)
EGFRCR SERPLBLD CKD-EPI 2021: 74 ML/MIN/1.73
ERYTHROCYTE [DISTWIDTH] IN BLOOD BY AUTOMATED COUNT: 13.3 % (ref 12.3–15.4)
GLUCOSE SERPL-MCNC: 107 MG/DL (ref 65–99)
HCT VFR BLD AUTO: 46.2 % (ref 37.5–51)
HGB BLD-MCNC: 15.2 G/DL (ref 13–17.7)
MCH RBC QN AUTO: 30.2 PG (ref 26.6–33)
MCHC RBC AUTO-ENTMCNC: 32.9 G/DL (ref 31.5–35.7)
MCV RBC AUTO: 91.7 FL (ref 79–97)
PLATELET # BLD AUTO: 185 10*3/MM3 (ref 140–450)
PMV BLD AUTO: 11.4 FL (ref 6–12)
POTASSIUM SERPL-SCNC: 4.4 MMOL/L (ref 3.5–5.2)
RBC # BLD AUTO: 5.04 10*6/MM3 (ref 4.14–5.8)
SODIUM SERPL-SCNC: 140 MMOL/L (ref 136–145)
WBC NRBC COR # BLD: 10.28 10*3/MM3 (ref 3.4–10.8)

## 2023-09-09 PROCEDURE — 25010000002 HEPARIN (PORCINE) PER 1000 UNITS: Performed by: SURGERY

## 2023-09-09 PROCEDURE — 25010000002 METOCLOPRAMIDE PER 10 MG: Performed by: SURGERY

## 2023-09-09 PROCEDURE — 85027 COMPLETE CBC AUTOMATED: CPT | Performed by: SURGERY

## 2023-09-09 PROCEDURE — 80048 BASIC METABOLIC PNL TOTAL CA: CPT | Performed by: SURGERY

## 2023-09-09 RX ORDER — ONDANSETRON 4 MG/1
4 TABLET, FILM COATED ORAL EVERY 8 HOURS PRN
Qty: 12 TABLET | Refills: 0 | Status: SHIPPED | OUTPATIENT
Start: 2023-09-09 | End: 2024-09-08

## 2023-09-09 RX ORDER — OXYCODONE HYDROCHLORIDE AND ACETAMINOPHEN 5; 325 MG/1; MG/1
1 TABLET ORAL EVERY 4 HOURS PRN
Qty: 14 TABLET | Refills: 0 | Status: SHIPPED | OUTPATIENT
Start: 2023-09-09 | End: 2023-09-18

## 2023-09-09 RX ORDER — DOCUSATE SODIUM 100 MG/1
100 CAPSULE, LIQUID FILLED ORAL 2 TIMES DAILY
Qty: 30 CAPSULE | Refills: 1 | Status: SHIPPED | OUTPATIENT
Start: 2023-09-09 | End: 2024-09-08

## 2023-09-09 RX ADMIN — Medication 10 ML: at 09:02

## 2023-09-09 RX ADMIN — METOCLOPRAMIDE 10 MG: 5 INJECTION, SOLUTION INTRAMUSCULAR; INTRAVENOUS at 05:54

## 2023-09-09 RX ADMIN — OXYCODONE HYDROCHLORIDE AND ACETAMINOPHEN 1 TABLET: 5; 325 TABLET ORAL at 02:26

## 2023-09-09 RX ADMIN — HEPARIN SODIUM 5000 UNITS: 5000 INJECTION, SOLUTION INTRAVENOUS; SUBCUTANEOUS at 05:54

## 2023-09-09 RX ADMIN — ALVIMOPAN 12 MG: 12 CAPSULE ORAL at 09:01

## 2023-09-09 NOTE — DISCHARGE INSTRUCTIONS
refrain from lifting more than 10 pounds for the next 4 weeks.   If worsening problems with fever, chills, nausea or vomiting he is to contact Dr. Urrutia's office and a contact card has been provided   okay to shower.    Ok to allow warm soapy water run over the incisions.  Pat dry do not scrub.  No tub baths for the next 2 weeks.

## 2023-09-09 NOTE — PLAN OF CARE
Problem: Adult Inpatient Plan of Care  Goal: Plan of Care Review  Outcome: Ongoing, Progressing  Flowsheets (Taken 9/9/2023 0520)  Progress: improving  Outcome Evaluation: Pt states pain is improving from surgery. Pt slept well through out the night.  Goal: Patient-Specific Goal (Individualized)  Outcome: Ongoing, Progressing  Goal: Absence of Hospital-Acquired Illness or Injury  Outcome: Ongoing, Progressing  Intervention: Identify and Manage Fall Risk  Recent Flowsheet Documentation  Taken 9/9/2023 0400 by Yoel Cruz RN  Safety Promotion/Fall Prevention:   safety round/check completed   room organization consistent   nonskid shoes/slippers when out of bed   assistive device/personal items within reach   clutter free environment maintained  Taken 9/9/2023 0200 by Yoel Cruz RN  Safety Promotion/Fall Prevention:   safety round/check completed   room organization consistent   nonskid shoes/slippers when out of bed   assistive device/personal items within reach   clutter free environment maintained  Taken 9/9/2023 0000 by Yoel Cruz RN  Safety Promotion/Fall Prevention:   safety round/check completed   nonskid shoes/slippers when out of bed   assistive device/personal items within reach   clutter free environment maintained  Taken 9/8/2023 2200 by Yoel Cruz, FELICIA  Safety Promotion/Fall Prevention:   safety round/check completed   room organization consistent   nonskid shoes/slippers when out of bed   assistive device/personal items within reach   clutter free environment maintained   fall prevention program maintained  Taken 9/8/2023 2000 by Yoel Cruz, RN  Safety Promotion/Fall Prevention:   safety round/check completed   room organization consistent   nonskid shoes/slippers when out of bed   assistive device/personal items within reach   clutter free environment maintained  Intervention: Prevent Skin Injury  Recent Flowsheet Documentation  Taken 9/9/2023 0400 by Yoel Cruz, RN  Body Position:  position changed independently  Taken 9/9/2023 0200 by Yoel Cruz RN  Body Position: position changed independently  Taken 9/9/2023 0000 by Yoel Cruz RN  Body Position: position changed independently  Taken 9/8/2023 2200 by Yoel Cruz RN  Body Position: position changed independently  Taken 9/8/2023 2000 by Yoel Cruz RN  Body Position: position changed independently  Skin Protection:   adhesive use limited   tubing/devices free from skin contact  Intervention: Prevent and Manage VTE (Venous Thromboembolism) Risk  Recent Flowsheet Documentation  Taken 9/9/2023 0400 by Yoel Cruz RN  Activity Management: up ad diane  Taken 9/9/2023 0200 by Yoel Cruz RN  Activity Management: up ad diane  Taken 9/9/2023 0000 by Yoel Cruz RN  Activity Management: up ad diane  Taken 9/8/2023 2200 by Yoel Cruz RN  Activity Management: up ad diane  Taken 9/8/2023 2000 by Yoel Cruz RN  Activity Management: up ad diane  Goal: Optimal Comfort and Wellbeing  Outcome: Ongoing, Progressing  Goal: Readiness for Transition of Care  Outcome: Ongoing, Progressing     Problem: Pain Acute  Goal: Acceptable Pain Control and Functional Ability  Outcome: Ongoing, Progressing     Problem: Infection  Goal: Absence of Infection Signs and Symptoms  Outcome: Ongoing, Progressing     Problem: Fall Injury Risk  Goal: Absence of Fall and Fall-Related Injury  Outcome: Ongoing, Progressing  Intervention: Promote Injury-Free Environment  Recent Flowsheet Documentation  Taken 9/9/2023 0400 by Yoel Cruz RN  Safety Promotion/Fall Prevention:   safety round/check completed   room organization consistent   nonskid shoes/slippers when out of bed   assistive device/personal items within reach   clutter free environment maintained  Taken 9/9/2023 0200 by Yoel Cruz RN  Safety Promotion/Fall Prevention:   safety round/check completed   room organization consistent   nonskid shoes/slippers when out of bed   assistive device/personal  items within reach   clutter free environment maintained  Taken 9/9/2023 0000 by Yoel Cruz, RN  Safety Promotion/Fall Prevention:   safety round/check completed   nonskid shoes/slippers when out of bed   assistive device/personal items within reach   clutter free environment maintained  Taken 9/8/2023 2200 by Yoel Cruz, RN  Safety Promotion/Fall Prevention:   safety round/check completed   room organization consistent   nonskid shoes/slippers when out of bed   assistive device/personal items within reach   clutter free environment maintained   fall prevention program maintained  Taken 9/8/2023 2000 by Yoel Cruz, RN  Safety Promotion/Fall Prevention:   safety round/check completed   room organization consistent   nonskid shoes/slippers when out of bed   assistive device/personal items within reach   clutter free environment maintained   Goal Outcome Evaluation:           Progress: improving  Outcome Evaluation: Pt states pain is improving from surgery. Pt slept well through out the night.

## 2023-09-09 NOTE — DISCHARGE SUMMARY
Discharge Summary    Patient Name:  Tank Tejeda  YOB: 1970  [id number]      DATE OF ADMISSION: 9/6/2023    DATE OF DISCHARGE: 9/9/2023       FINAL DIAGNOSES:     Colon polyp       CONSULTING SERVICES: None      PROCEDURES PERFORMED:   Robotic Assisted Laparoscopic Right Colectomy     HISTORY: The patient is a very pleasant 53 y.o. male with a history of endoscopically unresectable polyp of the appendiceal orifice.  He presented on September 6 for planned resection.      HOSPITAL COURSE: Tank Tejeda was taken to the operating room on September 6, 2023 where they underwent robotic assisted laparoscopic right colectomy.. They tolerated the procedure without difficulty and were transported to the floor postoperatively in stable condition. Over the following several days they progressed.  He experienced return of bowel function on postop day 2 with a bowel movement and had an additional small bowel movement on the morning of postoperative day 3.  He was able to tolerate an advancing diet without difficulty.  He was weaned off of PCA and pain was controlled on oral pain medicine.  His labs on postoperative day 1 and 3 were appropriate.  On 9/9/2023 they were deemed appropriate for discharge in stable condition after having achieved maximal benefit of their hospital stay.      CONDITION: At the time of discharge, the patient is tolerating a regular diet without difficulty. he is having normal bowel and bladder function. his incisions are clean, dry and intact. Pain is well controlled.       DISCHARGE MEDICATIONS:   1. All previous home medications.   2. Percocet 5 - 10 mg PO  Q4h  PRN pain   3. Colace 100mg PO BID  4. Ondansetron 4 mg Q6h PRN nausea     DISCHARGE INSTRUCTIONS:  1. The patient is instructed to follow up with Dr. Urrutia in 2 weeks’ time.  2. he is instructed to refrain from lifting more than 10 pounds for the next 4 weeks.  3. If the patient has worsening problems with fever, chills, nausea  or vomiting he is to contact Dr. Urrutia's office and a contact card has been provided.  4. They have been instructed that it is okay to shower.  Let warm soapy water run over the incisions.  Pat dry do not scrub.  No tub baths for the next 2 weeks.      Rinku Urrutia MD  9/9/2023  09:39 EDT

## 2023-09-09 NOTE — OUTREACH NOTE
Prep Survey      Flowsheet Row Responses   [facility] patient discharged from? [place]   Is LACE score < 7 ? Yes   Eligibility [facility]   Date of Admission 09/06/23   Date of Discharge 09/09/23   Discharge Disposition Home or Self Care   Discharge diagnosis Robotic Assisted Laparoscopic Right Colectomy   Does the patient have one of the following disease processes/diagnoses(primary or secondary)? General Surgery   Does the patient have Home health ordered? No   Is there a DME ordered? No   Prep survey completed? Yes            LUKE CASTRO - Registered Nurse

## 2023-09-09 NOTE — PROGRESS NOTES
"Patient Name:  Tank Tejeda  YOB: 1970  [id number]    Surgery Progress Note    Date of visit: 9/9/2023      Subjective: No acute events overnight.  Tells me that pain is okay.  No nausea or vomiting.  No chills.  No further bowel movements but did have one yesterday.  Passing flatus on multiple occasions.  Has tolerated small amounts of regular diet          Objective:     /77 (BP Location: Right arm, Patient Position: Lying)   Pulse 62   Temp 98.1 °F (36.7 °C) (Oral)   Resp 16   Ht 185.4 cm (72.99\")   Wt 120 kg (264 lb)   SpO2 98%   BMI 34.84 kg/m²     Intake/Output Summary (Last 24 hours) at 9/9/2023 0805  Last data filed at 9/8/2023 1653  Gross per 24 hour   Intake 620 ml   Output 700 ml   Net -80 ml       GEN:   Awake, alert, in no acute distress, resting comfortably in bed   CV:   Regular rate and rhythm  L:  Symmetric expansion, not labored on room air  Abd:  Soft, appropriately tender palpation along incisions, incisions are clean dry intact, nondistended  Ext:  No cyanosis, clubbing, or edema    Recent labs that are back at this time have been reviewed.           Assessment/ Plan:    Mr. Tejeda is a 53-year-old gentleman who was admitted after undergoing laparoscopic right colectomy on September 6, 2023 for an endoscopically unresectable polyp of the appendiceal orifice     -Postoperative day 3  -Vital signs are stable  -Labs today are appropriate.  White blood cell count is 10.  -Continue regular diet and oral pain meds  -Possibly home later today if continues to improve         Rinku Urrutia MD  9/9/2023  08:05 EDT      "

## 2023-09-10 ENCOUNTER — TRANSITIONAL CARE MANAGEMENT TELEPHONE ENCOUNTER (OUTPATIENT)
Dept: CALL CENTER | Facility: HOSPITAL | Age: 53
End: 2023-09-10
Payer: COMMERCIAL

## 2023-09-10 NOTE — OUTREACH NOTE
Call Center TCM Note      Flowsheet Row Responses   [facility] patient discharged from? [place]   Does the patient have one of the following disease processes/diagnoses(primary or secondary)? General Surgery   TCM attempt successful? Yes   Call start time 1106   Call end time 1111   Discharge diagnosis Robotic Assisted Laparoscopic Right Colectomy   Meds reviewed with patient/caregiver? Yes   Is the patient having any side effects they believe may be caused by any medication additions or changes? No   Does the patient have all medications related to this admission filled (includes all antibiotics, pain medications, etc.) Yes   Is the patient taking all medications as directed (includes completed medication regime)? Yes   Comments Advised to contact surgeon's office for Post Op appt. He reports will keep appt. for Physical 9/28/23 @ 3:45pm with PCP.   Does the patient have an appointment with their PCP within 7-14 days of discharge? No   Nursing Interventions Patient declined scheduling/rescheduling appointment at this time   Has home health visited the patient within 72 hours of discharge? N/A   Psychosocial issues? No   Did the patient receive a copy of their discharge instructions? Yes   Nursing interventions Reviewed instructions with patient   What is the patient's perception of their health status since discharge? Improving   Nursing interventions Nurse provided patient education   Is the patient /caregiver able to teach back basic post-op care? Take showers only when approved by MD-sponge bathe until then, Keep incision areas clean,dry and protected, Lifting as instructed by MD in discharge instructions   Is the patient/caregiver able to teach back signs and symptoms of incisional infection? Increased redness, swelling or pain at the incisonal site, Increased drainage or bleeding, Fever   Is the patient/caregiver able to teach back steps to recovery at home? Eat a well-balance diet, Rest and rebuild  strength, gradually increase activity   If the patient is a current smoker, are they able to teach back resources for cessation? Not a smoker   Is the patient/caregiver able to teach back the hierarchy of who to call/visit for symptoms/problems? PCP, Specialist, Home health nurse, Urgent Care, ED, 911 Yes   TCM call completed? Yes   Wrap up additional comments Pt. reports doing well. Inquired how long to keep abdominal binder on. Advised to contact Surgeon's office tomorrow to clarify, pt. v/u.   Call end time 1111   Would this patient benefit from a Referral to Amb Social Work? No   Is the patient interested in additional calls from an ambulatory ? No            Kellie Gerardo RN    9/10/2023, 11:15 EDT

## 2023-09-28 ENCOUNTER — OFFICE VISIT (OUTPATIENT)
Dept: FAMILY MEDICINE CLINIC | Facility: CLINIC | Age: 53
End: 2023-09-28
Payer: COMMERCIAL

## 2023-09-28 VITALS
TEMPERATURE: 98.4 F | SYSTOLIC BLOOD PRESSURE: 114 MMHG | OXYGEN SATURATION: 98 % | DIASTOLIC BLOOD PRESSURE: 76 MMHG | BODY MASS INDEX: 33.88 KG/M2 | WEIGHT: 255.6 LBS | HEART RATE: 100 BPM | RESPIRATION RATE: 21 BRPM | HEIGHT: 73 IN

## 2023-09-28 DIAGNOSIS — L20.84 INTRINSIC ECZEMA: ICD-10-CM

## 2023-09-28 DIAGNOSIS — K63.5 POLYP OF COLON, UNSPECIFIED PART OF COLON, UNSPECIFIED TYPE: Primary | ICD-10-CM

## 2023-09-28 PROBLEM — R94.31 ABNORMAL EKG: Status: RESOLVED | Noted: 2023-09-01 | Resolved: 2023-09-28

## 2023-09-28 RX ORDER — CLOBETASOL PROPIONATE 0.5 MG/G
1 OINTMENT TOPICAL 2 TIMES DAILY
Qty: 1 EACH | Refills: 1 | Status: SHIPPED | OUTPATIENT
Start: 2023-09-28

## 2023-09-28 RX ORDER — OXYCODONE HYDROCHLORIDE AND ACETAMINOPHEN 5; 325 MG/1; MG/1
1 TABLET ORAL ONCE AS NEEDED
COMMUNITY
Start: 2023-09-19 | End: 2023-09-28

## 2023-09-28 NOTE — PROGRESS NOTES
"Chief Complaint  Diabetes    Diabetes      He underwent partial colectomy on the 6th. He is feeling better since Sunday. He says he now feels great. His bms are normal and constipation has resolved. No fever. He is no longer taking percocet. He denies any cp sob. He did some walking on the treadmill today.     He has had eczema outbreak on foot and needs clobetasol. He says this is the only thing that helps.      The following portions of the patient's history were reviewed and updated as appropriate: allergies, current medications, past family history, past medical history, past social history, past surgical history, and problem list.    OBJECTIVE:  /76   Pulse 100   Temp 98.4 °F (36.9 °C) (Temporal)   Resp 21   Ht 185.4 cm (72.99\")   Wt 116 kg (255 lb 9.6 oz)   SpO2 98%   BMI 33.73 kg/m²       Physical Exam  Constitutional:       General: He is not in acute distress.     Appearance: Normal appearance.   HENT:      Head: Normocephalic and atraumatic.   Eyes:      Extraocular Movements: Extraocular movements intact.   Cardiovascular:      Rate and Rhythm: Normal rate and regular rhythm.      Heart sounds: No murmur heard.  Pulmonary:      Effort: Pulmonary effort is normal. No respiratory distress.      Breath sounds: Normal breath sounds. No stridor. No wheezing, rhonchi or rales.   Abdominal:      General: Bowel sounds are normal.      Palpations: Abdomen is soft.      Tenderness: There is no abdominal tenderness. There is no guarding or rebound.   Skin:     Findings: No rash.   Neurological:      General: No focal deficit present.      Mental Status: He is alert.   Psychiatric:         Mood and Affect: Mood normal.        Eczema changes on right foot                Assessment and Plan   Diagnoses and all orders for this visit:    1. Polyp of colon, unspecified part of colon, unspecified type (Primary)    2. Intrinsic eczema    Other orders  -     clobetasol (TEMOVATE) 0.05 % ointment; Apply 1 " application  topically to the appropriate area as directed 2 (Two) Times a Day.  Dispense: 1 each; Refill: 1      Reviewed his dc summary cbc bmp.   We discussed recheck of lipid today given weight loss, he will consider at follow up.       Counseled on skin color changes with steroid cream or possible infection use less than 2 weeks at a time.       Return in about 3 months (around 12/28/2023).       Alice Mantilla D.O.  [facility] [place]

## 2023-10-19 ENCOUNTER — OFFICE VISIT (OUTPATIENT)
Dept: FAMILY MEDICINE CLINIC | Facility: CLINIC | Age: 53
End: 2023-10-19
Payer: COMMERCIAL

## 2023-10-19 VITALS
SYSTOLIC BLOOD PRESSURE: 130 MMHG | BODY MASS INDEX: 34.57 KG/M2 | WEIGHT: 260.8 LBS | TEMPERATURE: 98.2 F | DIASTOLIC BLOOD PRESSURE: 80 MMHG | HEART RATE: 84 BPM | OXYGEN SATURATION: 98 % | RESPIRATION RATE: 20 BRPM | HEIGHT: 73 IN

## 2023-10-19 DIAGNOSIS — R19.7 DIARRHEA, UNSPECIFIED TYPE: Primary | ICD-10-CM

## 2023-10-19 NOTE — PROGRESS NOTES
"Chief Complaint  frequent bowel movements    History of Present Illness    Loose stool  After surgery he has loose stool but it improved and now the loose stool is back. He took two imodium yesterday which helped. He doesn't feel this is post surgery because he had these issues for. No fevers. No belly pain. No nausea or vomiting. This came back about two weeks ago. Fast food makes this worse or when he cooks which is majority of the time. He is lactose intolerant but he has not consumed daily. No bloody or black stool. He can go as many as ten times per day .       The following portions of the patient's history were reviewed and updated as appropriate: allergies, current medications, past family history, past medical history, past social history, past surgical history, and problem list.    OBJECTIVE:  /80   Pulse 84   Temp 98.2 °F (36.8 °C) (Temporal)   Resp 20   Ht 185.4 cm (72.99\")   Wt 118 kg (260 lb 12.8 oz)   SpO2 98%   BMI 34.42 kg/m²       Physical Exam  Constitutional:       General: He is not in acute distress.     Appearance: Normal appearance.   HENT:      Head: Normocephalic and atraumatic.   Eyes:      Extraocular Movements: Extraocular movements intact.   Cardiovascular:      Rate and Rhythm: Normal rate and regular rhythm.      Heart sounds: No murmur heard.  Pulmonary:      Effort: Pulmonary effort is normal. No respiratory distress.      Breath sounds: Normal breath sounds. No stridor. No wheezing, rhonchi or rales.   Abdominal:      General: Bowel sounds are normal.      Palpations: Abdomen is soft.      Tenderness: There is no abdominal tenderness. There is no guarding or rebound.   Skin:     Findings: No rash.   Neurological:      General: No focal deficit present.      Mental Status: He is alert.   Psychiatric:         Mood and Affect: Mood normal.                    Assessment and Plan   Diagnoses and all orders for this visit:    1. Diarrhea, unspecified type (Primary)  -     " Pancreatic Elastase, Fecal - Stool, Per Rectum; Future  -     Fecal Fat, Qualitative - Stool, Per Rectum; Future  -     Calprotectin, Fecal - Stool, Per Rectum; Future  -     Clostridioides difficile Toxin, PCR - Stool, Per Rectum; Future  -     Stool Culture (Reference Lab) - Stool, Per Rectum; Future  -     Gastrointestinal Panel, PCR - Stool, Per Rectum; Future            Celiac was negative.   Advised him to go to er for fever or belly pain.   Check stool studies per above.     Return in about 1 month (around 11/19/2023).       Alice Mantilla D.O.  [facility] [place]

## 2023-10-21 ENCOUNTER — LAB (OUTPATIENT)
Dept: LAB | Facility: HOSPITAL | Age: 53
End: 2023-10-21
Payer: COMMERCIAL

## 2023-10-21 DIAGNOSIS — R19.7 DIARRHEA, UNSPECIFIED TYPE: ICD-10-CM

## 2023-10-21 LAB
ADV 40+41 DNA STL QL NAA+NON-PROBE: NOT DETECTED
ASTRO TYP 1-8 RNA STL QL NAA+NON-PROBE: NOT DETECTED
C CAYETANENSIS DNA STL QL NAA+NON-PROBE: NOT DETECTED
C COLI+JEJ+UPSA DNA STL QL NAA+NON-PROBE: NOT DETECTED
C DIFF TOX GENS STL QL NAA+PROBE: NOT DETECTED
CRYPTOSP DNA STL QL NAA+NON-PROBE: NOT DETECTED
E HISTOLYT DNA STL QL NAA+NON-PROBE: NOT DETECTED
EAEC PAA PLAS AGGR+AATA ST NAA+NON-PRB: NOT DETECTED
EC STX1+STX2 GENES STL QL NAA+NON-PROBE: NOT DETECTED
EPEC EAE GENE STL QL NAA+NON-PROBE: NOT DETECTED
ETEC LTA+ST1A+ST1B TOX ST NAA+NON-PROBE: NOT DETECTED
FATTY ACIDS: NORMAL
G LAMBLIA DNA STL QL NAA+NON-PROBE: NOT DETECTED
NEUTRAL FATS: NORMAL
NOROVIRUS GI+II RNA STL QL NAA+NON-PROBE: NOT DETECTED
P SHIGELLOIDES DNA STL QL NAA+NON-PROBE: NOT DETECTED
RVA RNA STL QL NAA+NON-PROBE: NOT DETECTED
S ENT+BONG DNA STL QL NAA+NON-PROBE: NOT DETECTED
SAPO I+II+IV+V RNA STL QL NAA+NON-PROBE: NOT DETECTED
SHIGELLA SP+EIEC IPAH ST NAA+NON-PROBE: NOT DETECTED
V CHOL+PARA+VUL DNA STL QL NAA+NON-PROBE: NOT DETECTED
V CHOLERAE DNA STL QL NAA+NON-PROBE: NOT DETECTED
Y ENTEROCOL DNA STL QL NAA+NON-PROBE: NOT DETECTED

## 2023-10-21 PROCEDURE — 82653 EL-1 FECAL QUANTITATIVE: CPT

## 2023-10-21 PROCEDURE — 87046 STOOL CULTR AEROBIC BACT EA: CPT

## 2023-10-21 PROCEDURE — 83993 ASSAY FOR CALPROTECTIN FECAL: CPT

## 2023-10-21 PROCEDURE — 87507 IADNA-DNA/RNA PROBE TQ 12-25: CPT

## 2023-10-21 PROCEDURE — 87045 FECES CULTURE AEROBIC BACT: CPT

## 2023-10-21 PROCEDURE — 89125 SPECIMEN FAT STAIN: CPT

## 2023-10-21 PROCEDURE — 87427 SHIGA-LIKE TOXIN AG IA: CPT

## 2023-10-21 PROCEDURE — 87493 C DIFF AMPLIFIED PROBE: CPT

## 2023-10-24 DIAGNOSIS — K52.9 CHRONIC DIARRHEA: Primary | ICD-10-CM

## 2023-10-24 LAB — ELASTASE PANC STL-MCNT: 270 UG ELAST./G

## 2023-10-25 LAB
BACTERIA SPEC CULT: NORMAL
BACTERIA SPEC CULT: NORMAL
CALPROTECTIN STL-MCNT: 17 UG/G (ref 0–120)
CAMPYLOBACTER STL CULT: NORMAL
E COLI SXT STL QL IA: NEGATIVE
SALM + SHIG STL CULT: NORMAL

## 2023-11-20 ENCOUNTER — OFFICE VISIT (OUTPATIENT)
Dept: GASTROENTEROLOGY | Facility: CLINIC | Age: 53
End: 2023-11-20
Payer: COMMERCIAL

## 2023-11-20 VITALS
TEMPERATURE: 97.3 F | WEIGHT: 265 LBS | SYSTOLIC BLOOD PRESSURE: 130 MMHG | HEIGHT: 73 IN | OXYGEN SATURATION: 99 % | DIASTOLIC BLOOD PRESSURE: 82 MMHG | HEART RATE: 96 BPM | BODY MASS INDEX: 35.12 KG/M2

## 2023-11-20 DIAGNOSIS — K58.0 IRRITABLE BOWEL SYNDROME WITH DIARRHEA: Primary | ICD-10-CM

## 2023-11-20 DIAGNOSIS — Z90.49 H/O RIGHT HEMICOLECTOMY: ICD-10-CM

## 2023-11-20 NOTE — PROGRESS NOTES
Follow Up      Patient Name: Tank Tejeda  : 1970   MRN: [id number]     Chief Complaint:  No chief complaint on file.      History of Present Illness: Tank Tejeda is a 53 y.o. male, PMH includes anxiety, depression, GERD, HL, T2DM, who is here today for follow up on diarrhea.     CSY 2023 with Dr. Hernandez. Adequate bowel prep conditions. Internal hemorrhoids. 30mm polyp (tubular adenoma) arising from appendiceal orifice from within the cecum. Area was injected with 5mm saline for lesion assessment, but lesion could not be lifted adequately.     Pt underwent laparascopic right hemicolectomy with primary isoperistaltic ileocolic to proximal transverse colon anastomosis 23.     Pt was seen by PCP 10/19/23 for recurrent diarrhea, which he states has been a long term intermittent issue. Stool culture, gastrointestinal PCR, C difficile, fecal calprotectin, pancreatic elastase within normal limits. He notes lifelong issues with intermittent diarrhea, up to 6-10 large, loose BM per day that occur about 50% of the days per month. He identifies stress, dairy and greasy foods to be particular triggers. He finds some relief with use of OTC antidiarrheal medications as needed.     Patient denies associated fever, chills, abdominal pain, indigestion, nausea, vomiting, constipation, hematemesis, dysphagia, hematochezia, melena, bloating, weight loss or gain, dysuria, jaundice or bruising.    Subjective      Review of Systems:   Review of Systems   Constitutional:  Negative for appetite change, chills, diaphoresis, fatigue, fever, unexpected weight gain and unexpected weight loss.   HENT:  Negative for drooling, facial swelling, mouth sores, nosebleeds, rhinorrhea, sore throat, swollen glands, tinnitus and trouble swallowing.    Eyes: Negative.    Respiratory:  Negative for choking, chest tightness and shortness of breath.    Gastrointestinal:  Positive for diarrhea. Negative for abdominal pain, anal bleeding,  blood in stool, constipation, nausea, vomiting, GERD and indigestion.   Endocrine: Negative.    Genitourinary:  Negative for dysuria, flank pain and hematuria.   Musculoskeletal:  Negative for arthralgias, back pain, myalgias and neck pain.   Skin:  Negative for color change, dry skin, pallor and bruise.   Neurological:  Negative for dizziness, tremors, syncope, weakness and numbness.   Psychiatric/Behavioral:  Negative for decreased concentration, hallucinations and self-injury. The patient is not nervous/anxious.    All other systems reviewed and are negative.      Medications:     Current Outpatient Medications:     clobetasol (TEMOVATE) 0.05 % ointment, Apply 1 application  topically to the appropriate area as directed 2 (Two) Times a Day., Disp: 1 each, Rfl: 1    Diclofenac Sodium (VOLTAREN) 1 % gel gel, Apply 4 g topically to the appropriate area as directed 2 (Two) Times a Day., Disp: 100 g, Rfl: 2    multivitamin with minerals tablet tablet, Take 1 tablet by mouth Daily., Disp: , Rfl:     valACYclovir (Valtrex) 500 MG tablet, Take 1 tablet by mouth Daily. (Patient taking differently: Take 1 tablet by mouth As Needed.), Disp: 30 tablet, Rfl: 3    Allergies:   Allergies   Allergen Reactions    Trazodone Anaphylaxis    Lactose Intolerance (Gi) Diarrhea    Wellbutrin [Bupropion] Other (See Comments)     Panic attacks         Social History:   Social History     Socioeconomic History    Marital status:    Tobacco Use    Smoking status: Former     Packs/day: 1.00     Years: 27.00     Additional pack years: 0.00     Total pack years: 27.00     Types: Cigarettes     Quit date: 6/15/2018     Years since quittin.4     Passive exposure: Past    Smokeless tobacco: Never   Vaping Use    Vaping Use: Never used   Substance and Sexual Activity    Alcohol use: No    Drug use: Not Currently     Comment: alcohol abuse for years    Sexual activity: Defer     Partners: Female     Birth control/protection: Abstinence         Surgical History:   Past Surgical History:   Procedure Laterality Date    COLON RESECTION N/A 9/6/2023    Procedure: COLON RESECTION LAPAROSCOPIC RIGHT WITH DAVINCI ROBOT;  Surgeon: Rinku Urrutia MD;  [facility]: [facility];  Service: Robotics - DaVinci;  Laterality: N/A;    COLONOSCOPY      CYST REMOVAL Right         Medical History:   Past Medical History:   Diagnosis Date    Allergic     Anxiety always    Arthritis     Bell's palsy     2018    Depression     Diabetes mellitus     GERD (gastroesophageal reflux disease) 2007    Hyperlipidemia     Pneumonia 1994    Seasonal allergies     Substance abuse 2000    Tremor 2007    Visual impairment 2013        Objective     Physical Exam:  Vital Signs: There were no vitals filed for this visit.  There is no height or weight on file to calculate BMI.     Physical Exam  Vitals and nursing note reviewed.   Constitutional:       General: He is not in acute distress.     Appearance: Normal appearance. He is not ill-appearing or diaphoretic.      Comments: BMI 34.97. Pleasantly conversant.    HENT:      Head: Normocephalic and atraumatic.      Right Ear: External ear normal.      Left Ear: External ear normal.      Nose: Nose normal.      Mouth/Throat:      Mouth: Mucous membranes are moist.      Pharynx: Oropharynx is clear.   Eyes:      Conjunctiva/sclera: Conjunctivae normal.      Pupils: Pupils are equal, round, and reactive to light.   Cardiovascular:      Rate and Rhythm: Normal rate and regular rhythm.      Pulses: Normal pulses.      Heart sounds: Normal heart sounds.   Pulmonary:      Effort: Pulmonary effort is normal.      Breath sounds: Normal breath sounds.   Abdominal:      General: Abdomen is flat. There is no distension.      Tenderness: There is no abdominal tenderness. There is no guarding or rebound.   Musculoskeletal:         General: Normal range of motion.      Cervical back: Normal range of motion.   Skin:     General: Skin is warm and dry.       Coloration: Skin is not jaundiced or pale.   Neurological:      General: No focal deficit present.      Mental Status: He is alert and oriented to person, place, and time. Mental status is at baseline.   Psychiatric:         Mood and Affect: Mood normal.         Assessment / Plan      Assessment/Plan:   There are no diagnoses linked to this encounter.     IBS-D  H/o right hemicolectomy 9/2023  Tubular adenoma of colon   - rx for Xifaxan 550mg TID x 14 days sent to pharmacy    - previous labs, imaging, endoscopy, pathology, operative notes reviewed   - follow up in clinic in 6 weeks, or after completion of above studies   - call clinic at any time for questions or new / worsened sx    Follow Up:   Return in about 6 weeks (around 1/1/2024).    Plan of care reviewed with the patient at the conclusion of today's visit.  Education was provided regarding diagnosis, management, and any prescribed or recommended OTC medications.  Patient verbalized understanding of and agreement with management plan.     NOTE TO PATIENT: The 21st Century Cures Act makes medical notes like these available to patients in the interest of transparency. However, be advised this is a medical document. It is intended as peer to peer communication. It is written in medical language and may contain abbreviations or verbiage that are unfamiliar. It may appear blunt or direct. Medical documents are intended to carry relevant information, facts as evident, and the clinical opinion of the practitioner.     Time Statement:   Discussed plan of care in detail with patient today. Patient verbally understands and agrees. I have spent 30 minutes reviewing available diagnostics, obtaining history, examining the patient, developing a treatment plan, and educating the patient on disease process and plan of care.     Leny Arnett PA-C   [facility]

## 2023-12-17 ENCOUNTER — HOSPITAL ENCOUNTER (EMERGENCY)
Facility: HOSPITAL | Age: 53
Discharge: HOME | End: 2023-12-17
Attending: EMERGENCY MEDICINE | Admitting: EMERGENCY MEDICINE
Payer: COMMERCIAL

## 2023-12-17 VITALS
BODY MASS INDEX: 34.46 KG/M2 | OXYGEN SATURATION: 100 % | TEMPERATURE: 98.1 F | HEART RATE: 101 BPM | DIASTOLIC BLOOD PRESSURE: 113 MMHG | WEIGHT: 260 LBS | HEIGHT: 73 IN | SYSTOLIC BLOOD PRESSURE: 163 MMHG | RESPIRATION RATE: 16 BRPM

## 2023-12-17 DIAGNOSIS — F43.9 STRESS: ICD-10-CM

## 2023-12-17 DIAGNOSIS — R45.0 FEELING JITTERY: Primary | ICD-10-CM

## 2023-12-17 DIAGNOSIS — E11.649 HYPOGLYCEMIC EPISODE IN PATIENT WITH DIABETES MELLITUS: ICD-10-CM

## 2023-12-17 LAB
ALBUMIN SERPL-MCNC: 4.3 G/DL (ref 3.5–5.2)
ALBUMIN/GLOB SERPL: 1.4 G/DL
ALP SERPL-CCNC: 95 U/L (ref 39–117)
ALT SERPL W P-5'-P-CCNC: 47 U/L (ref 1–41)
ANION GAP SERPL CALCULATED.3IONS-SCNC: 11 MMOL/L (ref 5–15)
AST SERPL-CCNC: 27 U/L (ref 1–40)
BASOPHILS # BLD AUTO: 0.04 10*3/MM3 (ref 0–0.2)
BASOPHILS NFR BLD AUTO: 0.6 % (ref 0–1.5)
BILIRUB SERPL-MCNC: 0.8 MG/DL (ref 0–1.2)
BUN SERPL-MCNC: 8 MG/DL (ref 6–20)
BUN/CREAT SERPL: 7.5 (ref 7–25)
CALCIUM SPEC-SCNC: 9 MG/DL (ref 8.6–10.5)
CHLORIDE SERPL-SCNC: 106 MMOL/L (ref 98–107)
CO2 SERPL-SCNC: 23 MMOL/L (ref 22–29)
CREAT SERPL-MCNC: 1.06 MG/DL (ref 0.76–1.27)
DEPRECATED RDW RBC AUTO: 45.8 FL (ref 37–54)
EGFRCR SERPLBLD CKD-EPI 2021: 85 ML/MIN/1.73
EOSINOPHIL # BLD AUTO: 0.16 10*3/MM3 (ref 0–0.4)
EOSINOPHIL NFR BLD AUTO: 2.2 % (ref 0.3–6.2)
ERYTHROCYTE [DISTWIDTH] IN BLOOD BY AUTOMATED COUNT: 13.4 % (ref 12.3–15.4)
GLOBULIN UR ELPH-MCNC: 3 GM/DL
GLUCOSE BLDC GLUCOMTR-MCNC: 102 MG/DL (ref 70–130)
GLUCOSE SERPL-MCNC: 108 MG/DL (ref 65–99)
HBA1C MFR BLD: 6.2 % (ref 4.8–5.6)
HCT VFR BLD AUTO: 47.5 % (ref 37.5–51)
HGB BLD-MCNC: 16 G/DL (ref 13–17.7)
IMM GRANULOCYTES # BLD AUTO: 0.04 10*3/MM3 (ref 0–0.05)
IMM GRANULOCYTES NFR BLD AUTO: 0.6 % (ref 0–0.5)
LYMPHOCYTES # BLD AUTO: 1.98 10*3/MM3 (ref 0.7–3.1)
LYMPHOCYTES NFR BLD AUTO: 27.8 % (ref 19.6–45.3)
MCH RBC QN AUTO: 31.3 PG (ref 26.6–33)
MCHC RBC AUTO-ENTMCNC: 33.7 G/DL (ref 31.5–35.7)
MCV RBC AUTO: 93 FL (ref 79–97)
MONOCYTES # BLD AUTO: 0.54 10*3/MM3 (ref 0.1–0.9)
MONOCYTES NFR BLD AUTO: 7.6 % (ref 5–12)
NEUTROPHILS NFR BLD AUTO: 4.36 10*3/MM3 (ref 1.7–7)
NEUTROPHILS NFR BLD AUTO: 61.2 % (ref 42.7–76)
NRBC BLD AUTO-RTO: 0 /100 WBC (ref 0–0.2)
PLATELET # BLD AUTO: 174 10*3/MM3 (ref 140–450)
PMV BLD AUTO: 11.9 FL (ref 6–12)
POTASSIUM SERPL-SCNC: 3.8 MMOL/L (ref 3.5–5.2)
PROT SERPL-MCNC: 7.3 G/DL (ref 6–8.5)
RBC # BLD AUTO: 5.11 10*6/MM3 (ref 4.14–5.8)
SODIUM SERPL-SCNC: 140 MMOL/L (ref 136–145)
TSH SERPL DL<=0.05 MIU/L-ACNC: 1.63 UIU/ML (ref 0.27–4.2)
WBC NRBC COR # BLD AUTO: 7.12 10*3/MM3 (ref 3.4–10.8)

## 2023-12-17 PROCEDURE — 99282 EMERGENCY DEPT VISIT SF MDM: CPT

## 2023-12-17 PROCEDURE — 82948 REAGENT STRIP/BLOOD GLUCOSE: CPT

## 2023-12-17 PROCEDURE — 83036 HEMOGLOBIN GLYCOSYLATED A1C: CPT | Performed by: EMERGENCY MEDICINE

## 2023-12-17 PROCEDURE — 36415 COLL VENOUS BLD VENIPUNCTURE: CPT

## 2023-12-17 PROCEDURE — 80050 GENERAL HEALTH PANEL: CPT | Performed by: EMERGENCY MEDICINE

## 2023-12-21 NOTE — ED PROVIDER NOTES
"Subjective   History of Present Illness  Patient is a pleasant 53-year-old male who presents to the emergency department with concern for hypoglycemic episode.  He states that he is diet controlled diabetic.  He has been more stressed over the past several days and felt \"jittery\".  Pacing around the house at times.  When he checked his glucose on 2 different glucometer she got conflicting readings.  Given his jitteriness and potential hypoglycemia he presents to the emergency department for evaluation.  Glucose reading was 60 and one of his glucometers and between 90 and 100 on the other glucometer.  Denies fever, chills, chest pain, shortness of breath, abdominal pain, vomiting, diarrhea, or other acute complaints.      Review of Systems   All other systems reviewed and are negative.      Past Medical History:   Diagnosis Date    Allergic     Anxiety always    Arthritis     Bell's palsy     2018    Depression     Diabetes mellitus     GERD (gastroesophageal reflux disease) 2007    Hyperlipidemia     Pneumonia 1994    Seasonal allergies     Substance abuse 2000    Tremor 2007    Visual impairment 2013       Allergies   Allergen Reactions    Trazodone Anaphylaxis    Lactose Intolerance (Gi) Diarrhea    Wellbutrin [Bupropion] Other (See Comments)     Panic attacks         Past Surgical History:   Procedure Laterality Date    COLON RESECTION N/A 9/6/2023    Procedure: COLON RESECTION LAPAROSCOPIC RIGHT WITH DAVINCI ROBOT;  Surgeon: Rinku Urrutia MD;  Location: [facility];  Service: Robotics - DaVinci;  Laterality: N/A;    COLONOSCOPY      CYST REMOVAL Right        Family History   Problem Relation Age of Onset    Depression Mother     Diabetes Father     Alcohol abuse Father     Arthritis Father     Birth defects Maternal Grandfather     Mental illness Maternal Grandfather     Stroke Maternal Grandmother     Vision loss Paternal Grandfather     Cancer Paternal Grandmother     Asthma Sister     Asthma Daughter  "       Social History     Socioeconomic History    Marital status:    Tobacco Use    Smoking status: Former     Packs/day: 1.00     Years: 27.00     Additional pack years: 0.00     Total pack years: 27.00     Types: Cigarettes     Quit date: 6/15/2018     Years since quittin.5     Passive exposure: Past    Smokeless tobacco: Never   Vaping Use    Vaping Use: Never used   Substance and Sexual Activity    Alcohol use: No    Drug use: Not Currently     Comment: alcohol abuse for years    Sexual activity: Defer     Partners: Female     Birth control/protection: Abstinence           Objective   Physical Exam  Vitals and nursing note reviewed.   Constitutional:       General: He is not in acute distress.     Appearance: Normal appearance.   HENT:      Head: Normocephalic and atraumatic.   Eyes:      Conjunctiva/sclera: Conjunctivae normal.      Pupils: Pupils are equal, round, and reactive to light.   Neck:      Thyroid: No thyromegaly.   Cardiovascular:      Rate and Rhythm: Normal rate and regular rhythm.      Heart sounds: Normal heart sounds. No murmur heard.     No friction rub. No gallop.   Pulmonary:      Effort: Pulmonary effort is normal. No respiratory distress.      Breath sounds: Normal breath sounds.   Abdominal:      General: Bowel sounds are normal.      Palpations: Abdomen is soft.      Tenderness: There is no abdominal tenderness.   Musculoskeletal:         General: Normal range of motion.      Cervical back: Normal range of motion and neck supple.   Lymphadenopathy:      Cervical: No cervical adenopathy.   Skin:     General: Skin is warm and dry.      Capillary Refill: Capillary refill takes less than 2 seconds.   Neurological:      General: No focal deficit present.      Mental Status: He is alert and oriented to person, place, and time.   Psychiatric:         Behavior: Behavior normal.         Thought Content: Thought content normal.         Procedures           ED Course  ED Course as of  12/21/23 0506   Sun Dec 17, 2023   1435 Patient's workup is reassuring.  I glucometer did not read a low level.  I glucometer read 108.  Upon further discussion with the patient he has 2 glucometers at home and there was a 30 point disparity between the 2 glucometers.  He was not sure which 1 is correct as the 1 glucometer read around 60 and the other 1 red blood glucose around 90.  I suspect the blood glucose around 90 is most accurate.  Have instructed him to take glucometer to his primary care provider or pharmacist to compare this to their equipment to determine which glucometer is most accurate.  He does not have them with him tonight in the emergency department.  Patient does admit to history of stress and anxiety and his symptoms could be associated or contributed to by that.  He understands that he does not have a definitive diagnosis today and especially with this being the case she should have a low threshold to return to the emergency department if symptoms persist, worsen, or other concerns arise. [CP]      ED Course User Index  [CP] Jered Carey DO                                    Latest Reference Range & Units 12/17/23 17:08   Sodium 136 - 145 mmol/L 140   Potassium 3.5 - 5.2 mmol/L 3.8   Chloride 98 - 107 mmol/L 106   CO2 22.0 - 29.0 mmol/L 23.0   Anion Gap 5.0 - 15.0 mmol/L 11.0   BUN 6 - 20 mg/dL 8   Creatinine 0.76 - 1.27 mg/dL 1.06   BUN/Creatinine Ratio 7.0 - 25.0  7.5   eGFR >60.0 mL/min/1.73 85.0   Glucose 65 - 99 mg/dL 108 (H)   Calcium 8.6 - 10.5 mg/dL 9.0   Alkaline Phosphatase 39 - 117 U/L 95   Total Protein 6.0 - 8.5 g/dL 7.3   Albumin 3.5 - 5.2 g/dL 4.3   Globulin gm/dL 3.0   A/G Ratio g/dL 1.4   AST (SGOT) 1 - 40 U/L 27   ALT (SGPT) 1 - 41 U/L 47 (H)   Total Bilirubin 0.0 - 1.2 mg/dL 0.8   Hemoglobin A1C 4.80 - 5.60 % 6.20 (H)   TSH Baseline 0.270 - 4.200 uIU/mL 1.630   WBC 3.40 - 10.80 10*3/mm3 7.12   RBC 4.14 - 5.80 10*6/mm3 5.11   Hemoglobin 13.0 - 17.7 g/dL 16.0   Hematocrit 37.5 -  "51.0 % 47.5   Platelets 140 - 450 10*3/mm3 174   RDW 12.3 - 15.4 % 13.4   MCV 79.0 - 97.0 fL 93.0   MCH 26.6 - 33.0 pg 31.3   MCHC 31.5 - 35.7 g/dL 33.7   MPV 6.0 - 12.0 fL 11.9   RDW-SD 37.0 - 54.0 fl 45.8   Neutrophil Rel % 42.7 - 76.0 % 61.2   Lymphocyte Rel % 19.6 - 45.3 % 27.8   Monocyte Rel % 5.0 - 12.0 % 7.6   Eosinophil Rel % 0.3 - 6.2 % 2.2   Basophil Rel % 0.0 - 1.5 % 0.6   Immature Granulocyte Rel % 0.0 - 0.5 % 0.6 (H)   Neutrophils Absolute 1.70 - 7.00 10*3/mm3 4.36   Lymphocytes Absolute 0.70 - 3.10 10*3/mm3 1.98   Monocytes Absolute 0.10 - 0.90 10*3/mm3 0.54   Eosinophils Absolute 0.00 - 0.40 10*3/mm3 0.16   Basophils Absolute 0.00 - 0.20 10*3/mm3 0.04   Immature Grans, Absolute 0.00 - 0.05 10*3/mm3 0.04   nRBC 0.0 - 0.2 /100 WBC 0.0   (H): Data is abnormally high    No orders to display     Vitals:    12/17/23 1631   BP: (!) 163/113   BP Location: Left arm   Patient Position: Sitting   Pulse: 101   Resp: 16   Temp: 98.1 °F (36.7 °C)   TempSrc: Oral   SpO2: 100%   Weight: 118 kg (260 lb)   Height: 185.4 cm (73\")     Medications - No data to display  ECG/EMG Results (last 24 hours)       ** No results found for the last 24 hours. **          No orders to display                 Medical Decision Making  Problems Addressed:  Feeling jittery: complicated acute illness or injury  Hypoglycemic episode in patient with diabetes mellitus: complicated acute illness or injury  Stress: complicated acute illness or injury    Amount and/or Complexity of Data Reviewed  External Data Reviewed: notes.  Labs: ordered. Decision-making details documented in ED Course.        No orders to display     Vitals:    12/17/23 1631   BP: (!) 163/113   BP Location: Left arm   Patient Position: Sitting   Pulse: 101   Resp: 16   Temp: 98.1 °F (36.7 °C)   TempSrc: Oral   SpO2: 100%   Weight: 118 kg (260 lb)   Height: 185.4 cm (73\")     Medications - No data to display  ECG/EMG Results (last 24 hours)       ** No results found for the " last 24 hours. **          No orders to display           Final diagnoses:   Feeling jittery   Hypoglycemic episode in patient with diabetes mellitus   Stress       ED Disposition  ED Disposition       ED Disposition   Discharge    Condition   Stable    Comment   --               Alice Mantilla DO  [address] [address]  [phone]    Schedule an appointment as soon as possible for a visit       [facility] EMERGENCY DEPARTMENT  [address] [address] [phone]    If symptoms worsen         Medication List        Changed      valACYclovir 500 MG tablet  Commonly known as: Valtrex  Take 1 tablet by mouth Daily.  What changed:   when to take this  reasons to take this                 Jered Carey DO  12/21/23 0513

## 2024-01-29 ENCOUNTER — OFFICE VISIT (OUTPATIENT)
Dept: GASTROENTEROLOGY | Facility: CLINIC | Age: 54
End: 2024-01-29
Payer: COMMERCIAL

## 2024-01-29 VITALS
HEIGHT: 73 IN | WEIGHT: 273.4 LBS | SYSTOLIC BLOOD PRESSURE: 150 MMHG | HEART RATE: 102 BPM | BODY MASS INDEX: 36.23 KG/M2 | OXYGEN SATURATION: 98 % | DIASTOLIC BLOOD PRESSURE: 76 MMHG

## 2024-01-29 DIAGNOSIS — K58.0 IRRITABLE BOWEL SYNDROME WITH DIARRHEA: Primary | ICD-10-CM

## 2024-01-29 RX ORDER — MONTELUKAST SODIUM 4 MG/1
1 TABLET, CHEWABLE ORAL 2 TIMES DAILY
Qty: 60 TABLET | Refills: 5 | Status: SHIPPED | OUTPATIENT
Start: 2024-01-29

## 2024-01-29 NOTE — PROGRESS NOTES
Follow Up      Patient Name: Tank Tejeda  : 1970   MRN: [id number]     Chief Complaint:  No chief complaint on file.      History of Present Illness: Tank Tejeda is a 53 y.o. male who is here today for follow up on IBS-D.     Pt completed course of Xifaxan. He continues to have about 5 BM per day that vary from loose to more soft / formed. He continues to experience stool frequency about 50% of the days per month.     Patient denies associated fever, chills, abdominal pain, indigestion, nausea, vomiting, constipation, hematemesis, dysphagia, hematochezia, melena, bloating, weight loss or gain, dysuria, jaundice or bruising.    CSY 2023 with Dr. Hernandez. Adequate bowel prep conditions. Internal hemorrhoids. 30mm polyp (tubular adenoma) arising from appendiceal orifice from within the cecum. Area was injected with 5mm saline for lesion assessment, but lesion could not be lifted adequately.     Subjective      Review of Systems:   Review of Systems   Constitutional:  Negative for appetite change, chills, diaphoresis, fatigue, fever, unexpected weight gain and unexpected weight loss.   HENT:  Negative for drooling, facial swelling, mouth sores, nosebleeds, rhinorrhea, sore throat, swollen glands, tinnitus and trouble swallowing.    Eyes: Negative.    Respiratory:  Negative for choking, chest tightness and shortness of breath.    Gastrointestinal:  Positive for diarrhea. Negative for abdominal pain, anal bleeding, blood in stool, constipation, nausea, vomiting, GERD and indigestion.   Endocrine: Negative.    Genitourinary:  Negative for dysuria, flank pain and hematuria.   Musculoskeletal:  Negative for arthralgias, back pain, myalgias and neck pain.   Skin:  Negative for color change, dry skin, pallor and bruise.   Neurological:  Negative for dizziness, tremors, syncope, weakness and numbness.   Psychiatric/Behavioral:  Negative for decreased concentration, hallucinations and self-injury. The patient is  not nervous/anxious.    All other systems reviewed and are negative.      Medications:     Current Outpatient Medications:     clobetasol (TEMOVATE) 0.05 % ointment, Apply 1 application  topically to the appropriate area as directed 2 (Two) Times a Day., Disp: 1 each, Rfl: 1    Diclofenac Sodium (VOLTAREN) 1 % gel gel, Apply 4 g topically to the appropriate area as directed 2 (Two) Times a Day., Disp: 100 g, Rfl: 2    multivitamin with minerals tablet tablet, Take 1 tablet by mouth Daily., Disp: , Rfl:     valACYclovir (Valtrex) 500 MG tablet, Take 1 tablet by mouth Daily. (Patient taking differently: Take 1 tablet by mouth As Needed.), Disp: 30 tablet, Rfl: 3    Allergies:   Allergies   Allergen Reactions    Trazodone Anaphylaxis    Lactose Intolerance (Gi) Diarrhea    Wellbutrin [Bupropion] Other (See Comments)     Panic attacks         Social History:   Social History     Socioeconomic History    Marital status:    Tobacco Use    Smoking status: Former     Packs/day: 1.00     Years: 27.00     Additional pack years: 0.00     Total pack years: 27.00     Types: Cigarettes     Quit date: 6/15/2018     Years since quittin.6     Passive exposure: Past    Smokeless tobacco: Never   Vaping Use    Vaping Use: Never used   Substance and Sexual Activity    Alcohol use: No    Drug use: Not Currently     Comment: alcohol abuse for years    Sexual activity: Defer     Partners: Female     Birth control/protection: Abstinence        Surgical History:   Past Surgical History:   Procedure Laterality Date    COLON RESECTION N/A 2023    Procedure: COLON RESECTION LAPAROSCOPIC RIGHT WITH DAVINCI ROBOT;  Surgeon: Rinku Urrutia MD;  Location: [facility];  Service: Robotics - DaVinci;  Laterality: N/A;    COLONOSCOPY      CYST REMOVAL Right         Medical History:   Past Medical History:   Diagnosis Date    Allergic     Anxiety always    Arthritis     Bell's palsy     2018    Depression     Diabetes mellitus      GERD (gastroesophageal reflux disease) 2007    Hyperlipidemia     Pneumonia 1994    Seasonal allergies     Substance abuse 2000    Tremor 2007    Visual impairment 2013        Objective     Physical Exam:  Vital Signs: There were no vitals filed for this visit.  There is no height or weight on file to calculate BMI.     Physical Exam  Vitals and nursing note reviewed.   Constitutional:       General: He is not in acute distress.     Appearance: Normal appearance. He is not ill-appearing or diaphoretic.      Comments: BMI 36.08. Pleasantly conversant.    HENT:      Head: Normocephalic and atraumatic.      Right Ear: External ear normal.      Left Ear: External ear normal.      Nose: Nose normal.      Mouth/Throat:      Mouth: Mucous membranes are moist.      Pharynx: Oropharynx is clear.   Eyes:      Conjunctiva/sclera: Conjunctivae normal.      Pupils: Pupils are equal, round, and reactive to light.   Cardiovascular:      Rate and Rhythm: Normal rate and regular rhythm.      Pulses: Normal pulses.      Heart sounds: Normal heart sounds.   Pulmonary:      Effort: Pulmonary effort is normal.      Breath sounds: Normal breath sounds.   Abdominal:      General: Abdomen is flat. There is no distension.      Tenderness: There is no abdominal tenderness. There is no guarding or rebound.   Musculoskeletal:         General: Normal range of motion.      Cervical back: Normal range of motion.   Skin:     General: Skin is warm and dry.      Coloration: Skin is not jaundiced or pale.   Neurological:      General: No focal deficit present.      Mental Status: He is alert and oriented to person, place, and time. Mental status is at baseline.   Psychiatric:         Mood and Affect: Mood normal.         Assessment / Plan      Assessment/Plan:   There are no diagnoses linked to this encounter.     IBS-D  H/o right hemicolectomy 9/2023  Tubular adenoma of colon   - rx for colestid 1g BID sent to pharmacy; pt advised to call clinic in  1-2 weeks with sx update   - previous labs, imaging, endoscopy, pathology, operative notes reviewed   - follow up in clinic in 6 weeks, or after completion of above studies   - call clinic at any time for questions or new / worsened sx    Follow Up:   Return in about 6 weeks (around 3/11/2024).    Plan of care reviewed with the patient at the conclusion of today's visit.  Education was provided regarding diagnosis, management, and any prescribed or recommended OTC medications.  Patient verbalized understanding of and agreement with management plan.     NOTE TO PATIENT: The 21st Century Cures Act makes medical notes like these available to patients in the interest of transparency. However, be advised this is a medical document. It is intended as peer to peer communication. It is written in medical language and may contain abbreviations or verbiage that are unfamiliar. It may appear blunt or direct. Medical documents are intended to carry relevant information, facts as evident, and the clinical opinion of the practitioner.     Time Statement:   Discussed plan of care in detail with patient today. Patient verbally understands and agrees. I have spent 30 minutes reviewing available diagnostics, obtaining history, examining the patient, developing a treatment plan, and educating the patient on disease process and plan of care.     Leny Arnett PA-C   [facility]

## 2024-05-28 DIAGNOSIS — K58.0 IRRITABLE BOWEL SYNDROME WITH DIARRHEA: ICD-10-CM

## 2024-05-28 NOTE — TELEPHONE ENCOUNTER
Rx Refill Note  Pending Prescriptions:                       Disp   Refills    colestipol (Colestid) 1 g tablet           60 tab*5        Sig: Take 1 tablet by mouth 2 (Two) Times a Day.    Last office visit with prescribing clinician: 1/29/2024   Last telemedicine visit with prescribing clinician: Visit date not found   Next office visit with prescribing clinician: Visit date not found         Jonna Weems MA  05/28/24, 15:06 EDT

## 2024-05-29 RX ORDER — MONTELUKAST SODIUM 4 MG/1
1 TABLET, CHEWABLE ORAL 2 TIMES DAILY
Qty: 60 TABLET | Refills: 5 | Status: SHIPPED | OUTPATIENT
Start: 2024-05-29

## 2024-09-30 ENCOUNTER — LAB (OUTPATIENT)
Dept: LAB | Facility: HOSPITAL | Age: 54
End: 2024-09-30
Payer: COMMERCIAL

## 2024-09-30 ENCOUNTER — OFFICE VISIT (OUTPATIENT)
Dept: FAMILY MEDICINE CLINIC | Facility: CLINIC | Age: 54
End: 2024-09-30
Payer: COMMERCIAL

## 2024-09-30 VITALS
TEMPERATURE: 98.2 F | WEIGHT: 268.8 LBS | HEIGHT: 73 IN | OXYGEN SATURATION: 97 % | HEART RATE: 83 BPM | DIASTOLIC BLOOD PRESSURE: 76 MMHG | BODY MASS INDEX: 35.63 KG/M2 | SYSTOLIC BLOOD PRESSURE: 131 MMHG

## 2024-09-30 DIAGNOSIS — R79.89 ELEVATED SERUM CREATININE: ICD-10-CM

## 2024-09-30 DIAGNOSIS — Z00.00 WELLNESS EXAMINATION: ICD-10-CM

## 2024-09-30 DIAGNOSIS — Z12.5 SCREENING FOR PROSTATE CANCER: ICD-10-CM

## 2024-09-30 DIAGNOSIS — R73.09 ELEVATED HEMOGLOBIN A1C: ICD-10-CM

## 2024-09-30 DIAGNOSIS — Z00.00 ANNUAL PHYSICAL EXAM: Primary | ICD-10-CM

## 2024-09-30 DIAGNOSIS — K58.0 IRRITABLE BOWEL SYNDROME WITH DIARRHEA: ICD-10-CM

## 2024-09-30 DIAGNOSIS — Z12.2 SCREENING FOR LUNG CANCER: ICD-10-CM

## 2024-09-30 PROBLEM — K58.9 IBS (IRRITABLE BOWEL SYNDROME): Status: ACTIVE | Noted: 2024-09-30

## 2024-09-30 LAB
ALBUMIN SERPL-MCNC: 4.7 G/DL (ref 3.5–5.2)
ALBUMIN/GLOB SERPL: 1.6 G/DL
ALP SERPL-CCNC: 91 U/L (ref 39–117)
ALT SERPL W P-5'-P-CCNC: 54 U/L (ref 1–41)
ANION GAP SERPL CALCULATED.3IONS-SCNC: 9.7 MMOL/L (ref 5–15)
AST SERPL-CCNC: 32 U/L (ref 1–40)
BILIRUB SERPL-MCNC: 1 MG/DL (ref 0–1.2)
BUN SERPL-MCNC: 12 MG/DL (ref 6–20)
BUN/CREAT SERPL: 8.8 (ref 7–25)
CALCIUM SPEC-SCNC: 10.1 MG/DL (ref 8.6–10.5)
CHLORIDE SERPL-SCNC: 103 MMOL/L (ref 98–107)
CHOLEST SERPL-MCNC: 254 MG/DL (ref 0–200)
CO2 SERPL-SCNC: 25.3 MMOL/L (ref 22–29)
CREAT SERPL-MCNC: 1.37 MG/DL (ref 0.76–1.27)
DEPRECATED RDW RBC AUTO: 44.9 FL (ref 37–54)
EGFRCR SERPLBLD CKD-EPI 2021: 62.1 ML/MIN/1.73
ERYTHROCYTE [DISTWIDTH] IN BLOOD BY AUTOMATED COUNT: 13.2 % (ref 12.3–15.4)
GLOBULIN UR ELPH-MCNC: 2.9 GM/DL
GLUCOSE SERPL-MCNC: 83 MG/DL (ref 65–99)
HBA1C MFR BLD: 6.4 % (ref 4.8–5.6)
HCT VFR BLD AUTO: 48 % (ref 37.5–51)
HDLC SERPL-MCNC: 36 MG/DL (ref 40–60)
HGB BLD-MCNC: 16 G/DL (ref 13–17.7)
LDLC SERPL CALC-MCNC: 195 MG/DL (ref 0–100)
LDLC/HDLC SERPL: 5.36 {RATIO}
MCH RBC QN AUTO: 30.7 PG (ref 26.6–33)
MCHC RBC AUTO-ENTMCNC: 33.3 G/DL (ref 31.5–35.7)
MCV RBC AUTO: 92.1 FL (ref 79–97)
PLATELET # BLD AUTO: 184 10*3/MM3 (ref 140–450)
PMV BLD AUTO: 12.3 FL (ref 6–12)
POTASSIUM SERPL-SCNC: 3.9 MMOL/L (ref 3.5–5.2)
PROT SERPL-MCNC: 7.6 G/DL (ref 6–8.5)
PSA SERPL-MCNC: 0.7 NG/ML (ref 0–4)
RBC # BLD AUTO: 5.21 10*6/MM3 (ref 4.14–5.8)
SODIUM SERPL-SCNC: 138 MMOL/L (ref 136–145)
TRIGL SERPL-MCNC: 126 MG/DL (ref 0–150)
TSH SERPL DL<=0.05 MIU/L-ACNC: 1.79 UIU/ML (ref 0.27–4.2)
VLDLC SERPL-MCNC: 23 MG/DL (ref 5–40)
WBC NRBC COR # BLD AUTO: 8.16 10*3/MM3 (ref 3.4–10.8)

## 2024-09-30 PROCEDURE — 86803 HEPATITIS C AB TEST: CPT

## 2024-09-30 PROCEDURE — G0103 PSA SCREENING: HCPCS

## 2024-09-30 PROCEDURE — 82043 UR ALBUMIN QUANTITATIVE: CPT

## 2024-09-30 PROCEDURE — 82306 VITAMIN D 25 HYDROXY: CPT

## 2024-09-30 PROCEDURE — 87340 HEPATITIS B SURFACE AG IA: CPT

## 2024-09-30 PROCEDURE — 81001 URINALYSIS AUTO W/SCOPE: CPT

## 2024-09-30 PROCEDURE — 86704 HEP B CORE ANTIBODY TOTAL: CPT

## 2024-09-30 PROCEDURE — 80061 LIPID PANEL: CPT

## 2024-09-30 PROCEDURE — 86696 HERPES SIMPLEX TYPE 2 TEST: CPT

## 2024-09-30 PROCEDURE — G0432 EIA HIV-1/HIV-2 SCREEN: HCPCS

## 2024-09-30 PROCEDURE — 36415 COLL VENOUS BLD VENIPUNCTURE: CPT

## 2024-09-30 PROCEDURE — 83036 HEMOGLOBIN GLYCOSYLATED A1C: CPT

## 2024-09-30 PROCEDURE — 82607 VITAMIN B-12: CPT

## 2024-09-30 PROCEDURE — 99213 OFFICE O/P EST LOW 20 MIN: CPT | Performed by: STUDENT IN AN ORGANIZED HEALTH CARE EDUCATION/TRAINING PROGRAM

## 2024-09-30 PROCEDURE — 86592 SYPHILIS TEST NON-TREP QUAL: CPT

## 2024-09-30 PROCEDURE — 80050 GENERAL HEALTH PANEL: CPT

## 2024-09-30 PROCEDURE — 99396 PREV VISIT EST AGE 40-64: CPT | Performed by: STUDENT IN AN ORGANIZED HEALTH CARE EDUCATION/TRAINING PROGRAM

## 2024-09-30 PROCEDURE — 86706 HEP B SURFACE ANTIBODY: CPT

## 2024-09-30 PROCEDURE — 82570 ASSAY OF URINE CREATININE: CPT

## 2024-09-30 RX ORDER — MONTELUKAST SODIUM 4 MG/1
1 TABLET, CHEWABLE ORAL 3 TIMES DAILY PRN
Qty: 90 TABLET | Refills: 2 | Status: SHIPPED | OUTPATIENT
Start: 2024-09-30

## 2024-09-30 NOTE — PROGRESS NOTES
"Chief Complaint  Annual Exam (Patient wants to get A1c and microalbumin with lab/blood work. )    History of Present Illness      Annual exam  Colonoscopy is due possibly, he will check with dr sotelo   Lung cancer screen ordered  Counseled on diet and exercise including limited sweets and sugars to focus on lean meat and vegetables. Counseled on 50 minutes of moderate exercise 3 times per week.   Recommend dental and eye exam  Psa ordered.   hiv hep c sti screening: he is not interested in repeat hsv 2   a1c /lipid screening: he is agreeable.   Vaccines recommended:  covid vaccine  flu  Shingles  Tdap  Hepatitis      Patient reports more bms lately, no fever, no belly pain no vomiting.      He had some right elbow pain that was on the right lateral elbow at the tendon that hs resolved.     The following portions of the patient's history were reviewed and updated as appropriate: allergies, current medications, past family history, past medical history, past social history, past surgical history, and problem list.    OBJECTIVE:  /76   Pulse 83   Temp 98.2 °F (36.8 °C) (Infrared)   Ht 185.4 cm (72.99\")   Wt 122 kg (268 lb 12.8 oz)   SpO2 97%   BMI 35.47 kg/m²       Physical Exam  Constitutional:       General: He is not in acute distress.     Appearance: Normal appearance.   HENT:      Head: Normocephalic and atraumatic.   Eyes:      Extraocular Movements: Extraocular movements intact.   Cardiovascular:      Rate and Rhythm: Normal rate and regular rhythm.      Heart sounds: No murmur heard.  Pulmonary:      Effort: Pulmonary effort is normal. No respiratory distress.      Breath sounds: Normal breath sounds. No stridor. No wheezing, rhonchi or rales.   Abdominal:      Palpations: Abdomen is soft.      Tenderness: There is no abdominal tenderness.   Skin:     Findings: No rash.   Neurological:      General: No focal deficit present.      Mental Status: He is alert.   Psychiatric:         Mood and Affect: " Mood normal.                  Assessment and Plan   Diagnoses and all orders for this visit:    1. Annual physical exam (Primary)    2. Screening for lung cancer  -     CT Chest Low Dose Wo; Future    3. Screening for prostate cancer  -     PSA Screen; Future    4. Wellness examination  -     CBC (No Diff); Future  -     Comprehensive Metabolic Panel; Future  -     Hemoglobin A1c; Future  -     Lipid Panel; Future  -     TSH Rfx On Abnormal To Free T4; Future  -     Vitamin B12; Future  -     Vitamin D,25-Hydroxy; Future  -     HSV 2 Antibody, IgG; Future  -     RPR Qualitative with Reflex to Quant; Future  -     HIV-1 / O / 2 Ag / Antibody; Future  -     Hepatitis B Virus (HBV) Screening and Diagnosis; Future  -     Hepatitis C Antibody; Future    5. Elevated hemoglobin A1c  -     Microalbumin / Creatinine Urine Ratio - Urine, Clean Catch; Future    6. Irritable bowel syndrome with diarrhea  -     colestipol (Colestid) 1 g tablet; Take 1 tablet by mouth 3 (Three) Times a Day As Needed (diarrhea).  Dispense: 90 tablet; Refill: 2        Annual exam  Colonoscopy is due possibly, he will check with dr sotelo   Lung cancer screen ordered  Counseled on diet and exercise including limited sweets and sugars to focus on lean meat and vegetables. Counseled on 50 minutes of moderate exercise 3 times per week.   Recommend dental and eye exam  Psa ordered.   hiv hep c sti screening: he is not interested in repeat hsv 2   a1c /lipid screening: he is agreeable.   Vaccines recommended:  covid vaccine  flu  Shingles  Tdap  Hepatitis      Patient reports more bms lately, no fever, no belly pain no vomiting.  Colestipol controls this but he may need a higher dose. He will increase to 1 mg tid prn and advised him to let his gastro specialist know as well. No red flags. Seek care if diarrhea continues.       He had some right elbow pain that resolved. Sounded like tendonitis. Continue to monitor.       Return in about 6 months (around  3/30/2025).       Alice Mantilla D.O.  [facility] [place]

## 2024-10-01 DIAGNOSIS — R79.89 ELEVATED SERUM CREATININE: Primary | ICD-10-CM

## 2024-10-01 LAB
25(OH)D3 SERPL-MCNC: 30.4 NG/ML (ref 30–100)
ALBUMIN UR-MCNC: <1.2 MG/DL
BACTERIA UR QL AUTO: NORMAL /HPF
BILIRUB UR QL STRIP: NEGATIVE
CLARITY UR: CLEAR
COLOR UR: YELLOW
CREAT UR-MCNC: 218.4 MG/DL
GLUCOSE UR STRIP-MCNC: NEGATIVE MG/DL
HCV AB SER QL: NORMAL
HGB UR QL STRIP.AUTO: NEGATIVE
HIV 1+2 AB+HIV1 P24 AG SERPL QL IA: NORMAL
HYALINE CASTS UR QL AUTO: NORMAL /LPF
KETONES UR QL STRIP: NEGATIVE
LEUKOCYTE ESTERASE UR QL STRIP.AUTO: NEGATIVE
MICROALBUMIN/CREAT UR: NORMAL MG/G{CREAT}
NITRITE UR QL STRIP: NEGATIVE
PH UR STRIP.AUTO: 6 [PH] (ref 5–8)
PROT UR QL STRIP: NEGATIVE
RBC # UR STRIP: NORMAL /HPF
REF LAB TEST METHOD: NORMAL
RPR SER QL: NORMAL
SP GR UR STRIP: 1.02 (ref 1–1.03)
SQUAMOUS #/AREA URNS HPF: NORMAL /HPF
UROBILINOGEN UR QL STRIP: NORMAL
VIT B12 BLD-MCNC: 515 PG/ML (ref 211–946)
WBC # UR STRIP: NORMAL /HPF

## 2024-10-01 NOTE — PROGRESS NOTES
"Please call the patient to let him know his kidney function has decreased since his last check. He is barely above normal. For good kidney health increase hydration and avoid nsaids (aleve advil ibuprofen naproxen). Keep blood pressure at 120/80 and let me know if not in this range. Controlling diabetes helps prevent kidney disease.   Recheck in 3-6 months.   I ordered an ultrasound of the kidneys to make sure they look okay and have no sign of disease. He will be called to schedule. This will also show us his liver as one liver test remains slightly elevated. Commonly elevated liver tests are due to \"fatty liver\" caused by obesity over time. Continue with diet efforts to improve this and avoid alcohol.       "

## 2024-10-02 ENCOUNTER — TELEPHONE (OUTPATIENT)
Dept: FAMILY MEDICINE CLINIC | Facility: CLINIC | Age: 54
End: 2024-10-02
Payer: COMMERCIAL

## 2024-10-02 LAB
HBV CORE AB SERPL QL IA: NEGATIVE
HBV SURFACE AB SER QL: NON REACTIVE
HBV SURFACE AG SERPL QL IA: NEGATIVE
HSV2 IGG SER IA-ACNC: 19.5 INDEX (ref 0–0.9)
IMP & REVIEW OF LAB RESULTS: NORMAL
LABORATORY COMMENT REPORT: NORMAL

## 2024-10-02 NOTE — TELEPHONE ENCOUNTER
"  Please call the patient to let him know his kidney function has decreased since his last check. He is barely above normal. For good kidney health increase hydration and avoid nsaids (aleve advil ibuprofen naproxen). Keep blood pressure at 120/80 and let me know if not in this range. Controlling diabetes helps prevent kidney disease.   Recheck in 3-6 months.   I ordered an ultrasound of the kidneys to make sure they look okay and have no sign of disease. He will be called to schedule. This will also show us his liver as one liver test remains slightly elevated. Commonly elevated liver tests are due to \"fatty liver\" caused by obesity over time. Continue with diet efforts to improve this and avoid alcohol.    Called patient and he voiced understanding about the above message.         "

## 2024-10-03 ENCOUNTER — TELEPHONE (OUTPATIENT)
Dept: FAMILY MEDICINE CLINIC | Facility: CLINIC | Age: 54
End: 2024-10-03
Payer: COMMERCIAL

## 2024-10-03 NOTE — TELEPHONE ENCOUNTER
Antibody to hsv positive.   He is not immune to hep b, recommend vaccination.     Called patient with the above message and he voiced understanding.

## 2024-10-12 ENCOUNTER — HOSPITAL ENCOUNTER (OUTPATIENT)
Dept: CT IMAGING | Facility: HOSPITAL | Age: 54
Discharge: HOME | End: 2024-10-12
Admitting: STUDENT IN AN ORGANIZED HEALTH CARE EDUCATION/TRAINING PROGRAM
Payer: COMMERCIAL

## 2024-10-12 DIAGNOSIS — Z12.2 SCREENING FOR LUNG CANCER: ICD-10-CM

## 2024-10-12 PROCEDURE — 71271 CT THORAX LUNG CANCER SCR C-: CPT

## 2024-10-19 ENCOUNTER — HOSPITAL ENCOUNTER (OUTPATIENT)
Dept: ULTRASOUND IMAGING | Facility: HOSPITAL | Age: 54
Discharge: HOME | End: 2024-10-19
Admitting: STUDENT IN AN ORGANIZED HEALTH CARE EDUCATION/TRAINING PROGRAM
Payer: COMMERCIAL

## 2024-10-19 DIAGNOSIS — R79.89 ELEVATED SERUM CREATININE: ICD-10-CM

## 2024-10-19 PROCEDURE — 76700 US EXAM ABDOM COMPLETE: CPT

## 2024-12-17 ENCOUNTER — PATIENT ROUNDING (BHMG ONLY) (OUTPATIENT)
Dept: URGENT CARE | Facility: CLINIC | Age: 54
End: 2024-12-17
Payer: COMMERCIAL

## 2024-12-30 ENCOUNTER — OFFICE VISIT (OUTPATIENT)
Dept: FAMILY MEDICINE CLINIC | Facility: CLINIC | Age: 54
End: 2024-12-30
Payer: COMMERCIAL

## 2024-12-30 ENCOUNTER — LAB (OUTPATIENT)
Dept: LAB | Facility: HOSPITAL | Age: 54
End: 2024-12-30
Payer: COMMERCIAL

## 2024-12-30 VITALS
DIASTOLIC BLOOD PRESSURE: 90 MMHG | OXYGEN SATURATION: 98 % | SYSTOLIC BLOOD PRESSURE: 120 MMHG | HEART RATE: 66 BPM | TEMPERATURE: 98.4 F | BODY MASS INDEX: 35.1 KG/M2 | WEIGHT: 258.8 LBS | RESPIRATION RATE: 19 BRPM

## 2024-12-30 DIAGNOSIS — R79.89 ELEVATED SERUM CREATININE: ICD-10-CM

## 2024-12-30 DIAGNOSIS — E78.49 OTHER HYPERLIPIDEMIA: ICD-10-CM

## 2024-12-30 DIAGNOSIS — R79.89 ELEVATED LFTS: ICD-10-CM

## 2024-12-30 DIAGNOSIS — R73.09 ELEVATED HEMOGLOBIN A1C: Primary | ICD-10-CM

## 2024-12-30 LAB
ALBUMIN SERPL-MCNC: 4.2 G/DL (ref 3.5–5.2)
ALBUMIN/GLOB SERPL: 1.2 G/DL
ALP SERPL-CCNC: 85 U/L (ref 39–117)
ALT SERPL W P-5'-P-CCNC: 36 U/L (ref 1–41)
ANION GAP SERPL CALCULATED.3IONS-SCNC: 8.1 MMOL/L (ref 5–15)
AST SERPL-CCNC: 26 U/L (ref 1–40)
BACTERIA UR QL AUTO: NORMAL /HPF
BILIRUB SERPL-MCNC: 0.5 MG/DL (ref 0–1.2)
BILIRUB UR QL STRIP: NEGATIVE
BUN SERPL-MCNC: 22 MG/DL (ref 6–20)
BUN/CREAT SERPL: 14.9 (ref 7–25)
CALCIUM SPEC-SCNC: 9.6 MG/DL (ref 8.6–10.5)
CHLORIDE SERPL-SCNC: 104 MMOL/L (ref 98–107)
CHOLEST SERPL-MCNC: 233 MG/DL (ref 0–200)
CLARITY UR: CLEAR
CO2 SERPL-SCNC: 24.9 MMOL/L (ref 22–29)
COLOR UR: YELLOW
CREAT SERPL-MCNC: 1.48 MG/DL (ref 0.76–1.27)
EGFRCR SERPLBLD CKD-EPI 2021: 56.6 ML/MIN/1.73
EXPIRATION DATE: ABNORMAL
GLOBULIN UR ELPH-MCNC: 3.6 GM/DL
GLUCOSE SERPL-MCNC: 88 MG/DL (ref 65–99)
GLUCOSE UR STRIP-MCNC: NEGATIVE MG/DL
HBA1C MFR BLD: 6 % (ref 4.5–5.7)
HDLC SERPL-MCNC: 36 MG/DL (ref 40–60)
HGB UR QL STRIP.AUTO: NEGATIVE
HYALINE CASTS UR QL AUTO: NORMAL /LPF
KETONES UR QL STRIP: NEGATIVE
LDLC SERPL CALC-MCNC: 174 MG/DL (ref 0–100)
LDLC/HDLC SERPL: 4.78 {RATIO}
LEUKOCYTE ESTERASE UR QL STRIP.AUTO: NEGATIVE
Lab: ABNORMAL
NITRITE UR QL STRIP: NEGATIVE
PH UR STRIP.AUTO: 5.5 [PH] (ref 5–8)
POTASSIUM SERPL-SCNC: 4.5 MMOL/L (ref 3.5–5.2)
PROT SERPL-MCNC: 7.8 G/DL (ref 6–8.5)
PROT UR QL STRIP: NEGATIVE
RBC # UR STRIP: NORMAL /HPF
REF LAB TEST METHOD: NORMAL
SODIUM SERPL-SCNC: 137 MMOL/L (ref 136–145)
SP GR UR STRIP: 1.02 (ref 1–1.03)
SQUAMOUS #/AREA URNS HPF: NORMAL /HPF
TRIGL SERPL-MCNC: 124 MG/DL (ref 0–150)
UROBILINOGEN UR QL STRIP: NORMAL
VLDLC SERPL-MCNC: 23 MG/DL (ref 5–40)
WBC # UR STRIP: NORMAL /HPF

## 2024-12-30 PROCEDURE — 36415 COLL VENOUS BLD VENIPUNCTURE: CPT

## 2024-12-30 PROCEDURE — 80061 LIPID PANEL: CPT

## 2024-12-30 PROCEDURE — 83036 HEMOGLOBIN GLYCOSYLATED A1C: CPT | Performed by: STUDENT IN AN ORGANIZED HEALTH CARE EDUCATION/TRAINING PROGRAM

## 2024-12-30 PROCEDURE — 99214 OFFICE O/P EST MOD 30 MIN: CPT | Performed by: STUDENT IN AN ORGANIZED HEALTH CARE EDUCATION/TRAINING PROGRAM

## 2024-12-30 PROCEDURE — 80053 COMPREHEN METABOLIC PANEL: CPT

## 2024-12-30 PROCEDURE — 81001 URINALYSIS AUTO W/SCOPE: CPT

## 2024-12-30 RX ORDER — BLOOD-GLUCOSE METER
1 KIT MISCELLANEOUS ONCE
Qty: 1 EACH | Refills: 0 | Status: SHIPPED | OUTPATIENT
Start: 2024-12-30 | End: 2024-12-30

## 2024-12-30 NOTE — PROGRESS NOTES
Chief Complaint  Hypoglycemia (F/u)    Hypoglycemia            dm  He went to urgent care and was given glucometer.   He has lost weight making dietary changes.         Hld  Not on statin at this time.       He stopped the colestipol as he felt nausea on it.     The following portions of the patient's history were reviewed and updated as appropriate: allergies, current medications, past family history, past medical history, past social history, past surgical history, and problem list.    OBJECTIVE:  /90 (BP Location: Left arm, Patient Position: Sitting, Cuff Size: Adult)   Pulse 66   Temp 98.4 °F (36.9 °C) (Infrared)   Resp 19   Wt 117 kg (258 lb 12.8 oz)   SpO2 98%   BMI 35.10 kg/m²       Physical Exam  Constitutional:       General: He is not in acute distress.     Appearance: Normal appearance.   HENT:      Head: Normocephalic and atraumatic.   Eyes:      Extraocular Movements: Extraocular movements intact.   Cardiovascular:      Rate and Rhythm: Normal rate and regular rhythm.      Heart sounds: No murmur heard.  Pulmonary:      Effort: Pulmonary effort is normal. No respiratory distress.      Breath sounds: Normal breath sounds. No stridor. No wheezing, rhonchi or rales.   Skin:     Findings: No rash.   Neurological:      General: No focal deficit present.      Mental Status: He is alert.   Psychiatric:         Mood and Affect: Mood normal.                    Assessment and Plan   Diagnoses and all orders for this visit:    1. Elevated hemoglobin A1c (Primary)  -     POC Glycosylated Hemoglobin (Hb A1C)    2. Other hyperlipidemia  -     Lipid panel; Future  -     CT Cardiac Calcium Score Without Dye; Future    3. Elevated LFTs  -     Comprehensive metabolic panel; Future    4. Elevated serum creatinine  -     Urinalysis With Microscopic - Urine, Clean Catch; Future        We discussed statin. He would like dietary changes . Will recheck.   We discussed cardiac ct scan. He is agreeable.       Return  in about 2 months (around 2/28/2025).       Alice Mantilla D.O.  [facility] [place]

## 2024-12-31 ENCOUNTER — TELEPHONE (OUTPATIENT)
Dept: FAMILY MEDICINE CLINIC | Facility: CLINIC | Age: 54
End: 2024-12-31
Payer: COMMERCIAL

## 2024-12-31 NOTE — PROGRESS NOTES
Please let the patient know that cholesterol is elevated which is a risk factor for heart attack stroke or vascular complications over time. Recommend diet with lean meat fish and vegetables and decreased sugar/carbs with daily exercise. Can refer to phone dietitian if this helps let me know. We can also start a medication called lipitor or repatha which would help lower this risk. Let me know. Cardiac scan ordered as well to help guide decision.       Kidney function is decreased. Normal gfr (kidney function ) is above 60. Increase hydration and avoid nsaids like aleve advil ibuprofen naproxen as these cause decreased kidney function. Recheck this level in 3 months or sooner for symptoms. If the level doesn't improve we will refer to nephro.

## 2024-12-31 NOTE — TELEPHONE ENCOUNTER
Hub to Relay    Called patient and left a message for patient to return our call. Also sent message through his my chart account.    Please let the patient know that cholesterol is elevated which is a risk factor for heart attack stroke or vascular complications over time. Recommend diet with lean meat fish and vegetables and decreased sugar/carbs with daily exercise. Can refer to phone dietitian if this helps let me know. We can also start a medication called lipitor or repatha which would help lower this risk. Let me know. Cardiac scan ordered as well to help guide decision.       Kidney function is decreased. Normal gfr (kidney function ) is above 60. Increase hydration and avoid nsaids like aleve advil ibuprofen naproxen as these cause decreased kidney function. Recheck this level in 3 months or sooner for symptoms. If the level doesn't improve we will refer to nephro.

## 2025-01-02 DIAGNOSIS — N20.0 NEPHROLITH: Primary | ICD-10-CM

## 2025-01-02 DIAGNOSIS — E78.5 HYPERLIPIDEMIA, UNSPECIFIED HYPERLIPIDEMIA TYPE: ICD-10-CM

## 2025-01-13 ENCOUNTER — LAB (OUTPATIENT)
Dept: LAB | Facility: HOSPITAL | Age: 55
End: 2025-01-13
Payer: COMMERCIAL

## 2025-01-13 ENCOUNTER — OFFICE VISIT (OUTPATIENT)
Dept: FAMILY MEDICINE CLINIC | Facility: CLINIC | Age: 55
End: 2025-01-13
Payer: COMMERCIAL

## 2025-01-13 ENCOUNTER — APPOINTMENT (OUTPATIENT)
Dept: GENERAL RADIOLOGY | Facility: HOSPITAL | Age: 55
End: 2025-01-13
Payer: COMMERCIAL

## 2025-01-13 VITALS
RESPIRATION RATE: 21 BRPM | BODY MASS INDEX: 34.54 KG/M2 | SYSTOLIC BLOOD PRESSURE: 136 MMHG | OXYGEN SATURATION: 99 % | TEMPERATURE: 97.3 F | HEART RATE: 64 BPM | HEIGHT: 72 IN | WEIGHT: 255 LBS | DIASTOLIC BLOOD PRESSURE: 94 MMHG

## 2025-01-13 DIAGNOSIS — R79.89 LOW VITAMIN D LEVEL: ICD-10-CM

## 2025-01-13 DIAGNOSIS — R07.9 CHEST PAIN, UNSPECIFIED TYPE: ICD-10-CM

## 2025-01-13 DIAGNOSIS — R79.89 ELEVATED SERUM CREATININE: ICD-10-CM

## 2025-01-13 DIAGNOSIS — R79.89 ELEVATED SERUM CREATININE: Primary | ICD-10-CM

## 2025-01-13 DIAGNOSIS — R07.81 RIB PAIN: ICD-10-CM

## 2025-01-13 PROCEDURE — 83690 ASSAY OF LIPASE: CPT

## 2025-01-13 PROCEDURE — 36415 COLL VENOUS BLD VENIPUNCTURE: CPT

## 2025-01-13 PROCEDURE — 82306 VITAMIN D 25 HYDROXY: CPT

## 2025-01-13 PROCEDURE — 80053 COMPREHEN METABOLIC PANEL: CPT

## 2025-01-13 NOTE — PROGRESS NOTES
"Chief Complaint  Abdominal Pain (Pt has been having abdominal cramping for going on a couple weeks and pain is worsening. Pt states under ribs is tender to touch)    Abdominal Pain      The patient has been having constant pain in his sides of the lower ribs. Bms are \"normal\" for him and with loose at times which is how it has been. No blood no fevers. He's eating normally. No vomiting. The pain is constant in bilateral lower ribs and is pain scale 6/10 not touching it. No palps dizziness or sob. The pain feels like a band coming from his back.       His bp typically is normal but spiked this weekend to 156/94.       He doesn't take nsaids otc. No signs of uti like burning hesitancy blood in the urine. No inability to void to suggest prostate issues. He had worked out the day of the bmp and wants recheck.     The following portions of the patient's history were reviewed and updated as appropriate: allergies, current medications, past family history, past medical history, past social history, past surgical history, and problem list.    OBJECTIVE:  /94   Pulse 64   Temp 97.3 °F (36.3 °C) (Temporal)   Resp 21   Ht 182.9 cm (72.01\")   Wt 116 kg (255 lb)   SpO2 99%   BMI 34.58 kg/m²       Physical Exam  Constitutional:       General: He is not in acute distress.     Appearance: Normal appearance.   HENT:      Head: Normocephalic and atraumatic.   Eyes:      Extraocular Movements: Extraocular movements intact.   Cardiovascular:      Rate and Rhythm: Normal rate and regular rhythm.      Heart sounds: No murmur heard.  Pulmonary:      Effort: Pulmonary effort is normal. No respiratory distress.      Breath sounds: Normal breath sounds. No stridor. No wheezing, rhonchi or rales.   Abdominal:      General: Bowel sounds are normal.      Palpations: Abdomen is soft.      Tenderness: There is no abdominal tenderness. There is no guarding or rebound.      Comments: He has absolutely no pain on palpation of the belly " "and all his pain is concentrated in the ribs.    Musculoskeletal:      Comments: Patient has pain to light palpation of ribs ten bilaterally in the front but none in the back. No pain on palpation of thoracic/lumbar spine.    Skin:     Findings: No rash.   Neurological:      General: No focal deficit present.      Mental Status: He is alert.   Psychiatric:         Mood and Affect: Mood normal.            ECG 12 Lead    Date/Time: 1/13/2025 3:43 PM  Performed by: Alice Mantilla DO    Authorized by: Alice Mantilla DO  Rhythm: sinus rhythm  Rate: normal  Conduction: conduction normal  ST Segments: ST segments normal  T Waves: T waves normal  QRS axis: normal    Clinical impression: normal ECG           Assessment and Plan   Diagnoses and all orders for this visit:    1. Elevated serum creatinine (Primary)  -     Comprehensive Metabolic Panel; Future    2. Low vitamin D level  -     Vitamin D,25-Hydroxy; Future    3. Chest pain, unspecified type  -     XR Chest PA & Lateral (In Office)      Upon exam the patient has pain upon light palpation of his rib ten bilaterally suggesting msk component. No features of pleurisy and EKG shows no acute changes. Check xrays back and ribs as I am concerned it is a pain coming from his back \"band\" like pain he describes . He declines any pain control at this time.   No suspicious urinary symptoms today.     Patient will seek care if symptoms worsen or any new symptoms arise.     Recheck creatinine.     Return in about 1 week (around 1/20/2025).       Alice Mantilla D.O.  [facility] [place]   "

## 2025-01-14 ENCOUNTER — TELEPHONE (OUTPATIENT)
Dept: FAMILY MEDICINE CLINIC | Facility: CLINIC | Age: 55
End: 2025-01-14
Payer: COMMERCIAL

## 2025-01-14 LAB
25(OH)D3 SERPL-MCNC: 21.6 NG/ML (ref 30–100)
ALBUMIN SERPL-MCNC: 4.2 G/DL (ref 3.5–5.2)
ALBUMIN/GLOB SERPL: 1.4 G/DL
ALP SERPL-CCNC: 85 U/L (ref 39–117)
ALT SERPL W P-5'-P-CCNC: 35 U/L (ref 1–41)
ANION GAP SERPL CALCULATED.3IONS-SCNC: 12 MMOL/L (ref 5–15)
AST SERPL-CCNC: 25 U/L (ref 1–40)
BILIRUB SERPL-MCNC: 0.7 MG/DL (ref 0–1.2)
BUN SERPL-MCNC: 10 MG/DL (ref 6–20)
BUN/CREAT SERPL: 7.9 (ref 7–25)
CALCIUM SPEC-SCNC: 9.4 MG/DL (ref 8.6–10.5)
CHLORIDE SERPL-SCNC: 106 MMOL/L (ref 98–107)
CO2 SERPL-SCNC: 24 MMOL/L (ref 22–29)
CREAT SERPL-MCNC: 1.26 MG/DL (ref 0.76–1.27)
EGFRCR SERPLBLD CKD-EPI 2021: 68.6 ML/MIN/1.73
GLOBULIN UR ELPH-MCNC: 2.9 GM/DL
GLUCOSE SERPL-MCNC: 74 MG/DL (ref 65–99)
LIPASE SERPL-CCNC: 24 U/L (ref 13–60)
POTASSIUM SERPL-SCNC: 3.7 MMOL/L (ref 3.5–5.2)
PROT SERPL-MCNC: 7.1 G/DL (ref 6–8.5)
SODIUM SERPL-SCNC: 142 MMOL/L (ref 136–145)

## 2025-01-14 RX ORDER — ERGOCALCIFEROL 1.25 MG/1
50000 CAPSULE, LIQUID FILLED ORAL WEEKLY
Qty: 8 CAPSULE | Refills: 0 | Status: SHIPPED | OUTPATIENT
Start: 2025-01-14

## 2025-01-14 NOTE — TELEPHONE ENCOUNTER
Hub to Relay    Called patient and left a message for the patient to return our call. Also sent a message through the patients my chart account.    Please let the patient know the labs are stable. Kidney function looks better in normal range. Vitamin d is low be sure to take 1000 units daily vitamin d over the counter.

## 2025-05-02 ENCOUNTER — APPOINTMENT (OUTPATIENT)
Facility: HOSPITAL | Age: 55
End: 2025-05-02
Payer: COMMERCIAL

## 2025-05-02 ENCOUNTER — HOSPITAL ENCOUNTER (EMERGENCY)
Facility: HOSPITAL | Age: 55
Discharge: HOME | End: 2025-05-02
Attending: STUDENT IN AN ORGANIZED HEALTH CARE EDUCATION/TRAINING PROGRAM
Payer: COMMERCIAL

## 2025-05-02 VITALS
TEMPERATURE: 97.9 F | RESPIRATION RATE: 18 BRPM | BODY MASS INDEX: 34.46 KG/M2 | HEART RATE: 65 BPM | SYSTOLIC BLOOD PRESSURE: 132 MMHG | HEIGHT: 73 IN | WEIGHT: 260 LBS | DIASTOLIC BLOOD PRESSURE: 92 MMHG | OXYGEN SATURATION: 99 %

## 2025-05-02 DIAGNOSIS — R42 VERTIGO: Primary | ICD-10-CM

## 2025-05-02 LAB
ALBUMIN SERPL-MCNC: 4.1 G/DL (ref 3.5–5.2)
ALBUMIN/GLOB SERPL: 1.5 G/DL
ALP SERPL-CCNC: 80 U/L (ref 39–117)
ALT SERPL W P-5'-P-CCNC: 34 U/L (ref 1–41)
ANION GAP SERPL CALCULATED.3IONS-SCNC: 12 MMOL/L (ref 5–15)
AST SERPL-CCNC: 32 U/L (ref 1–40)
BASOPHILS # BLD AUTO: 0.03 10*3/MM3 (ref 0–0.2)
BASOPHILS NFR BLD AUTO: 0.4 % (ref 0–1.5)
BILIRUB SERPL-MCNC: 1.2 MG/DL (ref 0–1.2)
BILIRUB UR QL STRIP: NEGATIVE
BUN SERPL-MCNC: 12 MG/DL (ref 6–20)
BUN/CREAT SERPL: 9.9 (ref 7–25)
CALCIUM SPEC-SCNC: 9.5 MG/DL (ref 8.6–10.5)
CHLORIDE SERPL-SCNC: 106 MMOL/L (ref 98–107)
CLARITY UR: CLEAR
CO2 SERPL-SCNC: 22 MMOL/L (ref 22–29)
COLOR UR: YELLOW
CREAT SERPL-MCNC: 1.21 MG/DL (ref 0.76–1.27)
DEPRECATED RDW RBC AUTO: 45.3 FL (ref 37–54)
EGFRCR SERPLBLD CKD-EPI 2021: 71.6 ML/MIN/1.73
EOSINOPHIL # BLD AUTO: 0.07 10*3/MM3 (ref 0–0.4)
EOSINOPHIL NFR BLD AUTO: 0.9 % (ref 0.3–6.2)
ERYTHROCYTE [DISTWIDTH] IN BLOOD BY AUTOMATED COUNT: 13.4 % (ref 12.3–15.4)
GEN 5 1HR TROPONIN T REFLEX: <6 NG/L
GLOBULIN UR ELPH-MCNC: 2.8 GM/DL
GLUCOSE SERPL-MCNC: 110 MG/DL (ref 65–99)
GLUCOSE UR STRIP-MCNC: NEGATIVE MG/DL
HCT VFR BLD AUTO: 46.3 % (ref 37.5–51)
HGB BLD-MCNC: 15.4 G/DL (ref 13–17.7)
HGB UR QL STRIP.AUTO: NEGATIVE
HOLD SPECIMEN: NORMAL
IMM GRANULOCYTES # BLD AUTO: 0.01 10*3/MM3 (ref 0–0.05)
IMM GRANULOCYTES NFR BLD AUTO: 0.1 % (ref 0–0.5)
KETONES UR QL STRIP: ABNORMAL
LEUKOCYTE ESTERASE UR QL STRIP.AUTO: NEGATIVE
LYMPHOCYTES # BLD AUTO: 2.95 10*3/MM3 (ref 0.7–3.1)
LYMPHOCYTES NFR BLD AUTO: 36.3 % (ref 19.6–45.3)
MAGNESIUM SERPL-MCNC: 2.2 MG/DL (ref 1.6–2.6)
MCH RBC QN AUTO: 30.2 PG (ref 26.6–33)
MCHC RBC AUTO-ENTMCNC: 33.3 G/DL (ref 31.5–35.7)
MCV RBC AUTO: 90.8 FL (ref 79–97)
MONOCYTES # BLD AUTO: 0.6 10*3/MM3 (ref 0.1–0.9)
MONOCYTES NFR BLD AUTO: 7.4 % (ref 5–12)
NEUTROPHILS NFR BLD AUTO: 4.47 10*3/MM3 (ref 1.7–7)
NEUTROPHILS NFR BLD AUTO: 54.9 % (ref 42.7–76)
NITRITE UR QL STRIP: NEGATIVE
PH UR STRIP.AUTO: 7 [PH] (ref 5–8)
PLATELET # BLD AUTO: 175 10*3/MM3 (ref 140–450)
PMV BLD AUTO: 12.5 FL (ref 6–12)
POTASSIUM SERPL-SCNC: 3.8 MMOL/L (ref 3.5–5.2)
PROT SERPL-MCNC: 6.9 G/DL (ref 6–8.5)
PROT UR QL STRIP: NEGATIVE
QT INTERVAL: 386 MS
QT INTERVAL: 400 MS
QTC INTERVAL: 402 MS
QTC INTERVAL: 404 MS
RBC # BLD AUTO: 5.1 10*6/MM3 (ref 4.14–5.8)
SODIUM SERPL-SCNC: 140 MMOL/L (ref 136–145)
SP GR UR STRIP: 1.01 (ref 1–1.03)
TROPONIN T NUMERIC DELTA: NORMAL
TROPONIN T SERPL HS-MCNC: 7 NG/L
UROBILINOGEN UR QL STRIP: ABNORMAL
WBC NRBC COR # BLD AUTO: 8.13 10*3/MM3 (ref 3.4–10.8)
WHOLE BLOOD HOLD COAG: NORMAL
WHOLE BLOOD HOLD SPECIMEN: NORMAL

## 2025-05-02 PROCEDURE — 70496 CT ANGIOGRAPHY HEAD: CPT

## 2025-05-02 PROCEDURE — 25810000003 SODIUM CHLORIDE 0.9 % SOLUTION: Performed by: PHYSICIAN ASSISTANT

## 2025-05-02 PROCEDURE — 93005 ELECTROCARDIOGRAM TRACING: CPT | Performed by: STUDENT IN AN ORGANIZED HEALTH CARE EDUCATION/TRAINING PROGRAM

## 2025-05-02 PROCEDURE — 96374 THER/PROPH/DIAG INJ IV PUSH: CPT

## 2025-05-02 PROCEDURE — 83735 ASSAY OF MAGNESIUM: CPT | Performed by: STUDENT IN AN ORGANIZED HEALTH CARE EDUCATION/TRAINING PROGRAM

## 2025-05-02 PROCEDURE — 85025 COMPLETE CBC W/AUTO DIFF WBC: CPT | Performed by: STUDENT IN AN ORGANIZED HEALTH CARE EDUCATION/TRAINING PROGRAM

## 2025-05-02 PROCEDURE — 70450 CT HEAD/BRAIN W/O DYE: CPT

## 2025-05-02 PROCEDURE — 96361 HYDRATE IV INFUSION ADD-ON: CPT

## 2025-05-02 PROCEDURE — 81003 URINALYSIS AUTO W/O SCOPE: CPT | Performed by: STUDENT IN AN ORGANIZED HEALTH CARE EDUCATION/TRAINING PROGRAM

## 2025-05-02 PROCEDURE — 25010000002 ONDANSETRON PER 1 MG: Performed by: PHYSICIAN ASSISTANT

## 2025-05-02 PROCEDURE — 71045 X-RAY EXAM CHEST 1 VIEW: CPT

## 2025-05-02 PROCEDURE — 84484 ASSAY OF TROPONIN QUANT: CPT | Performed by: STUDENT IN AN ORGANIZED HEALTH CARE EDUCATION/TRAINING PROGRAM

## 2025-05-02 PROCEDURE — 25510000001 IOPAMIDOL PER 1 ML: Performed by: STUDENT IN AN ORGANIZED HEALTH CARE EDUCATION/TRAINING PROGRAM

## 2025-05-02 PROCEDURE — 36415 COLL VENOUS BLD VENIPUNCTURE: CPT

## 2025-05-02 PROCEDURE — 80053 COMPREHEN METABOLIC PANEL: CPT | Performed by: STUDENT IN AN ORGANIZED HEALTH CARE EDUCATION/TRAINING PROGRAM

## 2025-05-02 PROCEDURE — 99285 EMERGENCY DEPT VISIT HI MDM: CPT | Performed by: STUDENT IN AN ORGANIZED HEALTH CARE EDUCATION/TRAINING PROGRAM

## 2025-05-02 PROCEDURE — 70498 CT ANGIOGRAPHY NECK: CPT

## 2025-05-02 RX ORDER — ONDANSETRON 2 MG/ML
4 INJECTION INTRAMUSCULAR; INTRAVENOUS ONCE
Status: COMPLETED | OUTPATIENT
Start: 2025-05-02 | End: 2025-05-02

## 2025-05-02 RX ORDER — MECLIZINE HYDROCHLORIDE 25 MG/1
25 TABLET ORAL ONCE
Status: COMPLETED | OUTPATIENT
Start: 2025-05-02 | End: 2025-05-02

## 2025-05-02 RX ORDER — IOPAMIDOL 755 MG/ML
100 INJECTION, SOLUTION INTRAVASCULAR
Status: COMPLETED | OUTPATIENT
Start: 2025-05-02 | End: 2025-05-02

## 2025-05-02 RX ORDER — DIAZEPAM 10 MG/2ML
5 INJECTION, SOLUTION INTRAMUSCULAR; INTRAVENOUS ONCE
Status: DISCONTINUED | OUTPATIENT
Start: 2025-05-02 | End: 2025-05-02 | Stop reason: HOSPADM

## 2025-05-02 RX ORDER — SODIUM CHLORIDE 0.9 % (FLUSH) 0.9 %
10 SYRINGE (ML) INJECTION AS NEEDED
Status: DISCONTINUED | OUTPATIENT
Start: 2025-05-02 | End: 2025-05-02 | Stop reason: HOSPADM

## 2025-05-02 RX ORDER — ONDANSETRON 4 MG/1
4 TABLET, FILM COATED ORAL EVERY 6 HOURS
Qty: 12 TABLET | Refills: 0 | Status: SHIPPED | OUTPATIENT
Start: 2025-05-02

## 2025-05-02 RX ORDER — MECLIZINE HYDROCHLORIDE 25 MG/1
25 TABLET ORAL 3 TIMES DAILY PRN
Qty: 15 TABLET | Refills: 0 | Status: SHIPPED | OUTPATIENT
Start: 2025-05-02

## 2025-05-02 RX ADMIN — IOPAMIDOL 85 ML: 755 INJECTION, SOLUTION INTRAVENOUS at 13:17

## 2025-05-02 RX ADMIN — MECLIZINE HYDROCHLORIDE 25 MG: 25 TABLET ORAL at 11:09

## 2025-05-02 RX ADMIN — ONDANSETRON 4 MG: 2 INJECTION INTRAMUSCULAR; INTRAVENOUS at 11:11

## 2025-05-02 RX ADMIN — SODIUM CHLORIDE 1000 ML: 900 INJECTION, SOLUTION INTRAVENOUS at 11:11

## 2025-05-02 NOTE — FSED PROVIDER NOTE
Subjective  History of Present Illness:    Patient is a 55-year-old male presenting to the emergency department with complaints of dizziness.  He states symptoms started suddenly an hour ago while working.  Patient reports feeling sweaty, nauseous at time of onset.  He describes a spinning sensation, worsened with positional changes and head movement.  Denies numbness, tingling, weakness, visual changes, headache, chest pain, shortness of breath.    Nurses Notes reviewed and agree, including vitals, allergies, social history and prior medical history.     REVIEW OF SYSTEMS: All systems reviewed and not pertinent unless noted.  Review of Systems   Neurological:  Positive for dizziness.   All other systems reviewed and are negative.      Past Medical History:   Diagnosis Date    Allergic     Anxiety always    Arthritis     Bell's palsy     2018    Colon polyp     Depression     Diabetes mellitus     GERD (gastroesophageal reflux disease)     Hyperlipidemia     Irritable bowel syndrome     Obesity     Pneumonia     Seasonal allergies     Substance abuse     Tremor     Visual impairment        Allergies:    Trazodone, Lactose intolerance (gi), and Wellbutrin [bupropion]      Past Surgical History:   Procedure Laterality Date    COLON RESECTION N/A 2023    Procedure: COLON RESECTION LAPAROSCOPIC RIGHT WITH DAVINCI ROBOT;  Surgeon: Rinku Urrutia MD;  [facility]  Service: Robotics - DaVinci;  Laterality: N/A;    COLONOSCOPY      CYST REMOVAL Right          Social History     Socioeconomic History    Marital status:    Tobacco Use    Smoking status: Former     Current packs/day: 0.00     Average packs/day: 1 pack/day for 27.0 years (27.0 ttl pk-yrs)     Types: Cigarettes     Start date: 6/15/1991     Quit date: 6/15/2018     Years since quittin.8     Passive exposure: Past    Smokeless tobacco: Never   Vaping Use    Vaping status: Never Used   Substance and Sexual Activity  "   Alcohol use: No    Drug use: Not Currently     Comment: alcohol abuse for years    Sexual activity: Not Currently     Partners: Female     Birth control/protection: Condom, Abstinence         Family History   Problem Relation Age of Onset    Depression Mother     Diabetes Father     Alcohol abuse Father     Arthritis Father     Birth defects Maternal Grandfather     Mental illness Maternal Grandfather     Stroke Maternal Grandmother     Vision loss Paternal Grandfather     Cancer Paternal Grandmother     Asthma Sister     Asthma Daughter        Objective  Physical Exam:  /92   Pulse 65   Temp 97.9 °F (36.6 °C) (Oral)   Resp 18   Ht 185.4 cm (73\")   Wt 118 kg (260 lb)   SpO2 99%   BMI 34.30 kg/m²      Physical Exam  Vitals and nursing note reviewed.   Constitutional:       Appearance: Normal appearance. He is normal weight.   HENT:      Head: Normocephalic and atraumatic.      Right Ear: Tympanic membrane normal.      Left Ear: Tympanic membrane normal.   Eyes:      Extraocular Movements: Extraocular movements intact.      Pupils: Pupils are equal, round, and reactive to light.   Cardiovascular:      Rate and Rhythm: Normal rate.   Pulmonary:      Effort: Pulmonary effort is normal.   Musculoskeletal:         General: Normal range of motion.      Cervical back: Normal range of motion.   Skin:     General: Skin is warm and dry.   Neurological:      General: No focal deficit present.      Mental Status: He is alert and oriented to person, place, and time. Mental status is at baseline.      Cranial Nerves: No cranial nerve deficit or dysarthria.      Sensory: No sensory deficit.      Motor: No weakness or pronator drift.      Coordination: Romberg sign negative.   Psychiatric:         Mood and Affect: Mood normal.         Behavior: Behavior normal.         Thought Content: Thought content normal.         Judgment: Judgment normal.         Procedures    ED Course:         Lab Results (last 24 hours)       " Procedure Component Value Units Date/Time    CBC & Differential [[id number]]  (Abnormal) Collected: 05/02/25 1041    Specimen: Blood Updated: 05/02/25 1051    Narrative:      The following orders were created for panel order CBC & Differential.  Procedure                               Abnormality         Status                     ---------                               -----------         ------                     CBC Auto Differential[[id number]]        Abnormal            Final result                 Please view results for these tests on the individual orders.    Comprehensive Metabolic Panel [[id number]]  (Abnormal) Collected: 05/02/25 1041    Specimen: Blood Updated: 05/02/25 1109     Glucose 110 mg/dL      BUN 12 mg/dL      Creatinine 1.21 mg/dL      Sodium 140 mmol/L      Potassium 3.8 mmol/L      Chloride 106 mmol/L      CO2 22.0 mmol/L      Calcium 9.5 mg/dL      Total Protein 6.9 g/dL      Albumin 4.1 g/dL      ALT (SGPT) 34 U/L      AST (SGOT) 32 U/L      Alkaline Phosphatase 80 U/L      Total Bilirubin 1.2 mg/dL      Globulin 2.8 gm/dL      A/G Ratio 1.5 g/dL      BUN/Creatinine Ratio 9.9     Anion Gap 12.0 mmol/L      eGFR 71.6 mL/min/1.73     Narrative:      GFR Categories in Chronic Kidney Disease (CKD)              GFR Category          GFR (mL/min/1.73)    Interpretation  G1                    90 or greater        Normal or high (1)  G2                    60-89                Mild decrease (1)  G3a                   45-59                Mild to moderate decrease  G3b                   30-44                Moderate to severe decrease  G4                    15-29                Severe decrease  G5                    14 or less           Kidney failure    (1)In the absence of evidence of kidney disease, neither GFR category G1 or G2 fulfill the criteria for CKD.    eGFR calculation 2021 CKD-EPI creatinine equation, which does not include race as a factor    High Sensitivity Troponin T [[id number]]   (Normal) Collected: 05/02/25 1041    Specimen: Blood Updated: 05/02/25 1106     HS Troponin T 7 ng/L     Magnesium [[id number]]  (Normal) Collected: 05/02/25 1041    Specimen: Blood Updated: 05/02/25 1109     Magnesium 2.2 mg/dL     CBC Auto Differential [[id number]]  (Abnormal) Collected: 05/02/25 1041    Specimen: Blood Updated: 05/02/25 1051     WBC 8.13 10*3/mm3      RBC 5.10 10*6/mm3      Hemoglobin 15.4 g/dL      Hematocrit 46.3 %      MCV 90.8 fL      MCH 30.2 pg      MCHC 33.3 g/dL      RDW 13.4 %      RDW-SD 45.3 fl      MPV 12.5 fL      Platelets 175 10*3/mm3      Neutrophil % 54.9 %      Lymphocyte % 36.3 %      Monocyte % 7.4 %      Eosinophil % 0.9 %      Basophil % 0.4 %      Immature Grans % 0.1 %      Neutrophils, Absolute 4.47 10*3/mm3      Lymphocytes, Absolute 2.95 10*3/mm3      Monocytes, Absolute 0.60 10*3/mm3      Eosinophils, Absolute 0.07 10*3/mm3      Basophils, Absolute 0.03 10*3/mm3      Immature Grans, Absolute 0.01 10*3/mm3     Urinalysis With Microscopic If Indicated (No Culture) - Urine, Clean Catch [[id number]]  (Abnormal) Collected: 05/02/25 1136    Specimen: Urine, Clean Catch Updated: 05/02/25 1207     Color, UA Yellow     Appearance, UA Clear     pH, UA 7.0     Specific Gravity, UA 1.015     Glucose, UA Negative     Ketones, UA 40 mg/dL (2+)     Bilirubin, UA Negative     Blood, UA Negative     Protein, UA Negative     Leuk Esterase, UA Negative     Nitrite, UA Negative     Urobilinogen, UA 0.2 E.U./dL    Narrative:      Urine microscopic not indicated.    High Sensitivity Troponin T 1Hr [[id number]] Collected: 05/02/25 1142    Specimen: Blood Updated: 05/02/25 1204     HS Troponin T <6 ng/L      Troponin T Numeric Delta --     Comment: Unable to calculate.       Narrative:      High Sensitive Troponin T Reference Range:  <14.0 ng/L- Negative Female for AMI  <22.0 ng/L- Negative Male for AMI  >=14 - Abnormal Female indicating possible myocardial injury.  >=22 - Abnormal Male  indicating possible myocardial injury.   Clinicians would have to utilize clinical acumen, EKG, Troponin, and serial changes to determine if it is an Acute Myocardial Infarction or myocardial injury due to an underlying chronic condition.                  CT Angiogram Head  Result Date: 5/2/2025  CT ANGIOGRAM NECK, CT ANGIOGRAM HEAD Date of Exam: 5/2/2025 12:31 PM EDT Indication: dizziness, ataxia. Comparison: None available. Technique: CTA of the head and neck was performed before and after the uneventful intravenous administration of 70 mL Isovue-370. Reconstructed coronal and sagittal images were also obtained. In addition, a 3-D volume rendered image was created for interpretation. Automated exposure control and iterative reconstruction methods were used. Findings: No abrupt cut off/large vessel occlusion, flow-limiting stenosis, dissection, or aneurysm in the head or neck. The cerebral veins and dural venous sinuses are grossly patent. Upper lungs are clear. The pharyngeal and laryngeal structures are unremarkable. No acute or suspicious bony findings.     Impression: Impression: No abrupt cut off/large vessel occlusion, flow-limiting stenosis, dissection, or aneurysm. Electronically Signed: Pal Boswell MD  5/2/2025 1:27 PM EDT  Workstation ID: [id number]    CT Angiogram Neck  Result Date: 5/2/2025  CT ANGIOGRAM NECK, CT ANGIOGRAM HEAD Date of Exam: 5/2/2025 12:31 PM EDT Indication: dizziness, ataxia. Comparison: None available. Technique: CTA of the head and neck was performed before and after the uneventful intravenous administration of 70 mL Isovue-370. Reconstructed coronal and sagittal images were also obtained. In addition, a 3-D volume rendered image was created for interpretation. Automated exposure control and iterative reconstruction methods were used. Findings: No abrupt cut off/large vessel occlusion, flow-limiting stenosis, dissection, or aneurysm in the head or neck. The cerebral veins and dural  venous sinuses are grossly patent. Upper lungs are clear. The pharyngeal and laryngeal structures are unremarkable. No acute or suspicious bony findings.     Impression: Impression: No abrupt cut off/large vessel occlusion, flow-limiting stenosis, dissection, or aneurysm. Electronically Signed: Pal Boswell MD  5/2/2025 1:27 PM EDT  Workstation ID: [id number]    CT Head Without Contrast  Result Date: 5/2/2025  CT HEAD WO CONTRAST Date of Exam: 5/2/2025 11:25 AM EDT Indication: dizziness. Comparison: 11/16/2021 Technique: Axial CT images were obtained of the head without contrast administration.  Automated exposure control and iterative construction methods were used. Findings: There is no evidence of acute territorial infarction. There is no acute intracranial hemorrhage. There are no extra-axial collections. Ventricles and CSF spaces are symmetric. No mass effect nor hydrocephalus. Brain parenchyma appears normal for age.  Paranasal sinuses and mastoid air cells are adequately aerated.  Osseous structures and orbits appear intact.     Impression: Impression: No acute intracranial findings. Electronically Signed: Kelvin Ching MD  5/2/2025 11:35 AM EDT  Workstation ID: [id number]    XR Chest 1 View  Result Date: 5/2/2025  XR CHEST 1 VW Date of Exam: 5/2/2025 11:17 AM EDT Indication: Weak/Dizzy/AMS triage protocol Comparison: CT chest 10/12/2024, 2 view chest x-ray 7/22/2019, AP chest x-ray 2/5/2019 Findings: Allowing for lower lung volumes, lungs appear clear. Cardiomediastinal contours appear within normal limits, allowing for portable AP technique.     Impression: Impression: Lower lung volumes without definite acute cardiopulmonary abnormality. Electronically Signed: Edel Segal MD  5/2/2025 11:23 AM EDT  Workstation ID: [id number]         [facility]     Amount and/or Complexity of Data Reviewed  Clinical lab tests: reviewed  Tests in the radiology section of CPT®: reviewed  Tests in the medicine section of CPT®:  reviewed          DDX: includes but is not limited to: Vertigo, CVA, idiopathic dizziness,    Patient arrives POV with vitals interpreted by myself.     Pertinent features from physical exam: No focal neurological deficits on exam.    Diagnostic information from other sources: Noncontrast CT, CTA of the head and neck are negative for acute findings, vascular occlusion.  Labs are nonactionable.    Interventions / Re-evaluation: Patient was given IV fluids, meclizine, Zofran with significant improvement of his symptoms.  He is able to stand, move his head without reproduction of symptoms    Medications   sodium chloride 0.9 % bolus 1,000 mL (0 mL Intravenous Stopped 5/2/25 1420)   ondansetron (ZOFRAN) injection 4 mg (4 mg Intravenous Given 5/2/25 1111)   meclizine (ANTIVERT) tablet 25 mg (25 mg Oral Given 5/2/25 1109)   iopamidol (ISOVUE-370) 76 % injection 100 mL (85 mL Intravenous Given 5/2/25 1317)       Results/clinical rationale were discussed with patient      Patient evaluated for sudden onset dizziness, nausea just prior to arrival.  Symptoms are reproducible with positional changes and movement of his head.  No focal neurological deficits on exam.  CT/CTA head and neck are negative for acute findings.  Labs are nonactionable.  He had significant improvement of his symptoms with treatment of positional vertigo.  Follow-up on an outpatient basis.  He is well-appearing with stable vitals at time of discharge    -----  ED Disposition       ED Disposition   Discharge    Condition   Stable    Comment   --             Final diagnoses:   Vertigo      Your Follow-Up Providers       Alice Mantilla DO.    Specialty: Family Medicine  Follow up details: recheck of symptoms  [facility] [address] [address] [phone]                       Contact information for after-discharge care    Follow-up information has not been specified.                    Your medication list        START taking these  medications        Instructions Last Dose Given Next Dose Due   meclizine 25 MG tablet  Commonly known as: ANTIVERT      Take 1 tablet by mouth 3 (Three) Times a Day As Needed for Dizziness.       ondansetron 4 MG tablet  Commonly known as: ZOFRAN      Take 1 tablet by mouth Every 6 (Six) Hours.              CONTINUE taking these medications        Instructions Last Dose Given Next Dose Due   Diclofenac Sodium 1 % gel gel  Commonly known as: VOLTAREN      Apply 4 g topically to the appropriate area as directed 2 (Two) Times a Day.       glucose blood test strip      To test glucose level as directed       multivitamin with minerals tablet tablet      Take 1 tablet by mouth Daily.       vitamin D 1.25 MG (32964 UT) capsule capsule  Commonly known as: ERGOCALCIFEROL      Take 1 capsule by mouth 1 (One) Time Per Week.                 Where to Get Your Medications        These medications were sent to [facility] [id number] - [place], [place] - [address] AT [facility] - [phone]  - [phone]   [address], [facility] [address]      Phone: [phone]   meclizine 25 MG tablet  ondansetron 4 MG tablet

## 2025-05-02 NOTE — Clinical Note
[facility] EMERGENCY DEPARTMENT [place]  [address]   [facility] [address]  Phone: [phone]  Fax: [phone]    Tank Tejeda was seen and treated in our emergency department on 5/2/2025.  He may return to work on 05/03/2025.         Thank you for choosing [facility].    Jude Baird PA-C

## 2025-05-07 ENCOUNTER — OFFICE VISIT (OUTPATIENT)
Dept: FAMILY MEDICINE CLINIC | Facility: CLINIC | Age: 55
End: 2025-05-07
Payer: COMMERCIAL

## 2025-05-07 VITALS
SYSTOLIC BLOOD PRESSURE: 156 MMHG | HEART RATE: 66 BPM | BODY MASS INDEX: 34.94 KG/M2 | DIASTOLIC BLOOD PRESSURE: 90 MMHG | TEMPERATURE: 97.7 F | HEIGHT: 73 IN | OXYGEN SATURATION: 100 % | WEIGHT: 263.6 LBS

## 2025-05-07 DIAGNOSIS — H66.90 ACUTE OTITIS MEDIA, UNSPECIFIED OTITIS MEDIA TYPE: ICD-10-CM

## 2025-05-07 DIAGNOSIS — Z79.4 TYPE 2 DIABETES MELLITUS WITHOUT COMPLICATION, WITH LONG-TERM CURRENT USE OF INSULIN: ICD-10-CM

## 2025-05-07 DIAGNOSIS — E11.9 TYPE 2 DIABETES MELLITUS WITHOUT COMPLICATION, WITH LONG-TERM CURRENT USE OF INSULIN: ICD-10-CM

## 2025-05-07 DIAGNOSIS — R42 VERTIGO: ICD-10-CM

## 2025-05-07 DIAGNOSIS — I10 HYPERTENSION, UNSPECIFIED TYPE: Primary | ICD-10-CM

## 2025-05-07 LAB
EXPIRATION DATE: ABNORMAL
HBA1C MFR BLD: 6.1 % (ref 4.5–5.7)
Lab: ABNORMAL

## 2025-05-07 RX ORDER — AZITHROMYCIN 250 MG/1
TABLET, FILM COATED ORAL
Qty: 6 TABLET | Refills: 0 | Status: SHIPPED | OUTPATIENT
Start: 2025-05-07

## 2025-05-07 RX ORDER — AMLODIPINE BESYLATE 5 MG/1
5 TABLET ORAL DAILY
Qty: 30 TABLET | Refills: 3 | Status: SHIPPED | OUTPATIENT
Start: 2025-05-07 | End: 2025-05-07

## 2025-05-07 NOTE — PROGRESS NOTES
Subjective   Tank Tejeda is a 55 y.o. male.     History of Present Illness He was seen in the ER on 5-25 [facility] for he had cardiac enzymes and EKG that was read as normal he had a normal CT scan of his head without contrast and then subsequently had a normal CT angio of head and neck.  I have reviewed all those results and the records available to review at this time.    He has gotten some better.  Room has not been spinning.  He went to work today and still feeling nausea.  Did not  meclizine or zofran.  He is trying to get to root cause as opposed to figure out root cause.  Tries to avoid medication. He did feel better but still feeling nausea with movement.      He would like me to check ears for infection.  He is aware of BPPV and he looked online for maneuvers.      He doesn't believe that his blood pressure is elevated.      He has read online that diabetes can contribute to vertigo.  He has not been eating carbs or sugar.      The following portions of the patient's history were reviewed and updated as appropriate: allergies, current medications, past family history, past medical history, past social history, past surgical history, and problem list.    Review of Systems   Constitutional:  Negative for chills, fatigue, fever and unexpected weight change.   HENT:  Negative for congestion, ear pain, nosebleeds, rhinorrhea, sinus pressure, sneezing, sore throat and trouble swallowing.    Eyes:  Negative for itching and visual disturbance.   Respiratory:  Negative for cough, chest tightness, shortness of breath and wheezing.    Cardiovascular:  Negative for chest pain, palpitations and leg swelling.   Gastrointestinal:  Positive for nausea. Negative for abdominal pain, anal bleeding, blood in stool, constipation, diarrhea and vomiting.   Endocrine: Negative for cold intolerance, heat intolerance, polydipsia and polyuria.   Genitourinary:  Negative for difficulty urinating, frequency,  "hematuria and urgency.   Musculoskeletal:  Negative for back pain, gait problem and myalgias.   Skin:  Negative for rash and wound.   Neurological:  Positive for dizziness. Negative for weakness, numbness and headaches.   Hematological:  Negative for adenopathy. Does not bruise/bleed easily.   Psychiatric/Behavioral:  Negative for agitation, confusion, decreased concentration and suicidal ideas. The patient is not nervous/anxious.        Objective     Vitals:    05/07/25 1057   BP: 156/90   Pulse: 66   Temp: 97.7 °F (36.5 °C)   TempSrc: Infrared   SpO2: 100%   Weight: 120 kg (263 lb 9.6 oz)   Height: 185.4 cm (72.99\")       Physical Exam  Vitals and nursing note reviewed.   Constitutional:       Appearance: He is well-developed.   HENT:      Head: Normocephalic and atraumatic.      Ears:      Comments: Right tm dull erythematous    Eyes:      General: No scleral icterus.        Right eye: No discharge.         Left eye: No discharge.      Conjunctiva/sclera: Conjunctivae normal.      Pupils: Pupils are equal, round, and reactive to light.   Neck:      Thyroid: No thyromegaly.      Vascular: No JVD.      Trachea: No tracheal deviation.   Cardiovascular:      Rate and Rhythm: Normal rate and regular rhythm.      Heart sounds: Normal heart sounds. No murmur heard.     No friction rub. No gallop.   Pulmonary:      Effort: Pulmonary effort is normal. No respiratory distress.      Breath sounds: Normal breath sounds. No wheezing or rales.   Chest:      Chest wall: No tenderness.   Abdominal:      General: Bowel sounds are normal. There is no distension.      Palpations: Abdomen is soft. There is no mass.      Tenderness: There is no abdominal tenderness.   Musculoskeletal:         General: Normal range of motion.      Cervical back: Normal range of motion and neck supple.   Skin:     General: Skin is warm and dry.   Neurological:      Mental Status: He is alert and oriented to person, place, and time.      Cranial Nerves: " No cranial nerve deficit.      Coordination: Coordination normal.      Deep Tendon Reflexes: Reflexes are normal and symmetric. Reflexes normal.   Psychiatric:         Behavior: Behavior normal.         Thought Content: Thought content normal.         Judgment: Judgment normal.         Assessment & Plan     Problem List Items Addressed This Visit          Cardiac and Vasculature    Hypertension - Primary       ENT    Vertigo    Acute otitis media    Relevant Medications    azithromycin (Zithromax Z-Wyatt) 250 MG tablet     Other Visit Diagnoses         Type 2 diabetes mellitus without complication, with long-term current use of insulin        Relevant Orders    POC Glycosylated Hemoglobin (Hb A1C) (Completed)          Needs work note and work release.    Declines blood pressure medication today.    Treat right otitis media with azithromycin.    Hemoglobin A1c looks good at 6.1 he is controlling this with diet and lifestyle changes.    Follow-up if symptoms or not improved discussed BPPV etiology and pathophysiology with the patient not often is there a quick fix or a easy diagnosis but the fact that his symptoms do seem to be movement associated and improved with certain I [place] lith redistribution attribution and movement gives us some ease about the diagnosis.  He also had a normal CT angio head and neck and CT scan of his head.

## 2025-05-09 LAB
QT INTERVAL: 386 MS
QT INTERVAL: 400 MS
QTC INTERVAL: 402 MS
QTC INTERVAL: 404 MS

## (undated) DEVICE — COVER,MAYO STAND,XL,STERILE: Brand: MEDLINE

## (undated) DEVICE — ADHS SKIN PREMIERPRO EXOFIN TOPICAL HI/VISC .5ML

## (undated) DEVICE — CANNULA SEAL

## (undated) DEVICE — SAFESECURE,SECUREMENT,FOLEY CATH,STERILE: Brand: MEDLINE

## (undated) DEVICE — SPNG LAP PREWSH SFTPK 18X18IN STRL PK/5

## (undated) DEVICE — INTENDED FOR TISSUE SEPARATION, AND OTHER PROCEDURES THAT REQUIRE A SHARP SURGICAL BLADE TO PUNCTURE OR CUT.: Brand: BARD-PARKER ® STAINLESS STEEL BLADES

## (undated) DEVICE — GLV SURG SENSICARE PI MIC PF SZ8 LF STRL

## (undated) DEVICE — TBG PENCL TELESCP MEGADYNE SMOKE EVAC 10FT

## (undated) DEVICE — ANTIBACTERIAL UNDYED BRAIDED (POLYGLACTIN 910), SYNTHETIC ABSORBABLE SUTURE: Brand: COATED VICRYL

## (undated) DEVICE — TROCAR: Brand: KII FIOS FIRST ENTRY

## (undated) DEVICE — VESSEL SEALER EXTEND: Brand: ENDOWRIST

## (undated) DEVICE — GOWN,PREVENTION PLUS,XXLARGE,STERILE: Brand: MEDLINE

## (undated) DEVICE — TIP COVER ACCESSORY

## (undated) DEVICE — ADHS LIQ MASTISOL 2/3ML

## (undated) DEVICE — LAPAROVUE VISIBILITY SYSTEM LAPAROSCOPIC SOLUTIONS: Brand: LAPAROVUE

## (undated) DEVICE — DRSNG TELFA PAD NONADH STR 1S 3X8IN

## (undated) DEVICE — PATIENT RETURN ELECTRODE, SINGLE-USE, CONTACT QUALITY MONITORING, ADULT, WITH 9FT CORD, FOR PATIENTS WEIGING OVER 33LBS. (15KG): Brand: MEGADYNE

## (undated) DEVICE — CLTH CLENS READYCLEANSE PERI CARE PK/5

## (undated) DEVICE — GLV SURG SENSICARE PI MIC PF SZ8.5 LF STRL

## (undated) DEVICE — COLUMN DRAPE

## (undated) DEVICE — BLANKT WARM UPPR/BDY ARM/OUT 57X196CM

## (undated) DEVICE — TOTAL TRAY, 16FR 10ML SIL FOLEY, URN: Brand: MEDLINE

## (undated) DEVICE — STAPLER 60: Brand: SUREFORM

## (undated) DEVICE — STRIP,CLOSURE,WOUND,MEDI-STRIP,1/2X4: Brand: MEDLINE

## (undated) DEVICE — APPL CHLORAPREP W/TINT 26ML BLU

## (undated) DEVICE — BNDR ABD PREMIUM/UNIV 10IN 27TO48IN

## (undated) DEVICE — ARM DRAPE

## (undated) DEVICE — PK UROL DAVINCI 10

## (undated) DEVICE — REDUCER: Brand: ENDOWRIST

## (undated) DEVICE — SUT PDS 1 CTX 36IN VIO PDP371T

## (undated) DEVICE — WOUND RETRACTOR AND PROTECTOR: Brand: ALEXIS WOUND PROTECTOR-RETRACTOR

## (undated) DEVICE — SUT MNCRYL PLS ANTIB UD 4/0 PS2 18IN

## (undated) DEVICE — ST TBG CONN PNEUMOCLEAR EVAC SMOKE HEAT/HUMID

## (undated) DEVICE — SEAL